# Patient Record
Sex: MALE | Race: WHITE | NOT HISPANIC OR LATINO | URBAN - METROPOLITAN AREA
[De-identification: names, ages, dates, MRNs, and addresses within clinical notes are randomized per-mention and may not be internally consistent; named-entity substitution may affect disease eponyms.]

---

## 2021-09-19 ENCOUNTER — OUT OF OFFICE VISIT (OUTPATIENT)
Dept: URBAN - METROPOLITAN AREA MEDICAL CENTER 18 | Facility: MEDICAL CENTER | Age: 51
End: 2021-09-19
Payer: COMMERCIAL

## 2021-09-19 DIAGNOSIS — D69.6 ACQUIRED THROMBOCYTOPENIA: ICD-10-CM

## 2021-09-19 DIAGNOSIS — K75.81 NASH (NONALCOHOLIC STEATOHEPATITIS): ICD-10-CM

## 2021-09-19 PROCEDURE — 99254 IP/OBS CNSLTJ NEW/EST MOD 60: CPT | Performed by: INTERNAL MEDICINE

## 2021-09-20 ENCOUNTER — OUT OF OFFICE VISIT (OUTPATIENT)
Dept: URBAN - METROPOLITAN AREA MEDICAL CENTER 18 | Facility: MEDICAL CENTER | Age: 51
End: 2021-09-20
Payer: COMMERCIAL

## 2021-09-20 DIAGNOSIS — K74.69 CIRRHOSIS, CRYPTOGENIC: ICD-10-CM

## 2021-09-20 PROCEDURE — 99232 SBSQ HOSP IP/OBS MODERATE 35: CPT | Performed by: INTERNAL MEDICINE

## 2021-10-18 ENCOUNTER — OFFICE VISIT (OUTPATIENT)
Dept: URBAN - METROPOLITAN AREA CLINIC 92 | Facility: CLINIC | Age: 51
End: 2021-10-18

## 2021-10-18 ENCOUNTER — OFFICE VISIT (OUTPATIENT)
Dept: URBAN - METROPOLITAN AREA CLINIC 86 | Facility: CLINIC | Age: 51
End: 2021-10-18
Payer: COMMERCIAL

## 2021-10-18 VITALS
BODY MASS INDEX: 29.12 KG/M2 | DIASTOLIC BLOOD PRESSURE: 86 MMHG | TEMPERATURE: 96.6 F | HEIGHT: 72 IN | WEIGHT: 215 LBS | HEART RATE: 97 BPM | SYSTOLIC BLOOD PRESSURE: 166 MMHG

## 2021-10-18 DIAGNOSIS — M86.9 OSTEOMYELITIS OF RIGHT FOOT, UNSPECIFIED TYPE: ICD-10-CM

## 2021-10-18 DIAGNOSIS — R76.0 RAISED ANTIBODY TITER: ICD-10-CM

## 2021-10-18 DIAGNOSIS — E11.69 TYPE 2 DIABETES MELLITUS WITH OTHER SPECIFIED COMPLICATION, WITHOUT LONG-TERM CURRENT USE OF INSULIN: ICD-10-CM

## 2021-10-18 DIAGNOSIS — Z79.899 HIGH RISK MEDICATION USE: ICD-10-CM

## 2021-10-18 DIAGNOSIS — Z71.89 VACCINE COUNSELING: ICD-10-CM

## 2021-10-18 DIAGNOSIS — N17.9 ACUTE KIDNEY INJURY: ICD-10-CM

## 2021-10-18 DIAGNOSIS — K74.69 OTHER CIRRHOSIS OF LIVER: ICD-10-CM

## 2021-10-18 DIAGNOSIS — B18.1 CARRIER OF HEPATITIS B: ICD-10-CM

## 2021-10-18 DIAGNOSIS — R18.8 ASCITES OF LIVER: ICD-10-CM

## 2021-10-18 DIAGNOSIS — E80.4 GILBERTS SYNDROME: ICD-10-CM

## 2021-10-18 DIAGNOSIS — K75.81 NASH (NONALCOHOLIC STEATOHEPATITIS): ICD-10-CM

## 2021-10-18 DIAGNOSIS — R74.8 ABNORMAL LIVER ENZYMES: ICD-10-CM

## 2021-10-18 PROCEDURE — 99204 OFFICE O/P NEW MOD 45 MIN: CPT

## 2021-10-18 RX ORDER — GLIMEPIRIDE 4 MG/1
1.5 TABLET WITH BREAKFAST OR THE FIRST MAIN MEAL OF THE DAY TABLET ORAL ONCE A DAY
Status: ACTIVE | COMMUNITY

## 2021-10-18 RX ORDER — TENOFOVIR ALAFENAMIDE 25 MG/1
1 TABLET WITH FOOD TABLET ORAL ONCE A DAY
Qty: 30 | Refills: 2 | OUTPATIENT
Start: 2021-10-18 | End: 2022-01-15

## 2021-10-18 RX ORDER — FUROSEMIDE 40 MG/1
1 TABLET TABLET ORAL ONCE A DAY
Status: ACTIVE | COMMUNITY

## 2021-10-18 RX ORDER — SITAGLIPTIN AND METFORMIN HYDROCHLORIDE 50; 1000 MG/1; MG/1
2 TABLETS WITH EVENING MEAL TABLET, FILM COATED, EXTENDED RELEASE ORAL ONCE A DAY
Status: ACTIVE | COMMUNITY

## 2021-10-18 RX ORDER — POTASSIUM CHLORIDE 1500 MG/1
1.5 TABLET WITH FOOD TABLET, FILM COATED, EXTENDED RELEASE ORAL ONCE A DAY
Status: ACTIVE | COMMUNITY

## 2021-10-18 NOTE — HPI-TODAY'S VISIT:
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 Gregory.  A copy of this note will be sent to the referring provider.   He has a PMHx of DM2 (last a1c 7.5% on glimepiride and janumet), hx of right great toe infection 8/2021, and presented to the ED on 9/17 with erythema/pain/drainage from right 4th toe. He was recently diagnosed with OM of right 4th digit on 9/10 and is s/p 4th digit partial metatarsal resection by Dr. Mathew. Patient did not follow up with ID outpatient for antibiotics following 9/10 surgery. Patient started on Vanc/Zosyn in ED on 9/17  He underwent further surgery by Dr. Mathew on 09/19/2021 including right partial 4th metatarsal resection with wound debridement and application of wound VAC, right excisional debridement of the hallux plantar ulcer down to subcutaneous tissues on 9/19.   While admitted  he was diagnosed with hepatitis B, ultrasound of the liver was concerning for possible liver cirrhosis and ascites and recommended to follow up outpatient with GI.   Also noted to have ANGELINA that was improvingDischarged on 9/25 with IV Zosyn x6wks at outpatient infusion center (Dr. Behari) with wound vac f/u at Dr Mathew's clinic (podiatry).    Pt notes hx of PARDO/fatty liver disease x 10-15 yrs and treated with diet and exercise and monitoring of labs q6m with pcp. Has not seen hepatology for it.  Notes no recent issue with abd pain, bleeding or confusion. Was noted to have jaundice during admission with T bili 4.0, direct bili 0.7, indirect bili 3.3. Denies heavy etoh use. No hx of iv drug use. Previously just had labs checked with his pcp every 6 months.  No prior egd. Colon 2-3 yrs ago with no polyps  He has had chronic thrombocytopenia since 2017 (platelets in 130's) and as low as 76 on 7/29/21.  Platelets 142 on admission (9/17) and 101 (9/24)  He continues with IV Zosyn until 10/31/21.  He has been on furosemide 40 mg/day prescribed by Dr. Preya Behari since July and prev prescribed by Dr. Castaneda PCP for LE edema.  Ascites first noted during sept hospitalization.  Currently has abdominal soreness- intially thought secondary to heparin injections.  No paracentesis performed while admitted, although US showed moderate ascites.   Abdominal distention/weight stable since discharge.  No fevers/chills.  No signs of GI bleeding/confusion. Does admit to decreased energy.  He believes he contracted HBV from his now ex-wife who notified him that she had been exposed to HBV after marital infidelity.  He  in July 2021.  Believes last exposure about 6 months ago.    Labs 9/25/2021: WBC 6.7, hemoglobin 9.0, MCV 96.7, platelets low 92, sodium 137, potassium 3.3 low, creatinine 1.54 high, calcium 7.9 low.  Labs 9/23/2021: HIV negative  Labs 9/22/2021: Hepatitis B viral  million, hepatitis B E antibody nonreactive  Labs 9/21/2021: Sodium 137 glucose 162, albumin low at 2.3, T bili 2.3 high, AST 69 high, ALT 31, alk phos 69, platelets 109 low, hemoglobin 9.7 low  Labs 9/20/2021: INR 1.39, PT 16.4, ASMA positive 1:40, hep B surface antigen positive, creatinine 1.65, alpha-1 antitrypsin 168, hepatitis A antibody IgM nonreactive, hep B core antibody IgM nonreactive, hep C antibody nonreactive, ceruloplasmin 32, AMA negative, AFP 1.1, vitamin D 11 low  Labs 9/19/2021: T bili 3.2, AST 63, ALT 32, alk phos 70, creatinine 1.75, total protein 6.0, albumin 2.3, sodium 136  Labs 9/18/2021: T bili 4.0, direct bili 0.7, indirect bili 3.3, A1c 7.5, COVID-19 negative   (9/18) GB US: FINDINGS: The pancreas is poorly visualized due to technical factors and overlying bowel gas. Liver and gallbladder are not well assessed due to poor acoustic windows. Increased liver echogenicity is suggested. Gallbladder is nonvisualized. There is a negative sonographic Cameron sign. The common bile duct measures 4 mm . The right kidney measures 12.7 cm in greatest longitudinal dimension. No right-sided hydronephrosis. There is a small to moderate amount of ascites demonstrated within the upper abdomen. Liver demonstrates a slightly nodular contour.   IMPRESSION: Summation limited by poor acoustic windows. The gallbladder was not visualized. Liver was not well assessed. Slightly nodular contour the liver suggests which may reflect underlying cirrhosis. Correlate with clinical findings. Diffuse mild increased liver echogenicity which can be seen with diffuse hepatic steatosis versus diffuse infiltration. Correlate with hepatic enzymes. Ascites.

## 2021-10-18 NOTE — EXAM-PHYSICAL EXAM
General: pleasant, well developed, well nourished, no acute distress, non ill appearing Eyes- no scleral icterus HENT: normocephalic, atraumatic head, face mask covering mouth and nose Neck: supple, no JVD Chest: normal breath sounds, normal work of breathing Heart: regular rate and rhythm without murmur Abdomen: tense abdomen with ascites, distended, non tender, bowel sounds present, no hepatomegaly, no splenomegaly,  Musculoskeletal: right foot with bandage intact, presented to clinic in wheelchair. Extremities: 2+ bilateral LE edema, no asterixis, + palmar erythema, PICC right arm with continuous abx infusion Skin: no rashes, no jaundice Neurologic: no focal deficits, alert and oriented x 3 Psychiatric: stable mood, appropriate affect

## 2021-10-26 LAB
A/G RATIO: 0.7
ACTIN (SMOOTH MUSCLE) ANTIBODY: 11
ALBUMIN: 3
ALKALINE PHOSPHATASE: 81
ALPHA-1-ANTITRYPSIN, SERUM: 232
ALT (SGPT): 20
ANA DIRECT: NEGATIVE
AST (SGOT): 45
BASO (ABSOLUTE): 0.1
BASOS: 1
BILIRUBIN, TOTAL: 3.3
BUN/CREATININE RATIO: 5
BUN: 6
CALCIUM: 7.8
CARBON DIOXIDE, TOTAL: 24
CHLORIDE: 97
CREATININE: 1.12
EGFR IF AFRICN AM: 89
EGFR IF NONAFRICN AM: 77
EOS (ABSOLUTE): 0.2
EOS: 3
FERRITIN, SERUM: 403
GLOBULIN, TOTAL: 4.1
GLUCOSE: 249
HBSAG SCREEN: POSITIVE
HBV LOG10: (no result)
HEMATOCRIT: 31.7
HEMATOLOGY COMMENTS:: (no result)
HEMOGLOBIN: 10.8
HEP A AB, TOTAL: NEGATIVE
HEP B CORE AB, TOT: POSITIVE
HEP BE AB: NEGATIVE
HEP BE AG: POSITIVE
HEPATITIS B QUANTITATION: (no result)
HEPATITIS B SURF AB QUANT: <3.1
IMMATURE CELLS: (no result)
IMMATURE GRANS (ABS): 0
IMMATURE GRANULOCYTES: 0
IMMUNOGLOBULIN G, QN, SERUM: 2396
IMMUNOGLOBULIN M, QN, SERUM: 141
INR: 1.2
IRON BIND.CAP.(TIBC): 212
IRON SATURATION: 33
IRON: 71
LYMPHS (ABSOLUTE): 1.2
LYMPHS: 18
MCH: 33.8
MCHC: 34.1
MCV: 99
MITOCHONDRIAL (M2) ANTIBODY: <20
MONOCYTES(ABSOLUTE): 0.6
MONOCYTES: 9
NEUTROPHILS (ABSOLUTE): 4.6
NEUTROPHILS: 69
NRBC: (no result)
PHENOTYPE (PI): (no result)
PLATELETS: 142
POTASSIUM: 3.1
PROTEIN, TOTAL: 7.1
PROTHROMBIN TIME: 12.7
RBC: 3.2
RDW: 14.5
SODIUM: 136
TEST INFORMATION:: (no result)
UIBC: 141
WBC: 6.6

## 2021-10-28 ENCOUNTER — TELEPHONE ENCOUNTER (OUTPATIENT)
Dept: URBAN - METROPOLITAN AREA CLINIC 92 | Facility: CLINIC | Age: 51
End: 2021-10-28

## 2021-10-28 NOTE — HPI-TODAY'S VISIT:
labs 10/18/21- TIBC slightly low at 212, iron sat normal at 33, igg 2396 elevated, igm 141, ferritin 403 elevated, hep be ag positive, hep be ab negative, asma negative (prev positive), ama negative, alpha 1 232 elevated but normal phenotype MM, glucose 249 elevated (please share with primary MD), creatinine 1.12 (prev 1.54), potassium low at 3.1 (please share with primary MD as likely secondary to lasix and they may want to adjust potassium supplment dosage or consider adding spironolactone to help with low potassium and fluid retention), albumin low at 3.0 but improved from prior of 2.3, total bili 3.3 elevated, alk phos 81 (prev 69), ast 45 elevated, alt 20, alyssa negative, wbc 6.6, hgb 10.8 low but up from prev 9.0, mcv high at 99, mch 33.8 elevated, rbc 3.2 (please share with primary md), plt 142 low (increased from 92 on 9/24/21), inr 1.2, hep b core ab positive, hep a ab total negative (recommend getting hep a vaccine as not immune), hep b surf ag positive.   I tried calling regarding lab results but it would not let me leave a voicemail.    Because kidney function has improved- we'd like to get MRI for further eval of liver.  An order will be sent to [place] and you can get that scheduled at [phone].  If you have any questions regarding above, please contact us at [phone] ext [phone].

## 2021-11-01 ENCOUNTER — LAB OUTSIDE AN ENCOUNTER (OUTPATIENT)
Dept: URBAN - METROPOLITAN AREA CLINIC 86 | Facility: CLINIC | Age: 51
End: 2021-11-01

## 2021-11-15 ENCOUNTER — TELEPHONE ENCOUNTER (OUTPATIENT)
Dept: URBAN - METROPOLITAN AREA CLINIC 92 | Facility: CLINIC | Age: 51
End: 2021-11-15

## 2021-11-15 ENCOUNTER — LAB OUTSIDE AN ENCOUNTER (OUTPATIENT)
Dept: URBAN - METROPOLITAN AREA CLINIC 86 | Facility: CLINIC | Age: 51
End: 2021-11-15

## 2021-11-15 ENCOUNTER — OFFICE VISIT (OUTPATIENT)
Dept: URBAN - METROPOLITAN AREA CLINIC 92 | Facility: CLINIC | Age: 51
End: 2021-11-15
Payer: COMMERCIAL

## 2021-11-15 DIAGNOSIS — K74.69 OTHER CIRRHOSIS OF LIVER: ICD-10-CM

## 2021-11-15 DIAGNOSIS — R18.8 ASCITES OF LIVER: ICD-10-CM

## 2021-11-15 DIAGNOSIS — B19.10 HEPATITIS B INFECTION WITHOUT DELTA AGENT WITHOUT HEPATIC COMA, UNSPECIFIED CHRONICITY: ICD-10-CM

## 2021-11-15 PROCEDURE — 99213 OFFICE O/P EST LOW 20 MIN: CPT | Performed by: INTERNAL MEDICINE

## 2021-11-15 RX ORDER — FUROSEMIDE 40 MG/1
1 TABLET TABLET ORAL ONCE A DAY
Status: ACTIVE | COMMUNITY

## 2021-11-15 RX ORDER — GLIMEPIRIDE 4 MG/1
1.5 TABLET WITH BREAKFAST OR THE FIRST MAIN MEAL OF THE DAY TABLET ORAL ONCE A DAY
Status: ACTIVE | COMMUNITY

## 2021-11-15 RX ORDER — SITAGLIPTIN AND METFORMIN HYDROCHLORIDE 50; 1000 MG/1; MG/1
2 TABLETS WITH EVENING MEAL TABLET, FILM COATED, EXTENDED RELEASE ORAL ONCE A DAY
Status: ACTIVE | COMMUNITY

## 2021-11-15 RX ORDER — POTASSIUM CHLORIDE 1500 MG/1
1.5 TABLET WITH FOOD TABLET, FILM COATED, EXTENDED RELEASE ORAL ONCE A DAY
Status: ACTIVE | COMMUNITY

## 2021-11-15 RX ORDER — TENOFOVIR ALAFENAMIDE 25 MG/1
1 TABLET WITH FOOD TABLET ORAL ONCE A DAY
Qty: 30 | Refills: 2 | Status: ACTIVE | COMMUNITY
Start: 2021-10-18 | End: 2022-01-15

## 2021-11-15 NOTE — PHYSICAL EXAM GASTROINTESTINAL
Abdomen , soft, nontender, severely distended , no guarding or rigidity , no masses palpable , normal bowel sounds , Liver and Spleen , no hepatomegaly present , no hepatosplenomegaly , liver nontender , spleen not palpable

## 2021-11-15 NOTE — HPI-TODAY'S VISIT:
Pt seen for HBV cirrhosis. Followed by Dr. Nolasco. Decompenasted with ascites. No previous EGD for variceal screening. No hx of GI Bleeding. Colonoscopy 2 yrs ago with polyp. Repeat recd in 5 yrs.

## 2021-11-15 NOTE — HPI-TODAY'S VISIT:
Received message from Dr. Estes recommending repeat para given tense ascites (seen by him today for EGD consultation).  Left message for patient advising we would send order to [place] today.  Encouraged to get updated labs and to call us if any questions/concerns.  Next f/u appt 11/29/21.

## 2021-11-29 ENCOUNTER — LAB OUTSIDE AN ENCOUNTER (OUTPATIENT)
Dept: URBAN - METROPOLITAN AREA CLINIC 13 | Facility: CLINIC | Age: 51
End: 2021-11-29

## 2021-11-29 ENCOUNTER — OFFICE VISIT (OUTPATIENT)
Dept: URBAN - METROPOLITAN AREA CLINIC 86 | Facility: CLINIC | Age: 51
End: 2021-11-29
Payer: COMMERCIAL

## 2021-11-29 VITALS
SYSTOLIC BLOOD PRESSURE: 142 MMHG | WEIGHT: 274 LBS | HEART RATE: 107 BPM | BODY MASS INDEX: 37.11 KG/M2 | TEMPERATURE: 98 F | HEIGHT: 72 IN | DIASTOLIC BLOOD PRESSURE: 82 MMHG

## 2021-11-29 DIAGNOSIS — R18.8 ASCITES OF LIVER: ICD-10-CM

## 2021-11-29 DIAGNOSIS — K74.69 OTHER CIRRHOSIS OF LIVER: ICD-10-CM

## 2021-11-29 DIAGNOSIS — B19.10 HEPATITIS B INFECTION WITHOUT DELTA AGENT WITHOUT HEPATIC COMA, UNSPECIFIED CHRONICITY: ICD-10-CM

## 2021-11-29 DIAGNOSIS — K75.81 NASH (NONALCOHOLIC STEATOHEPATITIS): ICD-10-CM

## 2021-11-29 PROCEDURE — 99214 OFFICE O/P EST MOD 30 MIN: CPT

## 2021-11-29 RX ORDER — SITAGLIPTIN AND METFORMIN HYDROCHLORIDE 50; 1000 MG/1; MG/1
2 TABLETS WITH EVENING MEAL TABLET, FILM COATED, EXTENDED RELEASE ORAL ONCE A DAY
Status: ACTIVE | COMMUNITY

## 2021-11-29 RX ORDER — TENOFOVIR ALAFENAMIDE 25 MG/1
1 TABLET WITH FOOD TABLET ORAL ONCE A DAY
Qty: 30 | Refills: 2 | OUTPATIENT

## 2021-11-29 RX ORDER — GLIMEPIRIDE 4 MG/1
1.5 TABLET WITH BREAKFAST OR THE FIRST MAIN MEAL OF THE DAY TABLET ORAL ONCE A DAY
Status: ACTIVE | COMMUNITY

## 2021-11-29 RX ORDER — POTASSIUM CHLORIDE 1500 MG/1
1.5 TABLET WITH FOOD TABLET, FILM COATED, EXTENDED RELEASE ORAL ONCE A DAY
Status: ACTIVE | COMMUNITY

## 2021-11-29 RX ORDER — FUROSEMIDE 40 MG/1
1 TABLET TABLET ORAL TWICE A DAY
Status: ACTIVE | COMMUNITY

## 2021-11-29 RX ORDER — TENOFOVIR ALAFENAMIDE 25 MG/1
1 TABLET WITH FOOD TABLET ORAL ONCE A DAY
Qty: 30 | Refills: 2 | Status: ACTIVE | COMMUNITY
Start: 2021-10-18 | End: 2022-01-15

## 2021-11-29 NOTE — EXAM-PHYSICAL EXAM
General: pleasant, well developed, well nourished, no acute distress, non ill appearing Eyes- no scleral icterus HENT: normocephalic, atraumatic head, face mask covering mouth and nose Neck: supple, no JVD Chest: normal breath sounds, normal work of breathing Heart: regular rate and rhythm without murmur Abdomen: tense abdomen with ascites, distended, non tender, bowel sounds present, no hepatomegaly, no splenomegaly,  Musculoskeletal: right foot with bandage intact and boot on.  Extremities: 2+ bilateral LE edema, no asterixis, + palmar erythema,  Skin: no rashes, no jaundice Neurologic: no focal deficits, alert and oriented x 3 Psychiatric: stable mood, appropriate affect

## 2021-11-29 NOTE — HPI-TODAY'S VISIT:
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 Gregory.  A copy of this note will be sent to the referring provider.  Pt returns for follow up of hep b cirrhosis with ascites.   S/p para with 5L ascites removed on 11/1/21, states more fluid remained but told they could only remove 5L max.  Currently scheduled for repeat para on 12/1/21 and MRI on 12/10, and EGD 12/6 with Dr. Estes.   Continues on Vemlidy, started about 2 weeks ago, no missed doses.  Weight 215 (estimate) on 10/18/21, 240 (estimate) on 11/15/21, and currently 274 today.   Admits to abdominal distention/weight increased.  No fevers/chills/abdominal pain. Had an episode of dark stools several weeks ago that lasted for 4 days, resolved but will f/u with Dr. Estes regarding this.  Colonoscopy about 2-3 years ago.  Was seen by pcp this AM, Dr. Castaneda.  He has been on furosemide 40 mg QD, and increased to furosemide 40 mg BID today, prescribed by Dr. Castaneda PCP for LE edema, on potassium supplement per pcp.   IV abx completed end of Oct 2021 for osteomyelitis, cleared by ID but will be on disability for 2 more months per podiatry.   Needs to work on low salt diet.  Last a1c 6.5% 11/29/21.     labs 10/18/21- TIBC slightly low at 212, iron sat normal at 33, igg 2396 elevated, igm 141, ferritin 403 elevated, hep be ag positive, hep be ab negative, asma negative (prev positive), ama negative, alpha 1 232 elevated but normal phenotype MM, glucose 249 elevated (please share with primary MD), creatinine 1.12 (prev 1.54), potassium low at 3.1 (please share with primary MD as likely secondary to lasix and they may want to adjust potassium supplment dosage or consider adding spironolactone to help with low potassium and fluid retention), albumin low at 3.0 but improved from prior of 2.3, total bili 3.3 elevated, alk phos 81 (prev 69), ast 45 elevated, alt 20, alyssa negative, wbc 6.6, hgb 10.8 low but up from prev 9.0, mcv high at 99, mch 33.8 elevated, rbc 3.2 (please share with primary md), plt 142 low (increased from 92 on 9/24/21), inr 1.2, hep b core ab positive, hep a ab total negative (recommend getting hep a vaccine as not immune), hep b surf ag positive.   RECAP: He has a PMHx of DM2 (last a1c 7.5% on glimepiride and janumet), hx of right great toe infection 8/2021, and presented to the ED on 9/17/21 with erythema/pain/drainage from right 4th toe. He was recently diagnosed with OM of right 4th digit on 9/10 and is s/p 4th digit partial metatarsal resection by Dr. Mathew. Patient did not follow up with ID outpatient for antibiotics following 9/10 surgery. Patient started on Vanc/Zosyn in ED on 9/17  He underwent further surgery by Dr. Mathew on 09/19/2021 including right partial 4th metatarsal resection with wound debridement and application of wound VAC, right excisional debridement of the hallux plantar ulcer down to subcutaneous tissues on 9/19.   While admitted  he was diagnosed with hepatitis B, ultrasound of the liver was concerning for possible liver cirrhosis and ascites and recommended to follow up outpatient with GI.   Also noted to have ANGELINA that was improving.  Discharged on 9/25 with IV Zosyn x6wks at outpatient infusion center (Dr. Behari) with wound vac f/u at Dr Mathew's clinic (podiatry).    Pt notes hx of PARDO/fatty liver disease x 10-15 yrs and treated with diet and exercise and monitoring of labs q6m with pcp. Has not seen hepatology for it.  Was noted to have jaundice during admission with T bili 4.0, direct bili 0.7, indirect bili 3.3. Denies heavy etoh use. No hx of iv drug use. Previously just had labs checked with his pcp every 6 months.  No prior egd. Colon 2-3 yrs ago with no polyps  He has had chronic thrombocytopenia since 2017 (platelets in 130's) and as low as 76 on 7/29/21.  Platelets 142 on admission (9/17) and 101 (9/24)  Ascites first noted during sept hospitalization.  Currently has abdominal soreness- intially thought secondary to heparin injections.  No paracentesis performed while admitted, although US showed moderate ascites.     He believes he contracted HBV from his now ex-wife who notified him that she had been exposed to HBV after marital infidelity.  He  in July 2021.  Believes last exposure about April 2021.  Labs 9/25/2021: WBC 6.7, hemoglobin 9.0, MCV 96.7, platelets low 92, sodium 137, potassium 3.3 low, creatinine 1.54 high, calcium 7.9 low.  Labs 9/23/2021: HIV negative  Labs 9/22/2021: Hepatitis B viral  million, hepatitis B E antibody nonreactive  Labs 9/21/2021: Sodium 137 glucose 162, albumin low at 2.3, T bili 2.3 high, AST 69 high, ALT 31, alk phos 69, platelets 109 low, hemoglobin 9.7 low  Labs 9/20/2021: INR 1.39, PT 16.4, ASMA positive 1:40, hep B surface antigen positive, creatinine 1.65, alpha-1 antitrypsin 168, hepatitis A antibody IgM nonreactive, hep B core antibody IgM nonreactive, hep C antibody nonreactive, ceruloplasmin 32, AMA negative, AFP 1.1, vitamin D 11 low  Labs 9/19/2021: T bili 3.2, AST 63, ALT 32, alk phos 70, creatinine 1.75, total protein 6.0, albumin 2.3, sodium 136  Labs 9/18/2021: T bili 4.0, direct bili 0.7, indirect bili 3.3, A1c 7.5, COVID-19 negative   (9/18) GB US: FINDINGS: The pancreas is poorly visualized due to technical factors and overlying bowel gas. Liver and gallbladder are not well assessed due to poor acoustic windows. Increased liver echogenicity is suggested. Gallbladder is nonvisualized. There is a negative sonographic Cameron sign. The common bile duct measures 4 mm . The right kidney measures 12.7 cm in greatest longitudinal dimension. No right-sided hydronephrosis. There is a small to moderate amount of ascites demonstrated within the upper abdomen. Liver demonstrates a slightly nodular contour.   IMPRESSION: Summation limited by poor acoustic windows. The gallbladder was not visualized. Liver was not well assessed. Slightly nodular contour the liver suggests which may reflect underlying cirrhosis. Correlate with clinical findings. Diffuse mild increased liver echogenicity which can be seen with diffuse hepatic steatosis versus diffuse infiltration. Correlate with hepatic enzymes. Ascites.

## 2021-12-05 LAB
A/G RATIO: 0.5
ALBUMIN: 2.3
ALKALINE PHOSPHATASE: 148
ALT (SGPT): 26
AST (SGOT): 57
BASO (ABSOLUTE): 0.1
BASOS: 1
BILIRUBIN, TOTAL: 2.7
BUN/CREATININE RATIO: 7
BUN: 7
CALCIUM: 7.9
CARBON DIOXIDE, TOTAL: 26
CHLORIDE: 93
CREATININE: 0.94
EGFR IF AFRICN AM: 110
EGFR IF NONAFRICN AM: 95
EOS (ABSOLUTE): 0.2
EOS: 2
FOLATE (FOLIC ACID), SERUM: 3.7
GLOBULIN, TOTAL: 4.7
GLUCOSE: 290
HBV LOG10: (no result)
HEMATOCRIT: 29.2
HEMATOLOGY COMMENTS:: (no result)
HEMOGLOBIN: 9.9
HEPATITIS B QUANTITATION: (no result)
IMMATURE CELLS: (no result)
IMMATURE GRANS (ABS): 0
IMMATURE GRANULOCYTES: 0
LYMPHS (ABSOLUTE): 1.4
LYMPHS: 19
Lab: (no result)
Lab: (no result)
MCH: 33
MCHC: 33.9
MCV: 97
MONOCYTES(ABSOLUTE): 1.1
MONOCYTES: 15
NEUTROPHILS (ABSOLUTE): 4.6
NEUTROPHILS: 63
NRBC: (no result)
PLATELETS: 146
POTASSIUM: 4
PROTEIN, TOTAL: 7
RBC: 3
RDW: 13.1
SODIUM: 131
TEST INFORMATION:: (no result)
VITAMIN B12: 1099
WBC: 7.4
WRITTEN AUTHORIZATION: (no result)

## 2021-12-06 ENCOUNTER — OFFICE VISIT (OUTPATIENT)
Dept: URBAN - METROPOLITAN AREA MEDICAL CENTER 12 | Facility: MEDICAL CENTER | Age: 51
End: 2021-12-06
Payer: COMMERCIAL

## 2021-12-06 PROCEDURE — 992 APS NON BILLABLE: Performed by: INTERNAL MEDICINE

## 2021-12-09 ENCOUNTER — TELEPHONE ENCOUNTER (OUTPATIENT)
Dept: URBAN - METROPOLITAN AREA CLINIC 92 | Facility: CLINIC | Age: 51
End: 2021-12-09

## 2021-12-09 NOTE — HPI-TODAY'S VISIT:
11/29/21 labs- vitamin b12 1099 normal , folate 3.7 normal, glucose 290 (significant elevated, please share with primary MD), creatinine 0.94 normal, monocytes absolute 1.1 high, sodium 131 low, chloride 93 low, calcium 7.9 low, albumin 2.3 (prev 2.8) low, total bili 2.7 high (prev 3.5 on 11/5/21), alk phos 148 elevated (prev 100 11/5/21), ast 57 elevated (prev 61 11/5/21), alt 26 normal (prev 23 11/5/21), hgb 9.9 low (prev 12.2 11/5/21), rbc 3.0 low, platelets 146 low (prev 163).  Please share these labs with primary MD.  Would like to repeat labs again in 2 weeks prior to next office visit on 12/28.  HBV DNA viral load down to 319 million (from prev 890 million prior to starting vemlidy)  12/1 paracentesis- 13.5 L ascites removed.

## 2021-12-13 ENCOUNTER — TELEPHONE ENCOUNTER (OUTPATIENT)
Dept: URBAN - METROPOLITAN AREA CLINIC 92 | Facility: CLINIC | Age: 51
End: 2021-12-13

## 2021-12-13 NOTE — HPI-TODAY'S VISIT:
Dear Garo Suarez, The December 10 MRI unfortunately was limited somewhat by your ascites problem. They mention that you had a large layering left pleural effusion with bibasilar atelectasis.  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 They mention he had a shrunken cirrhotic appearing liver there was again limited in its exam but with no obvious lesion but again was limited. The gallbladder and biliary tree showed no discrete abnormality. Spleen was enlarged at 16.5 cm. Pancreas head no gross abnormality.  Neither did the kidneys. Lymph nodes were suboptimally seen but with no gross lymphadenopathy. The portal vein appeared to be grossly patent.  You had a large volume of ascites and some anasarca in your tissues. They recommend given your fluid issues that for the next MRI to do the MRI not too long after after you have a paracentesis done.  That will/could diminish some of the artifact issues that we had this time.   Unfortunately,  not always easy to coordinate these scans as an outpatient with a tap given the limitations of how things are scheduled independently. The hope is that with your hepatitis B being on treatment that the liver labs will cool off as well as some of the factors leading to the speed of this fluid accumulation.  Unfortunately the HBV medicine do take a period of time to drop the HBV levels and inflammation from this particularly if the virus level is higher to drop to start with. Dr Nolasco

## 2021-12-14 ENCOUNTER — TELEPHONE ENCOUNTER (OUTPATIENT)
Dept: URBAN - METROPOLITAN AREA CLINIC 92 | Facility: CLINIC | Age: 51
End: 2021-12-14

## 2021-12-15 ENCOUNTER — TELEPHONE ENCOUNTER (OUTPATIENT)
Dept: URBAN - METROPOLITAN AREA CLINIC 92 | Facility: CLINIC | Age: 51
End: 2021-12-15

## 2021-12-20 ENCOUNTER — TELEPHONE ENCOUNTER (OUTPATIENT)
Dept: URBAN - METROPOLITAN AREA CLINIC 92 | Facility: CLINIC | Age: 51
End: 2021-12-20

## 2021-12-23 ENCOUNTER — TELEPHONE ENCOUNTER (OUTPATIENT)
Dept: URBAN - METROPOLITAN AREA CLINIC 86 | Facility: CLINIC | Age: 51
End: 2021-12-23

## 2021-12-27 ENCOUNTER — LAB OUTSIDE AN ENCOUNTER (OUTPATIENT)
Dept: URBAN - METROPOLITAN AREA CLINIC 86 | Facility: CLINIC | Age: 51
End: 2021-12-27

## 2021-12-28 ENCOUNTER — OFFICE VISIT (OUTPATIENT)
Dept: URBAN - METROPOLITAN AREA CLINIC 86 | Facility: CLINIC | Age: 51
End: 2021-12-28
Payer: COMMERCIAL

## 2021-12-28 VITALS
SYSTOLIC BLOOD PRESSURE: 155 MMHG | HEART RATE: 102 BPM | WEIGHT: 284 LBS | DIASTOLIC BLOOD PRESSURE: 84 MMHG | BODY MASS INDEX: 38.47 KG/M2 | HEIGHT: 72 IN

## 2021-12-28 DIAGNOSIS — B18.1 CARRIER OF HEPATITIS B: ICD-10-CM

## 2021-12-28 DIAGNOSIS — Z71.89 VACCINE COUNSELING: ICD-10-CM

## 2021-12-28 DIAGNOSIS — R74.8 ABNORMAL LIVER ENZYMES: ICD-10-CM

## 2021-12-28 DIAGNOSIS — Z79.899 HIGH RISK MEDICATION USE: ICD-10-CM

## 2021-12-28 DIAGNOSIS — E80.4 GILBERTS SYNDROME: ICD-10-CM

## 2021-12-28 DIAGNOSIS — R76.0 RAISED ANTIBODY TITER: ICD-10-CM

## 2021-12-28 DIAGNOSIS — E11.69 TYPE 2 DIABETES MELLITUS WITH OTHER SPECIFIED COMPLICATION, WITHOUT LONG-TERM CURRENT USE OF INSULIN: ICD-10-CM

## 2021-12-28 DIAGNOSIS — K75.81 NASH (NONALCOHOLIC STEATOHEPATITIS): ICD-10-CM

## 2021-12-28 DIAGNOSIS — K74.69 OTHER CIRRHOSIS OF LIVER: ICD-10-CM

## 2021-12-28 DIAGNOSIS — N17.9 ACUTE KIDNEY INJURY: ICD-10-CM

## 2021-12-28 DIAGNOSIS — R18.8 ASCITES OF LIVER: ICD-10-CM

## 2021-12-28 PROCEDURE — 99214 OFFICE O/P EST MOD 30 MIN: CPT

## 2021-12-28 RX ORDER — TENOFOVIR ALAFENAMIDE 25 MG/1
1 TABLET WITH FOOD TABLET ORAL ONCE A DAY
Qty: 30 | Refills: 2 | Status: ACTIVE | COMMUNITY

## 2021-12-28 RX ORDER — POTASSIUM CHLORIDE 1500 MG/1
1.5 TABLET WITH FOOD TABLET, FILM COATED, EXTENDED RELEASE ORAL ONCE A DAY
Status: ACTIVE | COMMUNITY

## 2021-12-28 RX ORDER — GLIMEPIRIDE 4 MG/1
1.5 TABLET WITH BREAKFAST OR THE FIRST MAIN MEAL OF THE DAY TABLET ORAL ONCE A DAY
Status: ACTIVE | COMMUNITY

## 2021-12-28 RX ORDER — FUROSEMIDE 40 MG/1
1 TABLET TABLET ORAL TWICE A DAY
Status: ACTIVE | COMMUNITY

## 2021-12-28 RX ORDER — SITAGLIPTIN AND METFORMIN HYDROCHLORIDE 50; 1000 MG/1; MG/1
2 TABLETS WITH EVENING MEAL TABLET, FILM COATED, EXTENDED RELEASE ORAL ONCE A DAY
Status: ACTIVE | COMMUNITY

## 2021-12-28 NOTE — HPI-TODAY'S VISIT:
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 Gregory.  A copy of this note will be sent to the referring provider.  Pt returns today with complaint of increased abdominal distention, sob, and increased weight.  Currently weighs 284 lbs.  Prior weight 215 (estimate) on 10/18/21, 240 (estimate) on 11/15/21, and  274 11/29/21.  MRI 12/10/21 showed large left pleural effusion, has not heard back from pcp in regards to further mgmt of this.  Admits to orthopnea requiring 3-4 pillows.  O2 sat 90% in office.  Presents today in wheelchair due to difficulty with walking secondary to excess weight, swelling in legs, abdomen and SOB.  EGD rescheduled for feb 2022 with Dr. Estes for variceal screening- issue with getting covid test prior to prev scheduled 12/6 egd.  S/p paracentesis with 5L ascites removed on 11/1/21, states more fluid remained but told they could only remove 5L max.  Underwent repeat paracentesis on 12/1/21 with 13.5 L ascites removed.  Continues on Vemlidy, started mid November 2021 with no missed doses.  No fevers/chills. He has been on furosemide 40 mg QD, and increased to furosemide 40 mg BID 11/29/21 prescribed by Dr. Castaneda PCP for LE edema, on potassium supplement per pcp.   Has not been on aldactone and pcp prefers us to prescribe.  IV abx completed end of Oct 2021 for osteomyelitis, cleared by ID but will be on disability for 2 more months per podiatry.   States he is adhering to low salt diet.  Recently seen by [facility] for anemia.   Last a1c 6.5% 11/29/21.   Last labs 11/29/21, did not get 2 week follow labs as requested.     12/10/21 MRI unfortunately was limited somewhat by your ascites problem. They mention that you had a large layering left pleural effusion with bibasilar atelectasis.  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 They mention he had a shrunken cirrhotic appearing liver there was again limited in its exam but with no obvious lesion but again was limited. The gallbladder and biliary tree showed no discrete abnormality. Spleen was enlarged at 16.5 cm. Pancreas head no gross abnormality.  Neither did the kidneys. Lymph nodes were suboptimally seen but with no gross lymphadenopathy. The portal vein appeared to be grossly patent.  You had a large volume of ascites and some anasarca in your tissues. They recommend given your fluid issues that for the next MRI to do the MRI not too long after after you have a paracentesis done.  That will/could diminish some of the artifact issues that we had this time.   Unfortunately,  not always easy to coordinate these scans as an outpatient with a tap given the limitations of how things are scheduled independently. The hope is that with your hepatitis B being on treatment that the liver labs will cool off as well as some of the factors leading to the speed of this fluid accumulation.  Unfortunately the HBV medicine do take a period of time to drop the HBV levels and inflammation from this particularly if the virus level is higher to drop to start with.  11/29/21 labs- vitamin b12 1099 normal , folate 3.7 normal, glucose 290 (significant elevated, please share with primary MD), creatinine 0.94 normal, monocytes absolute 1.1 high, sodium 131 low, chloride 93 low, calcium 7.9 low, albumin 2.3 (prev 2.8) low, total bili 2.7 high (prev 3.5 on 11/5/21), alk phos 148 elevated (prev 100 11/5/21), ast 57 elevated (prev 61 11/5/21), alt 26 normal (prev 23 11/5/21), hgb 9.9 low (prev 12.2 11/5/21), rbc 3.0 low, platelets 146 low (prev 163).  Please share these labs with primary MD.  Would like to repeat labs again in 2 weeks prior to next office visit on 12/28.  HBV DNA viral load down to 319 million (from prev 890 million prior to starting vemlidy)  labs 10/18/21- TIBC slightly low at 212, iron sat normal at 33, igg 2396 elevated, igm 141, ferritin 403 elevated, hep be ag positive, hep be ab negative, asma negative (prev positive), ama negative, alpha 1 232 elevated but normal phenotype MM, glucose 249 elevated (please share with primary MD), creatinine 1.12 (prev 1.54), potassium low at 3.1 (please share with primary MD as likely secondary to lasix and they may want to adjust potassium supplment dosage or consider adding spironolactone to help with low potassium and fluid retention), albumin low at 3.0 but improved from prior of 2.3, total bili 3.3 elevated, alk phos 81 (prev 69), ast 45 elevated, alt 20, alyssa negative, wbc 6.6, hgb 10.8 low but up from prev 9.0, mcv high at 99, mch 33.8 elevated, rbc 3.2 (please share with primary md), plt 142 low (increased from 92 on 9/24/21), inr 1.2, hep b core ab positive, hep a ab total negative (recommend getting hep a vaccine as not immune), hep b surf ag positive.   RECAP: He has a PMHx of DM2 (last a1c 7.5% on glimepiride and janumet), hx of right great toe infection 8/2021, and presented to the ED on 9/17/21 with erythema/pain/drainage from right 4th toe. He was recently diagnosed with OM of right 4th digit on 9/10/21 and is s/p 4th digit partial metatarsal resection by Dr. Mathew. Patient did not follow up with ID outpatient for antibiotics following 9/10 surgery. Patient started on Vanc/Zosyn in ED on 9/17  He underwent further surgery by Dr. Mathew on 09/19/2021 including right partial 4th metatarsal resection with wound debridement and application of wound VAC, right excisional debridement of the hallux plantar ulcer down to subcutaneous tissues on 9/19.   While admitted  he was diagnosed with hepatitis B, ultrasound of the liver was concerning for possible liver cirrhosis and ascites and recommended to follow up outpatient with GI.   Also noted to have ANGELINA that was improving.  Discharged on 9/25 with IV Zosyn x6wks at outpatient infusion center (Dr. Behari) with wound vac f/u at Dr Mathew's clinic (podiatry).    Pt notes hx of PARDO/fatty liver disease x 10-15 yrs and treated with diet and exercise and monitoring of labs q6m with pcp. Has not seen hepatology for it.  Was noted to have jaundice during admission with T bili 4.0, direct bili 0.7, indirect bili 3.3. Denies heavy etoh use. No hx of iv drug use. Previously just had labs checked with his pcp every 6 months.  No prior egd. Colon 2-3 yrs ago with no polyps  He has had chronic thrombocytopenia since 2017 (platelets in 130's) and as low as 76 on 7/29/21.  Platelets 142 on admission (9/17) and 101 (9/24)  Ascites first noted during sept hospitalization.    He believes he contracted HBV from his now ex-wife who notified him that she had been exposed to HBV after marital infidelity.  He  in July 2021.  Believes last exposure about April 2021.  Labs 9/25/2021: WBC 6.7, hemoglobin 9.0, MCV 96.7, platelets low 92, sodium 137, potassium 3.3 low, creatinine 1.54 high, calcium 7.9 low.  Labs 9/23/2021: HIV negative  Labs 9/22/2021: Hepatitis B viral  million, hepatitis B E antibody nonreactive  Labs 9/21/2021: Sodium 137 glucose 162, albumin low at 2.3, T bili 2.3 high, AST 69 high, ALT 31, alk phos 69, platelets 109 low, hemoglobin 9.7 low  Labs 9/20/2021: INR 1.39, PT 16.4, ASMA positive 1:40, hep B surface antigen positive, creatinine 1.65, alpha-1 antitrypsin 168, hepatitis A antibody IgM nonreactive, hep B core antibody IgM nonreactive, hep C antibody nonreactive, ceruloplasmin 32, AMA negative, AFP 1.1, vitamin D 11 low  Labs 9/19/2021: T bili 3.2, AST 63, ALT 32, alk phos 70, creatinine 1.75, total protein 6.0, albumin 2.3, sodium 136  Labs 9/18/2021: T bili 4.0, direct bili 0.7, indirect bili 3.3, A1c 7.5, COVID-19 negative   (9/18) GB US: FINDINGS: The pancreas is poorly visualized due to technical factors and overlying bowel gas. Liver and gallbladder are not well assessed due to poor acoustic windows. Increased liver echogenicity is suggested. Gallbladder is nonvisualized. There is a negative sonographic Cameron sign. The common bile duct measures 4 mm . The right kidney measures 12.7 cm in greatest longitudinal dimension. No right-sided hydronephrosis. There is a small to moderate amount of ascites demonstrated within the upper abdomen. Liver demonstrates a slightly nodular contour.   IMPRESSION: Summation limited by poor acoustic windows. The gallbladder was not visualized. Liver was not well assessed. Slightly nodular contour the liver suggests which may reflect underlying cirrhosis. Correlate with clinical findings. Diffuse mild increased liver echogenicity which can be seen with diffuse hepatic steatosis versus diffuse infiltration. Correlate with hepatic enzymes. Ascites.

## 2021-12-28 NOTE — EXAM-PHYSICAL EXAM
General: pleasant, well developed, well nourished, no acute distress,  ill appearing, older than stated age Eyes- no scleral icterus HENT: normocephalic, atraumatic head, face mask covering mouth and nose Neck: supple, no JVD Chest: decreased breath sounds left middle/lower lung fields, increased work of breathing Heart: regular rate and rhythm without murmur Abdomen: tense, distended, bowel sounds present, + fluid wave Musculoskeletal: not assessed, presented to clinic in wheelchair Extremities: 2+ edema, no asterixis, + palmar erythema Skin: no rashes, no jaundice Neurologic: no focal deficits, alert and oriented x 3 Psychiatric: stable mood, appropriate affect

## 2021-12-29 ENCOUNTER — OUT OF OFFICE VISIT (OUTPATIENT)
Dept: URBAN - METROPOLITAN AREA MEDICAL CENTER 12 | Facility: MEDICAL CENTER | Age: 51
End: 2021-12-29
Payer: COMMERCIAL

## 2021-12-29 DIAGNOSIS — R18.8 ABDOMINAL ASCITES: ICD-10-CM

## 2021-12-29 DIAGNOSIS — K74.69 CIRRHOSIS, CRYPTOGENIC: ICD-10-CM

## 2021-12-29 DIAGNOSIS — B19.10 HEPATITIS B: ICD-10-CM

## 2021-12-29 PROCEDURE — 99222 1ST HOSP IP/OBS MODERATE 55: CPT | Performed by: INTERNAL MEDICINE

## 2021-12-29 PROCEDURE — G8427 DOCREV CUR MEDS BY ELIG CLIN: HCPCS | Performed by: INTERNAL MEDICINE

## 2021-12-29 PROCEDURE — 99232 SBSQ HOSP IP/OBS MODERATE 35: CPT | Performed by: INTERNAL MEDICINE

## 2021-12-31 ENCOUNTER — OUT OF OFFICE VISIT (OUTPATIENT)
Dept: URBAN - METROPOLITAN AREA MEDICAL CENTER 12 | Facility: MEDICAL CENTER | Age: 51
End: 2021-12-31
Payer: COMMERCIAL

## 2021-12-31 DIAGNOSIS — R60.1 ANASARCA: ICD-10-CM

## 2021-12-31 DIAGNOSIS — K76.6 CLINICALLY SIGNIFICANT PORTAL HYPERTENSION: ICD-10-CM

## 2021-12-31 DIAGNOSIS — K74.69 CIRRHOSIS, CRYPTOGENIC: ICD-10-CM

## 2021-12-31 DIAGNOSIS — B18.1 CHRONIC HEPATITIS B: ICD-10-CM

## 2021-12-31 DIAGNOSIS — R18.8 ABDOMINAL ASCITES: ICD-10-CM

## 2021-12-31 PROCEDURE — 99232 SBSQ HOSP IP/OBS MODERATE 35: CPT | Performed by: INTERNAL MEDICINE

## 2022-01-03 ENCOUNTER — CLAIMS CREATED FROM THE CLAIM WINDOW (OUTPATIENT)
Dept: URBAN - METROPOLITAN AREA MEDICAL CENTER 12 | Facility: MEDICAL CENTER | Age: 52
End: 2022-01-03

## 2022-01-03 ENCOUNTER — CLAIMS CREATED FROM THE CLAIM WINDOW (OUTPATIENT)
Dept: URBAN - METROPOLITAN AREA MEDICAL CENTER 12 | Facility: MEDICAL CENTER | Age: 52
End: 2022-01-03
Payer: COMMERCIAL

## 2022-01-03 DIAGNOSIS — R79.89 ABNORMAL BILIRUBIN TEST: ICD-10-CM

## 2022-01-03 DIAGNOSIS — K74.69 CIRRHOSIS, CRYPTOGENIC: ICD-10-CM

## 2022-01-03 DIAGNOSIS — D64.89 ANEMIA DUE TO OTHER CAUSE: ICD-10-CM

## 2022-01-03 DIAGNOSIS — B18.1 CHRONIC HEPATITIS B: ICD-10-CM

## 2022-01-03 DIAGNOSIS — R18.8 ABDOMINAL ASCITES: ICD-10-CM

## 2022-01-03 PROCEDURE — 99233 SBSQ HOSP IP/OBS HIGH 50: CPT

## 2022-01-06 ENCOUNTER — CLAIMS CREATED FROM THE CLAIM WINDOW (OUTPATIENT)
Dept: URBAN - METROPOLITAN AREA MEDICAL CENTER 12 | Facility: MEDICAL CENTER | Age: 52
End: 2022-01-06
Payer: COMMERCIAL

## 2022-01-06 ENCOUNTER — OUT OF OFFICE VISIT (OUTPATIENT)
Dept: URBAN - METROPOLITAN AREA MEDICAL CENTER 12 | Facility: MEDICAL CENTER | Age: 52
End: 2022-01-06

## 2022-01-06 DIAGNOSIS — K74.69 CIRRHOSIS, CRYPTOGENIC: ICD-10-CM

## 2022-01-06 DIAGNOSIS — R18.8 ABDOMINAL ASCITES: ICD-10-CM

## 2022-01-06 DIAGNOSIS — D64.89 OTHER SPECIFIED ANEMIAS: ICD-10-CM

## 2022-01-06 DIAGNOSIS — B18.1 CHRONIC HEPATITIS B: ICD-10-CM

## 2022-01-06 PROCEDURE — 99233 SBSQ HOSP IP/OBS HIGH 50: CPT

## 2022-01-07 ENCOUNTER — OUT OF OFFICE VISIT (OUTPATIENT)
Dept: URBAN - METROPOLITAN AREA MEDICAL CENTER 12 | Facility: MEDICAL CENTER | Age: 52
End: 2022-01-07
Payer: COMMERCIAL

## 2022-01-07 DIAGNOSIS — K31.89 ACQUIRED DEFORMITY OF DUODENUM: ICD-10-CM

## 2022-01-07 DIAGNOSIS — I85.10 ESOPH VARICE OTHER DIS: ICD-10-CM

## 2022-01-07 DIAGNOSIS — K76.6 CLINICALLY SIGNIFICANT PORTAL HYPERTENSION: ICD-10-CM

## 2022-01-07 DIAGNOSIS — E87.70 FLUID OVERLOAD: ICD-10-CM

## 2022-01-07 PROCEDURE — 99232 SBSQ HOSP IP/OBS MODERATE 35: CPT | Performed by: INTERNAL MEDICINE

## 2022-01-07 PROCEDURE — 43244 EGD VARICES LIGATION: CPT | Performed by: INTERNAL MEDICINE

## 2022-01-14 ENCOUNTER — LAB OUTSIDE AN ENCOUNTER (OUTPATIENT)
Dept: URBAN - METROPOLITAN AREA TELEHEALTH 2 | Facility: TELEHEALTH | Age: 52
End: 2022-01-14

## 2022-01-14 ENCOUNTER — OFFICE VISIT (OUTPATIENT)
Dept: URBAN - METROPOLITAN AREA TELEHEALTH 2 | Facility: TELEHEALTH | Age: 52
End: 2022-01-14
Payer: COMMERCIAL

## 2022-01-14 DIAGNOSIS — K75.81 NASH (NONALCOHOLIC STEATOHEPATITIS): ICD-10-CM

## 2022-01-14 DIAGNOSIS — K74.69 OTHER CIRRHOSIS OF LIVER: ICD-10-CM

## 2022-01-14 DIAGNOSIS — B19.10 HEPATITIS B INFECTION WITHOUT DELTA AGENT WITHOUT HEPATIC COMA, UNSPECIFIED CHRONICITY: ICD-10-CM

## 2022-01-14 DIAGNOSIS — R18.8 ASCITES OF LIVER: ICD-10-CM

## 2022-01-14 PROBLEM — 79231000 HYDROTHORAX: Status: ACTIVE | Noted: 2022-01-14

## 2022-01-14 PROBLEM — 111552007 DIABETES MELLITUS WITHOUT COMPLICATION: Status: ACTIVE | Noted: 2022-01-14

## 2022-01-14 PROBLEM — 816160009 WEIGHT LOSS: Status: ACTIVE | Noted: 2022-01-14

## 2022-01-14 PROBLEM — 60168000 OSTEOMYELITIS: Status: ACTIVE | Noted: 2022-01-14

## 2022-01-14 PROBLEM — 441988000 IMAGING OF ABDOMEN ABNORMAL: Status: ACTIVE | Noted: 2022-01-14

## 2022-01-14 PROCEDURE — 99215 OFFICE O/P EST HI 40 MIN: CPT

## 2022-01-14 RX ORDER — FUROSEMIDE 40 MG/1
1 TABLET TABLET ORAL TWICE A DAY
Status: ACTIVE | COMMUNITY

## 2022-01-14 RX ORDER — METFORMIN HYDROCHLORIDE 500 MG/1
1 TABLET WITH A MEAL TABLET, FILM COATED ORAL BID
Status: ACTIVE | COMMUNITY

## 2022-01-14 RX ORDER — POTASSIUM CHLORIDE 1500 MG/1
1 TABLET WITH FOOD TABLET, FILM COATED, EXTENDED RELEASE ORAL TWICE A DAY
Status: ACTIVE | COMMUNITY

## 2022-01-14 RX ORDER — FOLIC ACID 1 MG/1
1 TABLET TABLET ORAL ONCE A DAY
Status: ACTIVE | COMMUNITY

## 2022-01-14 RX ORDER — SITAGLIPTIN AND METFORMIN HYDROCHLORIDE 50; 1000 MG/1; MG/1
2 TABLETS WITH EVENING MEAL TABLET, FILM COATED, EXTENDED RELEASE ORAL ONCE A DAY
Status: ACTIVE | COMMUNITY

## 2022-01-14 RX ORDER — TENOFOVIR ALAFENAMIDE 25 MG/1
1 TABLET WITH FOOD TABLET ORAL ONCE A DAY
Qty: 30 | Refills: 2 | Status: ACTIVE | COMMUNITY

## 2022-01-14 RX ORDER — GLIMEPIRIDE 4 MG/1
1 TABLET WITH BREAKFAST OR THE FIRST MAIN MEAL OF THE DAY TABLET ORAL ONCE A DAY
Status: DISCONTINUED | COMMUNITY

## 2022-01-14 RX ORDER — SPIRONOLACTONE 50 MG/1
1 TABLET TABLET, FILM COATED ORAL ONCE A DAY
Status: ACTIVE | COMMUNITY

## 2022-01-14 RX ORDER — HUMAN INSULIN 100 [IU]/ML
4 UNITS INJECTION, SUSPENSION SUBCUTANEOUS
Status: ACTIVE | COMMUNITY

## 2022-01-14 NOTE — HPI-TODAY'S VISIT:
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   A copy of this note will be sent to the referring provider.  Prior Scarlett as outpt at center and also admitted at [facility] 09/2021 by Dr. Gregory when toe amputation done. Sept 9 first and then did 2nd.  Pt wanting to be followed here so sees Dr Velarde for gi scopes here.  HBV noted and then led to hbv tx and we started the Vemlidy in oct 2021.  Pt admtited from clinic Dec 28 and found fluid less in the chest and more so in abdomen and had 3 abd taps (prior had 2 taps pre admit). Varied 5 to 15 liters.  IV alb and diuresis and lost 284 to 191.   On lasix 40mg po bid and aldactone 50mg po qd.  At home weight coming up a little.  He will cut back to 40mg a day of lasix and aldactone pending the labs.  He stayed on vemlidy.  HBV level was checked in the hospital.  [id number] eag pos and eab neg. That is coming down form a much higher level.  redid the mri with taps and they saw better but not as well.   Document Type:	MRI Abdomen w/ + w/o Contrast Document Date:	January 03, 2022 18:00 EST Document Status:	Auth (Verified) Document Title:	MRI Abdomen w/ + w/o Contrast Performed By:	Deacon Huang  Verified By:	Deacon Huang on January 04, 2022 07:47 EST Encounter info:	[id number], [facility], Inpatient, 12/28/2021 - 1/08/2022  * Final Report *  Reason For Exam Liver disease  REPORT EXAM: MRI Abdomen w/ + w/o Contrast  CLINICAL INDICATION: Liver disease.  TECHNIQUE: Multisequence, multiplanar MRI of the abdomen was performed without and with intravenous contrast. [id number]  CONTRAST: 20 cc of Prohance  COMPARISON: 12/10/2021.  FINDINGS:  Lower Thorax: Small to moderate-sized left pleural effusion. Nonspecific nodular signal abnormality in the mid right middle and lower lobes, suboptimally evaluated on MRI.  Liver: No fat or iron. Morphologic changes of chronic liver disease including nodular surface contour, lobar redistribution and fissural widening. No definite focal lesion.   Gallbladder/Biliary Tree: Nonspecific mural edema, may be reactive to chronic liver disease and/or third spacing of fluid.  Spleen: Enlarged measuring up to 15.6 cm. Scattered punctate foci of signal dropout on in phase compared to out of phase imaging suggestive of Gamna-Lucia bodies.  Pancreas: Normal.  Adrenal Glands: Normal.   Kidneys/Ureters: Normal.  Gastrointestinal: Circumferential wall thickening with mural edema involving the ascending colon, likely sequelae of portal colopathy.   Lymph Nodes: Normal.  Vessels: Recanalized paraumbilical vein with large paraesophageal, perigastric and perisplenic varices. Patent portal veins.  Peritoneum/Retroperitoneum: Moderate volume abdominopelvic ascites.  Bones/Soft Tissues: Anasarca. No aggressive osseous lesion.  IMPRESSION:     Examination again suboptimal secondary to moderate volume ascites causing dielectric effect artifact.  Morphologic changes of chronic liver disease with sequelae of portal hypertension including moderate volume ascites, splenomegaly and upper abdominal varices. No definite focal hepatic lesion.  Moderate left pleural effusion. Nonspecific signal abnormality in the right middle and lower lobes, suboptimally evaluated on MRI. This can be further evaluated with dedicated CT chest for evaluation of airspace disease as clinically warranted.   Signature Line *** Final ***  Electronically Signed By:  Deacon Huang on  01/04/2022 07:47  Dictated by:  Deacon Huang   Chest was also looked at but edema limited for the nodularity and they recommended to redo when more diuresed.   Document Type:	CT Chest w/o Contrast Document Date:	January 05, 2022 10:22 EST Document Status:	Auth (Verified) Document Title:	CT Chest w/o Contrast Performed By:	Davey Zuniga  Verified By:	Caitlyn Bledsoe on January 05, 2022 14:35 EST Encounter info:	[id number], [facility], Inpatient, 12/28/2021 - 1/08/2022  * Final Report *  Reason For Exam Mediastinal mass  REPORT EXAM: CT Chest w/o Contrast  CLINICAL INDICATION:  Mediastinal mass. MASS  TECHNIQUE: Multiple-row detector helical CT examination of the chest without IV contrast. Axial, sagittal, and coronal reconstructed images.  COMPARISON:  Chest x-ray 12/31/2021.    FINDINGS:  Support Devices:  None.    Mediastinum: Cardiomegaly with left ventricular dilation. No pericardial effusion. Slightly low-attenuation of ventricular blood pool which may reflect anemia. Thickened appearance of the mid and distal esophagus.  Lymph nodes: No pathologically enlarged mediastinal, hilar, or axillary lymph nodes.     Airways: Central airways are patent. Mucus plugging within the dependent distal basilar airways.  Lungs/Pleura: Groundglass/consolidative airspace opacities within the bilateral lung bases, left greater than right. Peripheral groundglass opacities within the right middle lobe and along the right minor fissure. Bibasilar atelectasis. No pulmonary edema. Small left pleural effusion. No pneumothorax.    Upper abdomen: Large volume abdominal ascites. Mural gallbladder wall thickening. Splenomegaly. Cirrhotic morphology of the liver. These findings are better characterized on MRI of the abdomen dated 1/4/2022. Curvilinear densities face the gastrohepatic ligament likely represent varicosities.  Bones and soft tissues:  Imaged thyroid gland is normal. No acute osseous abnormality multilevel degenerative disc disease with minimal anterior disc osteophytosis. Diffuse anasarca.. Symmetric bilateral gynecomastia    IMPRESSION:   1. Basilar predominant airspace opacities and small left pleural effusion is most consistent with resolving pulmonary edema.  2. The wall of the mid/distal esophagus appears severely thickened. These findings may reflect prominent esophageal varices, given the previously documented sequela of portal hypertension. However, it is difficult to exclude esophageal malignancy and follow-up contrast enhanced CT chest is recommended for further characterization.  3. Peripheral fissural and right middle lobe nodularity. Findings are nonspecific but for further characterization a follow-up CT chest is recommended after resolution of left pleural effusion and when the patient is able to sustain a inspirational breath-hold.  The images were reviewed and interpreted by Caitlyn Bledsoe MD.  He did the egd and banded x 5 and Dr Beauchamp her and need to redo that in 1m. has appt to do with Dr Velarde. Jan 7: Large varices and 5 bands and complete erradication and mild phtn gastropathy.  Pt had forms filled for his work also.  Prior visit with Scarlett: Dec 28  Pt returns today with complaint of increased abdominal distention, sob, and increased weight.  Currently weighs 284 lbs.  Prior weight 215 (estimate) on 10/18/21, 240 (estimate) on 11/15/21, and  274 11/29/21.    MRI 12/10/21 showed large left pleural effusion, has not heard back from pcp in regards to further mgmt of this.  Admits to orthopnea requiring 3-4 pillows.  O2 sat 90% in office.  Presents today in wheelchair due to difficulty with walking secondary to excess weight, swelling in legs, abdomen and SOB.   12/10/21 MRI unfortunately was limited somewhat by your ascites problem. They mention that you had a large layering left pleural effusion with bibasilar atelectasis.  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 They mention he had a shrunken cirrhotic appearing liver there was again limited in its exam but with no obvious lesion but again was limited. The gallbladder and biliary tree showed no discrete abnormality. Spleen was enlarged at 16.5 cm. Pancreas head no gross abnormality.  Neither did the kidneys. Lymph nodes were suboptimally seen but with no gross lymphadenopathy. The portal vein appeared to be grossly patent.  You had a large volume of ascites and some anasarca in your tissues. They recommend given your fluid issues that for the next MRI to do the MRI not too long after after you have a paracentesis done.  That will/could diminish some of the artifact issues that we had this time.   Unfortunately,  not always easy to coordinate these scans as an outpatient with a tap given the limitations of how things are scheduled independently. The hope is that with your hepatitis B being on treatment that the liver labs will cool off as well as some of the factors leading to the speed of this fluid accumulation.  Unfortunately the HBV medicine do take a period of time to drop the HBV levels and inflammation from this particularly if the virus level is higher to drop to start with.  EGD rescheduled for feb 2022 with Dr. Velarde for variceal screening- issue with getting covid test prior to prev scheduled 12/6 egd.    S/p paracentesis with 5L ascites removed on 11/1/21, states more fluid remained but told they could only remove 5L max.    Underwent repeat paracentesis on 12/1/21 with 13.5 L ascites removed.    Continues on Vemlidy, started mid November 2021 with no missed doses.    He has been on furosemide 40 mg QD, and increased to furosemide 40 mg BID 11/29/21 prescribed by Dr. Castaneda PCP for LE edema, on potassium supplement per pcp.   Has not been on aldactone and pcp prefers us to prescribe.    IV abx completed end of Oct 2021 for osteomyelitis, cleared by ID but will be on disability for 2 more months per podiatry.     he saw sebastian dietician re the low sodium diet.  Last a1c 6.5% 11/29/21.    11/29/21 labs- vitamin b12 1099 normal , folate 3.7 normal, glucose 290 (significant elevated, please share with primary MD), creatinine 0.94 normal, monocytes absolute 1.1 high, sodium 131 low, chloride 93 low, calcium 7.9 low, albumin 2.3 (prev 2.8) low, total bili 2.7 high (prev 3.5 on 11/5/21), alk phos 148 elevated (prev 100 11/5/21), ast 57 elevated (prev 61 11/5/21), alt 26 normal (prev 23 11/5/21), hgb 9.9 low (prev 12.2 11/5/21), rbc 3.0 low, platelets 146 low (prev 163).  Please share these labs with primary MD.  Would like to repeat labs again in 2 weeks prior to next office visit on 12/28.  HBV DNA viral load down to 319 million (from prev 890 million prior to starting vemlidy)  10/18/21- TIBC slightly low at 212, iron sat normal at 33, igg 2396 elevated, igm 141, ferritin 403 elevated, hep be ag positive, hep be ab negative, asma negative (prev positive), ama negative, alpha 1 232 elevated but normal phenotype MM, glucose 249 elevated (please share with primary MD), creatinine 1.12 (prev 1.54), potassium low at 3.1 (please share with primary MD as likely secondary to lasix and they may want to adjust potassium supplment dosage or consider adding spironolactone to help with low potassium and fluid retention), albumin low at 3.0 but improved from prior of 2.3, total bili 3.3 elevated, alk phos 81 (prev 69), ast 45 elevated, alt 20, alyssa negative, wbc 6.6, hgb 10.8 low but up from prev 9.0, mcv high at 99, mch 33.8 elevated, rbc 3.2 (please share with primary md), plt 142 low (increased from 92 on 9/24/21), inr 1.2, hep b core ab positive, hep a ab total negative (recommend getting hep a vaccine as not immune), hep b surf ag positive.   RECAP Of Pre visit hx: He has a PMHx of DM2 (last a1c 7.5% on glimepiride and janumet), hx of right great toe infection 8/2021, and presented to the ED on 9/17/21 with erythema/pain/drainage from right 4th toe. He was recently diagnosed with OM of right 4th digit on 9/10/21 and is s/p 4th digit partial metatarsal resection by Dr. Mathew. Patient did not follow up with ID outpatient for antibiotics following 9/10 surgery. Patient started on Vanc/Zosyn in ED on 9/17  He underwent further surgery by Dr. Mathew on 09/19/2021 including right partial 4th metatarsal resection with wound debridement and application of wound VAC, right excisional debridement of the hallux plantar ulcer down to subcutaneous tissues on 9/19.   While admitted  he was diagnosed with hepatitis B, ultrasound of the liver was concerning for possible liver cirrhosis and ascites and recommended to follow up outpatient with GI.   Also noted to have ANGELINA that was improving.  Discharged on 9/25 with IV Zosyn x6wks at outpatient infusion center (Dr. Behari) with wound vac f/u at Dr Mathew's clinic (podiatry).    Pt notes hx of PARDO/fatty liver disease x 10-15 yrs and treated with diet and exercise and monitoring of labs q6m with pcp. Has not seen hepatology for it.  Was noted to have jaundice during admission with T bili 4.0, direct bili 0.7, indirect bili 3.3. Denies heavy etoh use. No hx of iv drug use. Previously just had labs checked with his pcp every 6 months.  No prior egd. Colon 2-3 yrs ago with no polyps  He has had chronic thrombocytopenia since 2017 (platelets in 130's) and as low as 76 on 7/29/21.  Platelets 142 on admission (9/17) and 101 (9/24)  Ascites first noted during sept hospitalization.    He believes he contracted HBV from his now ex-wife who notified him that she had been exposed to HBV after marital infidelity.  He  in July 2021.  Believes last exposure about April 2021.  Labs 9/25/2021: WBC 6.7, hemoglobin 9.0, MCV 96.7, platelets low 92, sodium 137, potassium 3.3 low, creatinine 1.54 high, calcium 7.9 low.  Labs 9/23/2021: HIV negative  Labs 9/22/2021: Hepatitis B viral  million, hepatitis B E antibody nonreactive  Labs 9/21/2021: Sodium 137 glucose 162, albumin low at 2.3, T bili 2.3 high, AST 69 high, ALT 31, alk phos 69, platelets 109 low, hemoglobin 9.7 low  Labs 9/20/2021: INR 1.39, PT 16.4, ASMA positive 1:40, hep B surface antigen positive, creatinine 1.65, alpha-1 antitrypsin 168, hepatitis A antibody IgM nonreactive, hep B core antibody IgM nonreactive, hep C antibody nonreactive, ceruloplasmin 32, AMA negative, AFP 1.1, vitamin D 11 low  Labs 9/19/2021: T bili 3.2, AST 63, ALT 32, alk phos 70, creatinine 1.75, total protein 6.0, albumin 2.3, sodium 136  Labs 9/18/2021: T bili 4.0, direct bili 0.7, indirect bili 3.3, A1c 7.5, COVID-19 negative   (9/18) GB US: FINDINGS: The pancreas is poorly visualized due to technical factors and overlying bowel gas. Liver and gallbladder are not well assessed due to poor acoustic windows. Increased liver echogenicity is suggested. Gallbladder is nonvisualized. There is a negative sonographic Cameron sign. The common bile duct measures 4 mm . The right kidney measures 12.7 cm in greatest longitudinal dimension. No right-sided hydronephrosis. There is a small to moderate amount of ascites demonstrated within the upper abdomen. Liver demonstrates a slightly nodular contour.   IMPRESSION: Summation limited by poor acoustic windows. The gallbladder was not visualized. Liver was not well assessed. Slightly nodular contour the liver suggests which may reflect underlying cirrhosis. Correlate with clinical findings. Diffuse mild increased liver echogenicity which can be seen with diffuse hepatic steatosis versus diffuse infiltration. Correlate with hepatic enzymes. Ascites.   Plan: 1. Do the labs for us now. 2. Plan is to try to cut back lasix to 40mg po qd once weight is down more. 3. Stay on the aldactone. 4. Plan for labs now and weekly. 5. Telemed Scarlett in 1-2 weeks. 6. Plan to do the ct no contrast once at a dry weight as the last ct with edema. 7. Mri in april. 8. See Dr velarde for the egd soon for the banding follow up.  Stressed to pt the need for social distancing and strict handwashing and wearing a mask and to follow any other new or added CDC recommendations as this is an evolving target.  Duration of the visit was 63 min with 10 min of chart prep and then 53 min of the face to face visit with time spent reviewing recent admit and plans and events with the pt and in discussing future plan with the pt today.

## 2022-01-14 NOTE — EXAM-PHYSICAL EXAM
Telemed self reported:  Gen: no distress. Mild bitemporal wasting. Eyes: no jaundice. Mouth: no thrush. CV: no chest pain. Resp: no wheezes. Abd: no pain. Ascites much less now. Ext: some leg edema.	 Neuro: no weakness.

## 2022-01-18 ENCOUNTER — LAB OUTSIDE AN ENCOUNTER (OUTPATIENT)
Dept: URBAN - METROPOLITAN AREA CLINIC 86 | Facility: CLINIC | Age: 52
End: 2022-01-18

## 2022-01-20 ENCOUNTER — LAB OUTSIDE AN ENCOUNTER (OUTPATIENT)
Dept: URBAN - METROPOLITAN AREA TELEHEALTH 2 | Facility: TELEHEALTH | Age: 52
End: 2022-01-20

## 2022-01-20 ENCOUNTER — TELEPHONE ENCOUNTER (OUTPATIENT)
Dept: URBAN - METROPOLITAN AREA CLINIC 92 | Facility: CLINIC | Age: 52
End: 2022-01-20

## 2022-01-20 PROBLEM — 389026000 ASCITES: Status: ACTIVE | Noted: 2022-01-14

## 2022-01-20 LAB
A/G RATIO: 0.7
ALBUMIN: 3.3
ALKALINE PHOSPHATASE: 120
ALT (SGPT): 68
AMBIG ABBREV CMP14 DEFAULT: (no result)
AST (SGOT): 133
BASO (ABSOLUTE): 0.1
BASOS: 1
BILIRUBIN, TOTAL: 2.8
BUN/CREATININE RATIO: 14
BUN: 16
CALCIUM: 8.4
CARBON DIOXIDE, TOTAL: 26
CHLORIDE: 96
CREATININE: 1.11
EGFR IF AFRICN AM: 89
EGFR IF NONAFRICN AM: 77
EOS (ABSOLUTE): 0.1
EOS: 2
GLOBULIN, TOTAL: 4.6
GLUCOSE: 156
HBV LOG10: 4.32
HEMATOCRIT: 32.6
HEMATOLOGY COMMENTS:: (no result)
HEMOGLOBIN: 11.1
HEPATITIS B QUANTITATION: (no result)
IMMATURE CELLS: (no result)
IMMATURE GRANS (ABS): 0
IMMATURE GRANULOCYTES: 0
INR: 1.1
LYMPHS (ABSOLUTE): 1
LYMPHS: 18
MCH: 32.5
MCHC: 34
MCV: 95
MONOCYTES(ABSOLUTE): 0.6
MONOCYTES: 11
NEUTROPHILS (ABSOLUTE): 3.9
NEUTROPHILS: 68
NRBC: (no result)
PLATELETS: 140
POTASSIUM: 4.2
PROTEIN, TOTAL: 7.9
PROTHROMBIN TIME: 12
RBC: 3.42
RDW: 13.8
SODIUM: 135
TEST INFORMATION:: (no result)
WBC: 5.7

## 2022-01-20 NOTE — HPI-TODAY'S VISIT:
Dear Garo Suarez, January 14 labs show INR normal at 1.1 and compared to October 18 that is actually lower than 1.2.  Sugar down to 156 from preadmission levels in the 290.  BUN of 16 creatinine 1.11 sodium 135 potassium 4.2 calcium slightly low at 8.4 but up from 7.93 the last admission when it was 7.9.  Albumin still up at 3.3 and previously had been 2.33 that admission.  Total bilirubin still elevated 2.8.  Alkaline phosphatase now normal at 120.   and ALT 68.  He to follow these labs.  Hep B down to 20,890. White blood cell count 5.7 hemoglobin 11.1 which is diminished.  Platelet count 140.  MCV 95.  Neutrophils 3.9 and lymphocytes 1.0.   One looks at your hospital labs as comparison instead of the nov labs: Jan 8: cr 1.07 and alb 2.7 and tb 3.3 and ast 66 and alt 28.  Jan 7 hg 9.7 and plat 101. Inr 1.57.  HBV [id number] then.  So other than ast and alt. Not on any vitamins. Sometimes as clear the hbv dna, can see a little bump in the lfts. That is called an immune flare and could be sign that you may clear and need weekly labs to watch this. Called pt and reviewed the labs with him. Need lfts next week to follow this. Sees [facility] and redo the labs then. He wants the labs to be sent to [facility] directly. Dr Nolasco

## 2022-01-24 ENCOUNTER — LAB OUTSIDE AN ENCOUNTER (OUTPATIENT)
Dept: URBAN - METROPOLITAN AREA CLINIC 92 | Facility: CLINIC | Age: 52
End: 2022-01-24

## 2022-01-25 ENCOUNTER — LAB OUTSIDE AN ENCOUNTER (OUTPATIENT)
Dept: URBAN - METROPOLITAN AREA CLINIC 86 | Facility: CLINIC | Age: 52
End: 2022-01-25

## 2022-01-25 ENCOUNTER — OFFICE VISIT (OUTPATIENT)
Dept: URBAN - METROPOLITAN AREA CLINIC 86 | Facility: CLINIC | Age: 52
End: 2022-01-25
Payer: COMMERCIAL

## 2022-01-25 VITALS
HEIGHT: 72 IN | DIASTOLIC BLOOD PRESSURE: 74 MMHG | WEIGHT: 205 LBS | TEMPERATURE: 98.3 F | BODY MASS INDEX: 27.77 KG/M2 | SYSTOLIC BLOOD PRESSURE: 133 MMHG | HEART RATE: 93 BPM

## 2022-01-25 DIAGNOSIS — R93.89 ABNORMAL CHEST CT: ICD-10-CM

## 2022-01-25 DIAGNOSIS — I85.00 ESOPHAGEAL  VARICOSE VEINS: ICD-10-CM

## 2022-01-25 DIAGNOSIS — Z71.89 VACCINE COUNSELING: ICD-10-CM

## 2022-01-25 DIAGNOSIS — B19.10 HEPATITIS B INFECTION WITHOUT DELTA AGENT WITHOUT HEPATIC COMA, UNSPECIFIED CHRONICITY: ICD-10-CM

## 2022-01-25 DIAGNOSIS — R76.0 RAISED ANTIBODY TITER: ICD-10-CM

## 2022-01-25 DIAGNOSIS — R18.8 ASCITES OF LIVER: ICD-10-CM

## 2022-01-25 DIAGNOSIS — R74.8 ABNORMAL LIVER ENZYMES: ICD-10-CM

## 2022-01-25 DIAGNOSIS — R60.0 LOWER EXTREMITY EDEMA: ICD-10-CM

## 2022-01-25 DIAGNOSIS — K74.69 OTHER CIRRHOSIS OF LIVER: ICD-10-CM

## 2022-01-25 DIAGNOSIS — M86.9 OSTEOMYELITIS OF RIGHT FOOT, UNSPECIFIED TYPE: ICD-10-CM

## 2022-01-25 DIAGNOSIS — E11.69 TYPE 2 DIABETES MELLITUS WITH OTHER SPECIFIED COMPLICATION, WITHOUT LONG-TERM CURRENT USE OF INSULIN: ICD-10-CM

## 2022-01-25 DIAGNOSIS — Z79.899 HIGH RISK MEDICATION USE: ICD-10-CM

## 2022-01-25 DIAGNOSIS — I51.7 CARDIOMEGALY: ICD-10-CM

## 2022-01-25 DIAGNOSIS — E80.4 GILBERTS SYNDROME: ICD-10-CM

## 2022-01-25 DIAGNOSIS — N17.9 ACUTE KIDNEY INJURY: ICD-10-CM

## 2022-01-25 DIAGNOSIS — K75.81 NASH (NONALCOHOLIC STEATOHEPATITIS): ICD-10-CM

## 2022-01-25 DIAGNOSIS — J90 PLEURAL EFFUSION: ICD-10-CM

## 2022-01-25 PROCEDURE — 99214 OFFICE O/P EST MOD 30 MIN: CPT

## 2022-01-25 RX ORDER — POTASSIUM CHLORIDE 1500 MG/1
1 TABLET WITH FOOD TABLET, FILM COATED, EXTENDED RELEASE ORAL TWICE A DAY
Status: ACTIVE | COMMUNITY

## 2022-01-25 RX ORDER — TENOFOVIR ALAFENAMIDE 25 MG/1
1 TABLET WITH FOOD TABLET ORAL ONCE A DAY
Qty: 30 | Refills: 2 | Status: ACTIVE | COMMUNITY

## 2022-01-25 RX ORDER — FOLIC ACID 1 MG/1
1 TABLET TABLET ORAL ONCE A DAY
Status: ACTIVE | COMMUNITY

## 2022-01-25 RX ORDER — SITAGLIPTIN AND METFORMIN HYDROCHLORIDE 50; 1000 MG/1; MG/1
2 TABLETS WITH EVENING MEAL TABLET, FILM COATED, EXTENDED RELEASE ORAL ONCE A DAY
Status: ACTIVE | COMMUNITY

## 2022-01-25 RX ORDER — SPIRONOLACTONE 50 MG/1
1 TABLET TABLET, FILM COATED ORAL ONCE A DAY
Status: ACTIVE | COMMUNITY

## 2022-01-25 RX ORDER — FUROSEMIDE 40 MG/1
1 TABLET TABLET ORAL TWICE A DAY
Status: ACTIVE | COMMUNITY

## 2022-01-25 RX ORDER — HUMAN INSULIN 100 [IU]/ML
4 UNITS INJECTION, SUSPENSION SUBCUTANEOUS
Status: ON HOLD | COMMUNITY

## 2022-01-25 RX ORDER — METFORMIN HYDROCHLORIDE 500 MG/1
1 TABLET WITH A MEAL TABLET, FILM COATED ORAL BID
Status: ON HOLD | COMMUNITY

## 2022-01-25 RX ORDER — GLIMEPIRIDE 4 MG/1
1 TABLET WITH BREAKFAST OR THE FIRST MAIN MEAL OF THE DAY TABLET ORAL ONCE A DAY
Status: ACTIVE | COMMUNITY

## 2022-01-25 NOTE — HPI-TODAY'S VISIT:
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 A copy of this note will be sent to the referring provider.  Pt doing well since last telehealth visit; however weight is up approx 11 lbs in past 11 days.   Weight prev 194 on 1/14/22 and 205 today.   Will order another paracentesis.  He does note some abdominal distention but less tense than prior to recent admission.   Denies swelling in legs/sob/cough/abdominal pain/signs of gi bleeding/confusion.  Continues on lasix 40 mg bid/aldactone 50 mg/day.  Adhering to low salt diet and limiting fluids.  Still needs to f/u with pcp regarding alternative DM treatment options as on metformin and not recommended with decompensated cirrhosis.  Scheduled for repeat egd 2/1/22 with Dr. Estes.  Will refer to cardiology given cardiomegaly and left ventricular dilation seen on CT chest.  Repeat MRI due April 2022.  Will hold on repeat Ct chest until fluid is optimized.     January 14 labs show INR normal at 1.1 and compared to October 18 that is actually lower than 1.2.  Sugar down to 156 from preadmission levels in the 290.  BUN of 16 creatinine 1.11 sodium 135 potassium 4.2 calcium slightly low at 8.4 but up from 7.93 the last admission when it was 7.9.  Albumin still up at 3.3 and previously had been 2.33 that admission.  Total bilirubin still elevated 2.8.  Alkaline phosphatase now normal at 120.   and ALT 68.  He to follow these labs.  Hep B down to 20,890. White blood cell count 5.7 hemoglobin 11.1 which is diminished.  Platelet count 140.  MCV 95.  Neutrophils 3.9 and lymphocytes 1.0.   One looks at your hospital labs as comparison instead of the nov labs: Jan 8: cr 1.07 and alb 2.7 and tb 3.3 and ast 66 and alt 28.  Jan 7 hg 9.7 and plat 101. Inr 1.57.  HBV 037575 then.  So other than ast and alt. Not on any vitamins. Sometimes as clear the hbv dna, can see a little bump in the lfts. That is called an immune flare and could be sign that you may clear and need weekly labs to watch this. Called pt and reviewed the labs with him. Need lfts next week to follow this. Sees Scarlett Tuesday and redo the labs then. He wants the labs to be sent to [facility] directly.   Pt wanting to be followed here so sees Dr Estes for gi scopes, scheduled 2/1/22 for repeat EGD.  HBV noted and then led to hbv tx and we started the Vemlidy in Nov 2021.  Pt admtited from clinic Dec 28 and found fluid less in the chest and more so in abdomen and had 3 abd taps (prior had 2 taps pre admit). Varied 5 to 15 liters.  IV alb and diuresis and lost 284 to 191.   On lasix 40mg po bid and aldactone 50mg po qd.  HBV level was checked in the hospital.  [id number] eag pos and eab neg. That is coming down from a much higher level at over 890 million.  redid the mri with taps and they saw better but not as well.  Document Type:	MRI Abdomen w/ + w/o Contrast Document Date:	January 03, 2022 18:00 EST Document Status:	Auth (Verified) Document Title:	MRI Abdomen w/ + w/o Contrast Performed By:	Deacon Huang  Verified By:	Deacon Huang on January 04, 2022 07:47 EST Encounter info:	[id number], [facility], Inpatient, 12/28/2021 - 1/08/2022  * Final Report *  Reason For Exam Liver disease  REPORT EXAM: MRI Abdomen w/ + w/o Contrast  CLINICAL INDICATION: Liver disease.  TECHNIQUE: Multisequence, multiplanar MRI of the abdomen was performed without and with intravenous contrast. [id number]  CONTRAST: 20 cc of Prohance  COMPARISON: 12/10/2021.  FINDINGS:  Lower Thorax: Small to moderate-sized left pleural effusion. Nonspecific nodular signal abnormality in the mid right middle and lower lobes, suboptimally evaluated on MRI.  Liver: No fat or iron. Morphologic changes of chronic liver disease including nodular surface contour, lobar redistribution and fissural widening. No definite focal lesion.   Gallbladder/Biliary Tree: Nonspecific mural edema, may be reactive to chronic liver disease and/or third spacing of fluid.  Spleen: Enlarged measuring up to 15.6 cm. Scattered punctate foci of signal dropout on in phase compared to out of phase imaging suggestive of Gamna-Lucia bodies.  Pancreas: Normal.  Adrenal Glands: Normal.   Kidneys/Ureters: Normal.  Gastrointestinal: Circumferential wall thickening with mural edema involving the ascending colon, likely sequelae of portal colopathy.   Lymph Nodes: Normal.  Vessels: Recanalized paraumbilical vein with large paraesophageal, perigastric and perisplenic varices. Patent portal veins.  Peritoneum/Retroperitoneum: Moderate volume abdominopelvic ascites.  Bones/Soft Tissues: Anasarca. No aggressive osseous lesion.  IMPRESSION:     Examination again suboptimal secondary to moderate volume ascites causing dielectric effect artifact.  Morphologic changes of chronic liver disease with sequelae of portal hypertension including moderate volume ascites, splenomegaly and upper abdominal varices. No definite focal hepatic lesion.  Moderate left pleural effusion. Nonspecific signal abnormality in the right middle and lower lobes, suboptimally evaluated on MRI. This can be further evaluated with dedicated CT chest for evaluation of airspace disease as clinically warranted.   Signature Line *** Final ***  Electronically Signed By:  Deacon Huang on  01/04/2022 07:47  Dictated by:  Deacon Huang   Chest was also looked at but edema limited for the nodularity and they recommended to redo when more diuresed.   Document Type:	CT Chest w/o Contrast Document Date:	January 05, 2022 10:22 EST Document Status:	Auth (Verified) Document Title:	CT Chest w/o Contrast Performed By:	Davey Zuniga  Verified By:	Caitlyn Bledsoe on January 05, 2022 14:35 EST Encounter info:	[id number], [facility], Inpatient, 12/28/2021 - 1/08/2022  * Final Report *  Reason For Exam Mediastinal mass  REPORT EXAM: CT Chest w/o Contrast  CLINICAL INDICATION:  Mediastinal mass. MASS  TECHNIQUE: Multiple-row detector helical CT examination of the chest without IV contrast. Axial, sagittal, and coronal reconstructed images.  COMPARISON:  Chest x-ray 12/31/2021.    FINDINGS:  Support Devices:  None.    Mediastinum: Cardiomegaly with left ventricular dilation. No pericardial effusion. Slightly low-attenuation of ventricular blood pool which may reflect anemia. Thickened appearance of the mid and distal esophagus.  Lymph nodes: No pathologically enlarged mediastinal, hilar, or axillary lymph nodes.     Airways: Central airways are patent. Mucus plugging within the dependent distal basilar airways.  Lungs/Pleura: Groundglass/consolidative airspace opacities within the bilateral lung bases, left greater than right. Peripheral groundglass opacities within the right middle lobe and along the right minor fissure. Bibasilar atelectasis. No pulmonary edema. Small left pleural effusion. No pneumothorax.    Upper abdomen: Large volume abdominal ascites. Mural gallbladder wall thickening. Splenomegaly. Cirrhotic morphology of the liver. These findings are better characterized on MRI of the abdomen dated 1/4/2022. Curvilinear densities face the gastrohepatic ligament likely represent varicosities.  Bones and soft tissues:  Imaged thyroid gland is normal. No acute osseous abnormality multilevel degenerative disc disease with minimal anterior disc osteophytosis. Diffuse anasarca.. Symmetric bilateral gynecomastia    IMPRESSION:   1. Basilar predominant airspace opacities and small left pleural effusion is most consistent with resolving pulmonary edema.  2. The wall of the mid/distal esophagus appears severely thickened. These findings may reflect prominent esophageal varices, given the previously documented sequela of portal hypertension. However, it is difficult to exclude esophageal malignancy and follow-up contrast enhanced CT chest is recommended for further characterization.  3. Peripheral fissural and right middle lobe nodularity. Findings are nonspecific but for further characterization a follow-up CT chest is recommended after resolution of left pleural effusion and when the patient is able to sustain a inspirational breath-hold.  The images were reviewed and interpreted by Caitlyn Bledsoe MD.  He did the egd and banded x 5 and Dr Beauchamp her and need to redo that in 1m. has appt to do with Dr Estes. Jan 7: Large varices and 5 bands and complete erradication and mild phtn gastropathy.  Prior visit with Scarlett: Dec 28  Pt returns today with complaint of increased abdominal distention, sob, and increased weight.  Currently weighs 284 lbs.  Prior weight 215 (estimate) on 10/18/21, 240 (estimate) on 11/15/21, and  274 11/29/21.    MRI 12/10/21 showed large left pleural effusion, has not heard back from pcp in regards to further mgmt of this.  Admits to orthopnea requiring 3-4 pillows.  O2 sat 90% in office.  Presents today in wheelchair due to difficulty with walking secondary to excess weight, swelling in legs, abdomen and SOB.   12/10/21 MRI unfortunately was limited somewhat by your ascites problem. They mention that you had a large layering left pleural effusion with bibasilar atelectasis.  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 They mention he had a shrunken cirrhotic appearing liver there was again limited in its exam but with no obvious lesion but again was limited. The gallbladder and biliary tree showed no discrete abnormality. Spleen was enlarged at 16.5 cm. Pancreas head no gross abnormality.  Neither did the kidneys. Lymph nodes were suboptimally seen but with no gross lymphadenopathy. The portal vein appeared to be grossly patent.  You had a large volume of ascites and some anasarca in your tissues. They recommend given your fluid issues that for the next MRI to do the MRI not too long after after you have a paracentesis done.  That will/could diminish some of the artifact issues that we had this time.   Unfortunately,  not always easy to coordinate these scans as an outpatient with a tap given the limitations of how things are scheduled independently. The hope is that with your hepatitis B being on treatment that the liver labs will cool off as well as some of the factors leading to the speed of this fluid accumulation.  Unfortunately the HBV medicine do take a period of time to drop the HBV levels and inflammation from this particularly if the virus level is higher to drop to start with.  S/p paracentesis with 5L ascites removed on 11/1/21, states more fluid remained but told they could only remove 5L max.    Underwent repeat paracentesis on 12/1/21 with 13.5 L ascites removed.     IV abx completed end of Oct 2021 for osteomyelitis, cleared by ID but will be on disability for 2 more months per podiatry.     [facility].  Last a1c 6.5% 11/29/21.    11/29/21 labs- vitamin b12 1099 normal , folate 3.7 normal, glucose 290 (significant elevated, please share with primary MD), creatinine 0.94 normal, monocytes absolute 1.1 high, sodium 131 low, chloride 93 low, calcium 7.9 low, albumin 2.3 (prev 2.8) low, total bili 2.7 high (prev 3.5 on 11/5/21), alk phos 148 elevated (prev 100 11/5/21), ast 57 elevated (prev 61 11/5/21), alt 26 normal (prev 23 11/5/21), hgb 9.9 low (prev 12.2 11/5/21), rbc 3.0 low, platelets 146 low (prev 163).  Please share these labs with primary MD.  Would like to repeat labs again in 2 weeks prior to next office visit on 12/28.  HBV DNA viral load down to 319 million (from prev 890 million prior to starting vemlidy)  10/18/21- TIBC slightly low at 212, iron sat normal at 33, igg 2396 elevated, igm 141, ferritin 403 elevated, hep be ag positive, hep be ab negative, asma negative (prev positive), ama negative, alpha 1 232 elevated but normal phenotype MM, glucose 249 elevated (please share with primary MD), creatinine 1.12 (prev 1.54), potassium low at 3.1 (please share with primary MD as likely secondary to lasix and they may want to adjust potassium supplment dosage or consider adding spironolactone to help with low potassium and fluid retention), albumin low at 3.0 but improved from prior of 2.3, total bili 3.3 elevated, alk phos 81 (prev 69), ast 45 elevated, alt 20, alyssa negative, wbc 6.6, hgb 10.8 low but up from prev 9.0, mcv high at 99, mch 33.8 elevated, rbc 3.2 (please share with primary md), plt 142 low (increased from 92 on 9/24/21), inr 1.2, hep b core ab positive, hep a ab total negative (recommend getting hep a vaccine as not immune), hep b surf ag positive.   RECAP Of Pre visit hx: He has a PMHx of DM2 (last a1c 7.5% on glimepiride and janumet), hx of right great toe infection 8/2021, and presented to the ED on 9/17/21 with erythema/pain/drainage from right 4th toe. He was recently diagnosed with OM of right 4th digit on 9/10/21 and is s/p 4th digit partial metatarsal resection by Dr. Mathew. Patient did not follow up with ID outpatient for antibiotics following 9/10 surgery. Patient started on Vanc/Zosyn in ED on 9/17  He underwent further surgery by Dr. Mathew on 09/19/2021 including right partial 4th metatarsal resection with wound debridement and application of wound VAC, right excisional debridement of the hallux plantar ulcer down to subcutaneous tissues on 9/19.   While admitted  he was diagnosed with hepatitis B, ultrasound of the liver was concerning for possible liver cirrhosis and ascites and recommended to follow up outpatient with GI.   Also noted to have ANGELINA that was improving.  Discharged on 9/25 with IV Zosyn x6wks at outpatient infusion center (Dr. Behari) with wound vac f/u at Dr Mathew's clinic (podiatry).    Pt notes hx of PARDO/fatty liver disease x 10-15 yrs and treated with diet and exercise and monitoring of labs q6m with pcp. Has not seen hepatology for it.  Was noted to have jaundice during admission with T bili 4.0, direct bili 0.7, indirect bili 3.3. Denies heavy etoh use. No hx of iv drug use. Previously just had labs checked with his pcp every 6 months.  No prior egd. Colon 2-3 yrs ago with no polyps  He has had chronic thrombocytopenia since 2017 (platelets in 130's) and as low as 76 on 7/29/21.  Platelets 142 on admission (9/17) and 101 (9/24)  Ascites first noted during sept hospitalization.    He believes he contracted HBV from his now ex-wife who notified him that she had been exposed to HBV after marital infidelity.  He  in July 2021.  Believes last exposure about April 2021.  Labs 9/25/2021: WBC 6.7, hemoglobin 9.0, MCV 96.7, platelets low 92, sodium 137, potassium 3.3 low, creatinine 1.54 high, calcium 7.9 low.  Labs 9/23/2021: HIV negative  Labs 9/22/2021: Hepatitis B viral  million, hepatitis B E antibody nonreactive  Labs 9/21/2021: Sodium 137 glucose 162, albumin low at 2.3, T bili 2.3 high, AST 69 high, ALT 31, alk phos 69, platelets 109 low, hemoglobin 9.7 low  Labs 9/20/2021: INR 1.39, PT 16.4, ASMA positive 1:40, hep B surface antigen positive, creatinine 1.65, alpha-1 antitrypsin 168, hepatitis A antibody IgM nonreactive, hep B core antibody IgM nonreactive, hep C antibody nonreactive, ceruloplasmin 32, AMA negative, AFP 1.1, vitamin D 11 low  Labs 9/19/2021: T bili 3.2, AST 63, ALT 32, alk phos 70, creatinine 1.75, total protein 6.0, albumin 2.3, sodium 136  Labs 9/18/2021: T bili 4.0, direct bili 0.7, indirect bili 3.3, A1c 7.5, COVID-19 negative   (9/18) GB US: FINDINGS: The pancreas is poorly visualized due to technical factors and overlying bowel gas. Liver and gallbladder are not well assessed due to poor acoustic windows. Increased liver echogenicity is suggested. Gallbladder is nonvisualized. There is a negative sonographic Cameron sign. The common bile duct measures 4 mm . The right kidney measures 12.7 cm in greatest longitudinal dimension. No right-sided hydronephrosis. There is a small to moderate amount of ascites demonstrated within the upper abdomen. Liver demonstrates a slightly nodular contour.   IMPRESSION: Summation limited by poor acoustic windows. The gallbladder was not visualized. Liver was not well assessed. Slightly nodular contour the liver suggests which may reflect underlying cirrhosis. Correlate with clinical findings. Diffuse mild increased liver echogenicity which can be seen with diffuse hepatic steatosis versus diffuse infiltration. Correlate with hepatic enzymes. Ascites.

## 2022-01-25 NOTE — EXAM-PHYSICAL EXAM
General: pleasant, well developed, well nourished, no acute distress, non ill appearing Eyes- no scleral icterus HENT: normocephalic, atraumatic head, face mask covering mouth and nose Neck: supple, no JVD Chest: normal breath sounds, normal work of breathing Heart: regular rate and rhythm without murmur Abdomen: soft, non tender, moderately distended (less tense from prior), bowel sounds present, no hepatomegaly, no splenomegaly, + fluid wave Musculoskeletal: normal gait and station, boot on right foot Extremities: 1+ bilateral pedal edema, no asterixis, mild palmar erythema Skin: no rashes, no jaundice Neurologic: no focal deficits, alert and oriented x 3 Psychiatric: stable mood, appropriate affect

## 2022-02-01 ENCOUNTER — LAB OUTSIDE AN ENCOUNTER (OUTPATIENT)
Dept: URBAN - METROPOLITAN AREA CLINIC 86 | Facility: CLINIC | Age: 52
End: 2022-02-01

## 2022-02-01 ENCOUNTER — OFFICE VISIT (OUTPATIENT)
Dept: URBAN - METROPOLITAN AREA MEDICAL CENTER 12 | Facility: MEDICAL CENTER | Age: 52
End: 2022-02-01
Payer: COMMERCIAL

## 2022-02-01 ENCOUNTER — TELEPHONE ENCOUNTER (OUTPATIENT)
Dept: URBAN - METROPOLITAN AREA CLINIC 92 | Facility: CLINIC | Age: 52
End: 2022-02-01

## 2022-02-01 DIAGNOSIS — Z12.11 AVERAGE RISK FOR CRC. DUE TO PT'S CO-MORBID STATE WITH END STAGE DEMENTIA, HIGH RISK FOR ANESTHESIA (PER NEUROLOGY); INABILITY TO TAKE A BOWEL PREP....WOULD NOT ADVISE ANY COLORECTAL CANCER SCREENING INCLUDING STOOL TEST FOR FECAL BLOOD.: ICD-10-CM

## 2022-02-01 LAB
A/G RATIO: 0.4
ALBUMIN: 2.3
ALKALINE PHOSPHATASE: 126
ALT (SGPT): 84
AST (SGOT): 123
BASO (ABSOLUTE): 0.1
BASOS: 2
BILIRUBIN, TOTAL: 2.1
BUN/CREATININE RATIO: 9
BUN: 9
CALCIUM: 8.1
CARBON DIOXIDE, TOTAL: 24
CHLORIDE: 98
CREATININE: 0.99
EGFR IF AFRICN AM: 102
EGFR IF NONAFRICN AM: 88
EOS (ABSOLUTE): 0.2
EOS: 4
GLOBULIN, TOTAL: 5.4
GLUCOSE: 310
HBV LOG10: 4.12
HEMATOCRIT: 33.1
HEMATOLOGY COMMENTS:: (no result)
HEMOGLOBIN: 11.1
HEPATITIS B QUANTITATION: (no result)
IMMATURE CELLS: (no result)
IMMATURE GRANS (ABS): 0
IMMATURE GRANULOCYTES: 0
INR: 1.3
LYMPHS (ABSOLUTE): 1.1
LYMPHS: 19
MCH: 33.1
MCHC: 33.5
MCV: 99
MONOCYTES(ABSOLUTE): 0.6
MONOCYTES: 12
NEUTROPHILS (ABSOLUTE): 3.5
NEUTROPHILS: 63
NRBC: (no result)
PLATELETS: 148
POTASSIUM: 4.7
PROTEIN, TOTAL: 7.7
PROTHROMBIN TIME: 13.1
RBC: 3.35
RDW: 13.8
SODIUM: 134
TEST INFORMATION:: (no result)
WBC: 5.5

## 2022-02-01 PROCEDURE — 992 APS NON BILLABLE: Performed by: INTERNAL MEDICINE

## 2022-02-01 NOTE — HPI-TODAY'S VISIT:
labs 1/25/22- white blood count 5.5 normal, rbc 3.35 low, hemoglobin 11.1 low (prev 11.1), mcv 99 high, platelets 148 low (prev 140), glucose very high at 310 (please follow up with primary md regarding diabetes control), sodium 134 normal,  creatinine 0.99 normal, calcium 8.1 low (please share with primary md), albumin 2.3 low, total bilirubin 2.1 high (improved from 2.8 prev), alk phos 126 high (prev 120), ast 123 high (prev 133), alt 84 high (prev 68), hep b viral load 13,270 (prev 20,890), inr 1.3 (prev 1.1).   MELD 12, MELD-NA 15.    We'll repeat labs next week and continue to monitor MELD score.

## 2022-02-07 ENCOUNTER — LAB OUTSIDE AN ENCOUNTER (OUTPATIENT)
Dept: URBAN - METROPOLITAN AREA TELEHEALTH 2 | Facility: TELEHEALTH | Age: 52
End: 2022-02-07

## 2022-02-08 ENCOUNTER — LAB OUTSIDE AN ENCOUNTER (OUTPATIENT)
Dept: URBAN - METROPOLITAN AREA CLINIC 86 | Facility: CLINIC | Age: 52
End: 2022-02-08

## 2022-02-10 ENCOUNTER — TELEPHONE ENCOUNTER (OUTPATIENT)
Dept: URBAN - METROPOLITAN AREA CLINIC 105 | Facility: CLINIC | Age: 52
End: 2022-02-10

## 2022-02-10 LAB
A/G RATIO: 0.6
ALBUMIN: 2.9
ALKALINE PHOSPHATASE: 141
ALT (SGPT): 104
AST (SGOT): 163
BASO (ABSOLUTE): 0.1
BASOS: 2
BILIRUBIN, TOTAL: 3.7
BUN/CREATININE RATIO: 10
BUN: 9
CALCIUM: 8
CARBON DIOXIDE, TOTAL: 23
CHLORIDE: 100
CREATININE: 0.88
EGFR IF AFRICN AM: 116
EGFR IF NONAFRICN AM: 100
EOS (ABSOLUTE): 0.3
EOS: 5
GLOBULIN, TOTAL: 5
GLUCOSE: 137
HBV LOG10: 4
HEMATOCRIT: 29.5
HEMATOLOGY COMMENTS:: (no result)
HEMOGLOBIN: 10.3
HEPATITIS B QUANTITATION: 9910
IMMATURE CELLS: (no result)
IMMATURE GRANS (ABS): 0
IMMATURE GRANULOCYTES: 0
INR: 1.2
LYMPHS (ABSOLUTE): 1.2
LYMPHS: 23
MCH: 32.4
MCHC: 34.9
MCV: 93
MONOCYTES(ABSOLUTE): 0.6
MONOCYTES: 11
NEUTROPHILS (ABSOLUTE): 3.1
NEUTROPHILS: 59
NRBC: (no result)
PLATELETS: 76
POTASSIUM: 3.9
PROTEIN, TOTAL: 7.9
PROTHROMBIN TIME: 13
RBC: 3.18
RDW: 13.2
SODIUM: 134
TEST INFORMATION:: (no result)
WBC: 5.3

## 2022-02-10 RX ORDER — TENOFOVIR ALAFENAMIDE 25 MG/1
1 TABLET WITH FOOD TABLET ORAL ONCE A DAY
Qty: 30 | Refills: 2
End: 2022-05-15

## 2022-02-12 ENCOUNTER — TELEPHONE ENCOUNTER (OUTPATIENT)
Dept: URBAN - METROPOLITAN AREA CLINIC 92 | Facility: CLINIC | Age: 52
End: 2022-02-12

## 2022-02-12 NOTE — HPI-TODAY'S VISIT:
labs 2/1/22- wbc 5.3 normal, rbc 3.18 low with polychromasia, macrocytes and anisocytosis (please share with primary md), hgb 10.3 low (prev 11.1 and 9.9), platelets 76 low (actual platelet count may be slightly higher than reported due to clumping, prev 148), glucose 137 high but improved (prev 310), creatinine 0.88 normal, sodium 134 normal, calcium low at 8 (please share with primary md), albumin is low at 2.9 (but improved from prev of 2.3), total bili 3.7 high (this is up from prev of 2.1), alk phos 141 high (prev 126), ast 163 high (prev 123), alt 104 high (prev 84), hep b dna 9910 (prev 13,270), inr 1.2.  MELD 13, MELD- NA 16

## 2022-02-15 ENCOUNTER — LAB OUTSIDE AN ENCOUNTER (OUTPATIENT)
Dept: URBAN - METROPOLITAN AREA CLINIC 86 | Facility: CLINIC | Age: 52
End: 2022-02-15

## 2022-02-17 ENCOUNTER — LAB OUTSIDE AN ENCOUNTER (OUTPATIENT)
Dept: URBAN - METROPOLITAN AREA TELEHEALTH 2 | Facility: TELEHEALTH | Age: 52
End: 2022-02-17

## 2022-02-17 ENCOUNTER — OFFICE VISIT (OUTPATIENT)
Dept: URBAN - METROPOLITAN AREA TELEHEALTH 2 | Facility: TELEHEALTH | Age: 52
End: 2022-02-17
Payer: COMMERCIAL

## 2022-02-17 ENCOUNTER — TELEPHONE ENCOUNTER (OUTPATIENT)
Dept: URBAN - METROPOLITAN AREA CLINIC 92 | Facility: CLINIC | Age: 52
End: 2022-02-17

## 2022-02-17 DIAGNOSIS — B19.10 HEPATITIS B INFECTION WITHOUT DELTA AGENT WITHOUT HEPATIC COMA, UNSPECIFIED CHRONICITY: ICD-10-CM

## 2022-02-17 DIAGNOSIS — R18.8 ASCITES OF LIVER: ICD-10-CM

## 2022-02-17 DIAGNOSIS — K74.69 OTHER CIRRHOSIS OF LIVER: ICD-10-CM

## 2022-02-17 DIAGNOSIS — K75.81 NASH (NONALCOHOLIC STEATOHEPATITIS): ICD-10-CM

## 2022-02-17 PROCEDURE — 99215 OFFICE O/P EST HI 40 MIN: CPT

## 2022-02-17 RX ORDER — HUMAN INSULIN 100 [IU]/ML
4 UNITS INJECTION, SUSPENSION SUBCUTANEOUS
Status: DISCONTINUED | COMMUNITY

## 2022-02-17 RX ORDER — SPIRONOLACTONE 50 MG/1
1 TABLET TABLET, FILM COATED ORAL ONCE A DAY
Status: ACTIVE | COMMUNITY

## 2022-02-17 RX ORDER — GLIMEPIRIDE 4 MG/1
1 TABLET WITH BREAKFAST OR THE FIRST MAIN MEAL OF THE DAY TABLET ORAL ONCE A DAY
Status: ACTIVE | COMMUNITY

## 2022-02-17 RX ORDER — FUROSEMIDE 40 MG/1
1 TABLET TABLET ORAL TWICE A DAY
Status: ACTIVE | COMMUNITY

## 2022-02-17 RX ORDER — FOLIC ACID 1 MG/1
1 TABLET TABLET ORAL ONCE A DAY
Status: ACTIVE | COMMUNITY

## 2022-02-17 RX ORDER — TENOFOVIR ALAFENAMIDE 25 MG/1
1 TABLET WITH FOOD TABLET ORAL ONCE A DAY
Qty: 30 | Refills: 2 | Status: ACTIVE | COMMUNITY
End: 2022-05-15

## 2022-02-17 RX ORDER — SITAGLIPTIN AND METFORMIN HYDROCHLORIDE 50; 1000 MG/1; MG/1
2 TABLETS WITH EVENING MEAL TABLET, FILM COATED, EXTENDED RELEASE ORAL ONCE A DAY
Status: ACTIVE | COMMUNITY

## 2022-02-17 RX ORDER — POTASSIUM CHLORIDE 1500 MG/1
1 TABLET WITH FOOD TABLET, FILM COATED, EXTENDED RELEASE ORAL TWICE A DAY
Status: ACTIVE | COMMUNITY

## 2022-02-17 NOTE — HPI-TODAY'S VISIT:
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 A copy of this note will be sent to the referring provider.   labs 2/1/22- wbc 5.3 normal, rbc 3.18 low with polychromasia, macrocytes and anisocytosis (please share with primary md), hgb 10.3 low (prev 11.1 and 9.9), platelets 76 low (actual platelet count may be slightly higher than reported due to clumping, prev 148), glucose 137 high but improved (prev 310), creatinine 0.88 normal, sodium 134 normal, calcium low at 8 (please share with primary md), albumin is low at 2.9 (but improved from prev of 2.3), total bili 3.7 high (this is up from prev of 2.1), alk phos 141 high (prev 126), ast 163 high (prev 123), alt 104 high (prev 84), hep b dna 9910 (prev 13,270), inr 1.2.  MELD 13, MELD- NA 16 labs 1/25/22- white blood count 5.5 normal, rbc 3.35 low, hemoglobin 11.1 low (prev 11.1), mcv 99 high, platelets 148 low (prev 140), glucose very high at 310 (please follow up with primary md regarding diabetes control), sodium 134 normal,  creatinine 0.99 normal, calcium 8.1 low (please share with primary md), albumin 2.3 low, total bilirubin 2.1 high (improved from 2.8 prev), alk phos 126 high (prev 120), ast 123 high (prev 133), alt 84 high (prev 68), hep b viral load 13,270 (prev 20,890), inr 1.3 (prev 1.1).   MELD 12, MELD-NA 15.    We'll repeat labs next week and continue to monitor MELD score.  At visit with Scarlett, pt doing well since last telehealth visit; however weight is up approx 11 lbs in past 11 days.   Weight prev 194 on 1/14/22 and 205 today.   Will order another paracentesis.  He does note some abdominal distention but less tense than prior to recent admission.   Denies swelling in legs/sob/cough/abdominal pain/signs of gi bleeding/confusion.  Continues on lasix 40 mg bid/aldactone 50 mg/day.  Adhering to low salt diet and limiting fluids.  Still needs to f/u with pcp regarding alternative DM treatment options as on metformin and not recommended with decompensated cirrhosis.  Scheduled for repeat egd 2/1/22 with Dr. Estes.  Will refer to cardiology given cardiomegaly and left ventricular dilation seen on CT chest.  Repeat MRI due April 2022.  Will hold on repeat Ct chest until fluid is optimized.     January 14 labs show INR normal at 1.1 and compared to October 18 that is actually lower than 1.2.  Sugar down to 156 from preadmission levels in the 290.  BUN of 16 creatinine 1.11 sodium 135 potassium 4.2 calcium slightly low at 8.4 but up from 7.93 the last admission when it was 7.9.  Albumin still up at 3.3 and previously had been 2.33 that admission.  Total bilirubin still elevated 2.8.  Alkaline phosphatase now normal at 120.   and ALT 68.  He to follow these labs.  Hep B down to 20,890. White blood cell count 5.7 hemoglobin 11.1 which is diminished.  Platelet count 140.  MCV 95.  Neutrophils 3.9 and lymphocytes 1.0.   One looks at your hospital labs as comparison instead of the nov labs: Jan 8: cr 1.07 and alb 2.7 and tb 3.3 and ast 66 and alt 28.  Jan 7 hg 9.7 and plat 101. Inr 1.57.  HBV 850301 then.  So other than ast and alt. Not on any vitamins. Sometimes as clear the hbv dna, can see a little bump in the lfts. That is called an immune flare and could be sign that you may clear and need weekly labs to watch this. Called pt and reviewed the labs with him. Need lfts next week to follow this. Sees Scarlett Tuesday and redo the labs then. He wants the labs to be sent to [facility] directly.   Pt wanting to be followed here so sees Dr Estes for gi scopes, scheduled 2/1/22 for repeat EGD.  HBV noted and then led to hbv tx and we started the Vemlidy in Nov 2021.  Pt admtited from clinic Dec 28 and found fluid less in the chest and more so in abdomen and had 3 abd taps (prior had 2 taps pre admit). Varied 5 to 15 liters.  IV alb and diuresis and lost 284 to 191.   On lasix 40mg po bid and aldactone 50mg po qd.  HBV level was checked in the hospital.  095127 eag pos and eab neg. That is coming down from a much higher level at over 890 million.  redid the mri with taps and they saw better but not as well.  Document Type:	MRI Abdomen w/ + w/o Contrast Document Date:	January 03, 2022 18:00 EST Document Status:	Auth (Verified) Document Title:	MRI Abdomen w/ + w/o Contrast Performed By:	Deacon Huang  Verified By:	Deacon Huang on January 04, 2022 07:47 EST Encounter info:	[id number], [facility], Inpatient, 12/28/2021 - 1/08/2022  * Final Report *  Reason For Exam Liver disease  REPORT EXAM: MRI Abdomen w/ + w/o Contrast  CLINICAL INDICATION: Liver disease.  TECHNIQUE: Multisequence, multiplanar MRI of the abdomen was performed without and with intravenous contrast. [id number]  CONTRAST: 20 cc of Prohance  COMPARISON: 12/10/2021.  FINDINGS:  Lower Thorax: Small to moderate-sized left pleural effusion. Nonspecific nodular signal abnormality in the mid right middle and lower lobes, suboptimally evaluated on MRI.  Liver: No fat or iron. Morphologic changes of chronic liver disease including nodular surface contour, lobar redistribution and fissural widening. No definite focal lesion.   Gallbladder/Biliary Tree: Nonspecific mural edema, may be reactive to chronic liver disease and/or third spacing of fluid.  Spleen: Enlarged measuring up to 15.6 cm. Scattered punctate foci of signal dropout on in phase compared to out of phase imaging suggestive of Gamna-Lucia bodies.  Pancreas: Normal.  Adrenal Glands: Normal.   Kidneys/Ureters: Normal.  Gastrointestinal: Circumferential wall thickening with mural edema involving the ascending colon, likely sequelae of portal colopathy.   Lymph Nodes: Normal.  Vessels: Recanalized paraumbilical vein with large paraesophageal, perigastric and perisplenic varices. Patent portal veins.  Peritoneum/Retroperitoneum: Moderate volume abdominopelvic ascites.  Bones/Soft Tissues: Anasarca. No aggressive osseous lesion.  IMPRESSION:     Examination again suboptimal secondary to moderate volume ascites causing dielectric effect artifact.  Morphologic changes of chronic liver disease with sequelae of portal hypertension including moderate volume ascites, splenomegaly and upper abdominal varices. No definite focal hepatic lesion.  Moderate left pleural effusion. Nonspecific signal abnormality in the right middle and lower lobes, suboptimally evaluated on MRI. This can be further evaluated with dedicated CT chest for evaluation of airspace disease as clinically warranted.   Signature Line *** Final ***  Electronically Signed By:  Deacon Huang on  01/04/2022 07:47  Dictated by:  Deacon Huang   Chest was also looked at but edema limited for the nodularity and they recommended to redo when more diuresed.   Document Type:	CT Chest w/o Contrast Document Date:	January 05, 2022 10:22 EST Document Status:	Auth (Verified) Document Title:	CT Chest w/o Contrast Performed By:	Davey Zuniga  Verified By:	Caitlyn Bledsoe on January 05, 2022 14:35 EST Encounter info:	[id number], [facility], Inpatient, 12/28/2021 - 1/08/2022  * Final Report *  Reason For Exam Mediastinal mass  REPORT EXAM: CT Chest w/o Contrast  CLINICAL INDICATION:  Mediastinal mass. MASS  TECHNIQUE: Multiple-row detector helical CT examination of the chest without IV contrast. Axial, sagittal, and coronal reconstructed images.  COMPARISON:  Chest x-ray 12/31/2021.    FINDINGS:  Support Devices:  None.    Mediastinum: Cardiomegaly with left ventricular dilation. No pericardial effusion. Slightly low-attenuation of ventricular blood pool which may reflect anemia. Thickened appearance of the mid and distal esophagus.  Lymph nodes: No pathologically enlarged mediastinal, hilar, or axillary lymph nodes.     Airways: Central airways are patent. Mucus plugging within the dependent distal basilar airways.  Lungs/Pleura: Groundglass/consolidative airspace opacities within the bilateral lung bases, left greater than right. Peripheral groundglass opacities within the right middle lobe and along the right minor fissure. Bibasilar atelectasis. No pulmonary edema. Small left pleural effusion. No pneumothorax.    Upper abdomen: Large volume abdominal ascites. Mural gallbladder wall thickening. Splenomegaly. Cirrhotic morphology of the liver. These findings are better characterized on MRI of the abdomen dated 1/4/2022. Curvilinear densities face the gastrohepatic ligament likely represent varicosities.  Bones and soft tissues:  Imaged thyroid gland is normal. No acute osseous abnormality multilevel degenerative disc disease with minimal anterior disc osteophytosis. Diffuse anasarca.. Symmetric bilateral gynecomastia    IMPRESSION:   1. Basilar predominant airspace opacities and small left pleural effusion is most consistent with resolving pulmonary edema.  2. The wall of the mid/distal esophagus appears severely thickened. These findings may reflect prominent esophageal varices, given the previously documented sequela of portal hypertension. However, it is difficult to exclude esophageal malignancy and follow-up contrast enhanced CT chest is recommended for further characterization.  3. Peripheral fissural and right middle lobe nodularity. Findings are nonspecific but for further characterization a follow-up CT chest is recommended after resolution of left pleural effusion and when the patient is able to sustain a inspirational breath-hold.  The images were reviewed and interpreted by Caitlyn Bledsoe MD.  He did the egd and banded x 5 and Dr Beauchamp her and need to redo that in 1m. has appt to do with Dr Estes. Jan 7: Large varices and 5 bands and complete erradication and mild phtn gastropathy.  Prior visit with Scarlett: Dec 28  Pt returns today with complaint of increased abdominal distention, sob, and increased weight.  Currently weighs 284 lbs.  Prior weight 215 (estimate) on 10/18/21, 240 (estimate) on 11/15/21, and  274 11/29/21.    MRI 12/10/21 showed large left pleural effusion, has not heard back from pcp in regards to further mgmt of this.  Admits to orthopnea requiring 3-4 pillows.  O2 sat 90% in office.  Presents today in wheelchair due to difficulty with walking secondary to excess weight, swelling in legs, abdomen and SOB.   12/10/21 MRI unfortunately was limited somewhat by your ascites problem. They mention that you had a large layering left pleural effusion with bibasilar atelectasis.  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 They mention he had a shrunken cirrhotic appearing liver there was again limited in its exam but with no obvious lesion but again was limited. The gallbladder and biliary tree showed no discrete abnormality. Spleen was enlarged at 16.5 cm. Pancreas head no gross abnormality.  Neither did the kidneys. Lymph nodes were suboptimally seen but with no gross lymphadenopathy. The portal vein appeared to be grossly patent.  You had a large volume of ascites and some anasarca in your tissues. They recommend given your fluid issues that for the next MRI to do the MRI not too long after after you have a paracentesis done.  That will/could diminish some of the artifact issues that we had this time.   Unfortunately,  not always easy to coordinate these scans as an outpatient with a tap given the limitations of how things are scheduled independently. The hope is that with your hepatitis B being on treatment that the liver labs will cool off as well as some of the factors leading to the speed of this fluid accumulation.  Unfortunately the HBV medicine do take a period of time to drop the HBV levels and inflammation from this particularly if the virus level is higher to drop to start with.  S/p paracentesis with 5L ascites removed on 11/1/21, states more fluid remained but told they could only remove 5L max.    Underwent repeat paracentesis on 12/1/21 with 13.5 L ascites removed.     IV abx completed end of Oct 2021 for osteomyelitis, cleared by ID but will be on disability for 2 more months per podiatry.     he saw sebastian dietician re the low sodium diet.  Last a1c 6.5% 11/29/21.    11/29/21 labs- vitamin b12 1099 normal , folate 3.7 normal, glucose 290 (significant elevated, please share with primary MD), creatinine 0.94 normal, monocytes absolute 1.1 high, sodium 131 low, chloride 93 low, calcium 7.9 low, albumin 2.3 (prev 2.8) low, total bili 2.7 high (prev 3.5 on 11/5/21), alk phos 148 elevated (prev 100 11/5/21), ast 57 elevated (prev 61 11/5/21), alt 26 normal (prev 23 11/5/21), hgb 9.9 low (prev 12.2 11/5/21), rbc 3.0 low, platelets 146 low (prev 163).  Please share these labs with primary MD.  Would like to repeat labs again in 2 weeks prior to next office visit on 12/28.  HBV DNA viral load down to 319 million (from prev 890 million prior to starting vemlidy)  10/18/21- TIBC slightly low at 212, iron sat normal at 33, igg 2396 elevated, igm 141, ferritin 403 elevated, hep be ag positive, hep be ab negative, asma negative (prev positive), ama negative, alpha 1 232 elevated but normal phenotype MM, glucose 249 elevated (please share with primary MD), creatinine 1.12 (prev 1.54), potassium low at 3.1 (please share with primary MD as likely secondary to lasix and they may want to adjust potassium supplment dosage or consider adding spironolactone to help with low potassium and fluid retention), albumin low at 3.0 but improved from prior of 2.3, total bili 3.3 elevated, alk phos 81 (prev 69), ast 45 elevated, alt 20, alyssa negative, wbc 6.6, hgb 10.8 low but up from prev 9.0, mcv high at 99, mch 33.8 elevated, rbc 3.2 (please share with primary md), plt 142 low (increased from 92 on 9/24/21), inr 1.2, hep b core ab positive, hep a ab total negative (recommend getting hep a vaccine as not immune), hep b surf ag positive.   RECAP Of Pre visit hx: He has a PMHx of DM2 (last a1c 7.5% on glimepiride and janumet), hx of right great toe infection 8/2021, and presented to the ED on 9/17/21 with erythema/pain/drainage from right 4th toe. He was recently diagnosed with OM of right 4th digit on 9/10/21 and is s/p 4th digit partial metatarsal resection by Dr. Mathew. Patient did not follow up with ID outpatient for antibiotics following 9/10 surgery. Patient started on Vanc/Zosyn in ED on 9/17  He underwent further surgery by Dr. Mathew on 09/19/2021 including right partial 4th metatarsal resection with wound debridement and application of wound VAC, right excisional debridement of the hallux plantar ulcer down to subcutaneous tissues on 9/19.   While admitted  he was diagnosed with hepatitis B, ultrasound of the liver was concerning for possible liver cirrhosis and ascites and recommended to follow up outpatient with GI.   Also noted to have ANGELINA that was improving.  Discharged on 9/25 with IV Zosyn x6wks at outpatient infusion center (Dr. Behari) with wound vac f/u at Dr Mathew's clinic (podiatry).    Pt notes hx of PARDO/fatty liver disease x 10-15 yrs and treated with diet and exercise and monitoring of labs q6m with pcp. Has not seen hepatology for it.  Was noted to have jaundice during admission with T bili 4.0, direct bili 0.7, indirect bili 3.3. Denies heavy etoh use. No hx of iv drug use. Previously just had labs checked with his pcp every 6 months.  No prior egd. Colon 2-3 yrs ago with no polyps  He has had chronic thrombocytopenia since 2017 (platelets in 130's) and as low as 76 on 7/29/21.  Platelets 142 on admission (9/17) and 101 (9/24)  Ascites first noted during sept hospitalization.    He believes he contracted HBV from his now ex-wife who notified him that she had been exposed to HBV after marital infidelity.  He  in July 2021.  Believes last exposure about April 2021.  Labs 9/25/2021: WBC 6.7, hemoglobin 9.0, MCV 96.7, platelets low 92, sodium 137, potassium 3.3 low, creatinine 1.54 high, calcium 7.9 low.  Labs 9/23/2021: HIV negative  Labs 9/22/2021: Hepatitis B viral  million, hepatitis B E antibody nonreactive  Labs 9/21/2021: Sodium 137 glucose 162, albumin low at 2.3, T bili 2.3 high, AST 69 high, ALT 31, alk phos 69, platelets 109 low, hemoglobin 9.7 low  Labs 9/20/2021: INR 1.39, PT 16.4, ASMA positive 1:40, hep B surface antigen positive, creatinine 1.65, alpha-1 antitrypsin 168, hepatitis A antibody IgM nonreactive, hep B core antibody IgM nonreactive, hep C antibody nonreactive, ceruloplasmin 32, AMA negative, AFP 1.1, vitamin D 11 low  Labs 9/19/2021: T bili 3.2, AST 63, ALT 32, alk phos 70, creatinine 1.75, total protein 6.0, albumin 2.3, sodium 136  Labs 9/18/2021: T bili 4.0, direct bili 0.7, indirect bili 3.3, A1c 7.5, COVID-19 negative   (9/18) GB US: FINDINGS: The pancreas is poorly visualized due to technical factors and overlying bowel gas. Liver and gallbladder are not well assessed due to poor acoustic windows. Increased liver echogenicity is suggested. Gallbladder is nonvisualized. There is a negative sonographic Cameron sign. The common bile duct measures 4 mm . The right kidney measures 12.7 cm in greatest longitudinal dimension. No right-sided hydronephrosis. There is a small to moderate amount of ascites demonstrated within the upper abdomen. Liver demonstrates a slightly nodular contour.   IMPRESSION: Summation limited by poor acoustic windows. The gallbladder was not visualized. Liver was not well assessed. Slightly nodular contour the liver suggests which may reflect underlying cirrhosis. Correlate with clinical findings. Diffuse mild increased liver echogenicity which can be seen with diffuse hepatic steatosis versus diffuse infiltration. Correlate with hepatic enzymes. Ascites.

## 2022-02-17 NOTE — HPI-TODAY'S VISIT:
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  A copy of this note will be sent to the referring provider.  He says that his weight has gained some pounds.   He thinks weight is not clear if fluid related or nutrition.  He says some swelling in legs: lasix bid and aldactone.  He is seeing the kidney doctors and says he was let go from job,.  Trying to get obamacare.  He says he will do labs tomorrow and can go to any lab test and cost less if do that there until gets insurance.  2/1/22- wbc 5.3 normal, rbc 3.18 low with polychromasia, macrocytes and anisocytosis (please share with primary md), hgb 10.3 low (prev 11.1 and 9.9), platelets 76 low (actual platelet count may be slightly higher than reported due to clumping, prev 148), glucose 137 high but improved (prev 310), creatinine 0.88 normal, sodium 134 normal, calcium low at 8 (please share with primary md), albumin is low at 2.9 (but improved from prev of 2.3), total bili 3.7 high (this is up from prev of 2.1), alk phos 141 high (prev 126), ast 163 high (prev 123), alt 104 high (prev 84), hep b dna 9910 (prev 13,270), inr 1.2.  MELD 13, MELD- NA 16 labs emlidy  He is the vemlidy says he ran out of the medicine and 4 days. We have samples of the vemlidy and [facility] is working to get free medicine.  Can flare hep b off the meds.  Issue due to [facility].  1/25/22- white blood count 5.5 normal, rbc 3.35 low, hemoglobin 11.1 low (prev 11.1), mcv 99 high, platelets 148 low (prev 140), glucose very high at 310 (please follow up with primary md regarding diabetes control), sodium 134 normal,  creatinine 0.99 normal, calcium 8.1 low (please share with primary md), albumin 2.3 low, total bilirubin 2.1 high (improved from 2.8 prev), alk phos 126 high (prev 120), ast 123 high (prev 133), alt 84 high (prev 68), hep b viral load 13,270 (prev 20,890), inr 1.3 (prev 1.1).   MELD 12, MELD-NA 15.    We'll repeat labs next week and continue to monitor MELD score. Patient  Weight at home and need to know how doing.  Pt on last telemed with brandon ortiz is up approx 11 lbs in past 11 days.     Weight prev 194 on 1/14/22 and 205 when saw brandon.   He says paracentesis was going to cost 1000.00.  Continues on lasix 40 mg bid/aldactone 50 mg/day.    He has seen primary re sugars and to see him monday.  Scheduled for repeat egd 2/1/22 with Dr. Estes and cancelled due to needing covid 19 tests and had to do and not able to do.   Pt was to see cardiology re  cardiomegaly and left ventricular dilation seen on CT chest in march.  Repeat MRI due April 2022.    January 14 2022 labs show INR normal at 1.1 and compared to October 18 that is actually lower than 1.2.  Sugar down to 156 from preadmission levels in the 290.  BUN of 16 creatinine 1.11 sodium 135 potassium 4.2 calcium slightly low at 8.4 but up from 7.93 the last admission when it was 7.9.  Albumin still up at 3.3 and previously had been 2.33 that admission.  Total bilirubin still elevated 2.8.  Alkaline phosphatase now normal at 120.   and ALT 68.  He to follow these labs.  Hep B down to 20,890. White blood cell count 5.7 hemoglobin 11.1 which is diminished.  Platelet count 140.  MCV 95.  Neutrophils 3.9 and lymphocytes 1.0.    Hospital labs as comparison instead of the nov labs: Jan 8: cr 1.07 and alb 2.7 and tb 3.3 and ast 66 and alt 28.  Jan 7 hg 9.7 and plat 101. Inr 1.57.  HBV 667003 then.   HBV noted and then led to hbv tx and we started the Vemlidy in Nov 2021.  Pt admtited from clinic Dec 28 and found fluid less in the chest and more so in abdomen and had 3 abd taps (prior had 2 taps pre admit). Varied 5 to 15 liters.  IV alb and diuresis and lost 284 to 191.   HBV level was checked in the hospital.  516012 eag pos and eab neg. That is coming down from a much higher level at over 890 million.  redid the mri with taps and they saw better but not as well.  Document Type:	MRI Abdomen w/ + w/o Contrast Document Date:	January 03, 2022 18:00 EST Document Status:	Auth (Verified) Document Title:	MRI Abdomen w/ + w/o Contrast Performed By:	Deacon Huang  Verified By:	Deacon Huang on January 04, 2022 07:47 EST Encounter info:	[id number], [facility], Inpatient, 12/28/2021 - 1/08/2022  * Final Report *  Reason For Exam Liver disease  REPORT EXAM: MRI Abdomen w/ + w/o Contrast  CLINICAL INDICATION: Liver disease.  TECHNIQUE: Multisequence, multiplanar MRI of the abdomen was performed without and with intravenous contrast. [id number]  CONTRAST: 20 cc of Prohance  COMPARISON: 12/10/2021.  FINDINGS:  Lower Thorax: Small to moderate-sized left pleural effusion. Nonspecific nodular signal abnormality in the mid right middle and lower lobes, suboptimally evaluated on MRI.  Liver: No fat or iron. Morphologic changes of chronic liver disease including nodular surface contour, lobar redistribution and fissural widening. No definite focal lesion.   Gallbladder/Biliary Tree: Nonspecific mural edema, may be reactive to chronic liver disease and/or third spacing of fluid.  Spleen: Enlarged measuring up to 15.6 cm. Scattered punctate foci of signal dropout on in phase compared to out of phase imaging suggestive of [place] bodies.  Pancreas: Normal.  Adrenal Glands: Normal.   Kidneys/Ureters: Normal.  Gastrointestinal: Circumferential wall thickening with mural edema involving the ascending colon, likely sequelae of portal colopathy.   Lymph Nodes: Normal.  Vessels: Recanalized paraumbilical vein with large paraesophageal, perigastric and perisplenic varices. Patent portal veins.  Peritoneum/Retroperitoneum: Moderate volume abdominopelvic ascites.  Bones/Soft Tissues: Anasarca. No aggressive osseous lesion.  IMPRESSION:     Examination again suboptimal secondary to moderate volume ascites causing dielectric effect artifact.  Morphologic changes of chronic liver disease with sequelae of portal hypertension including moderate volume ascites, splenomegaly and upper abdominal varices. No definite focal hepatic lesion.  Moderate left pleural effusion. Nonspecific signal abnormality in the right middle and lower lobes, suboptimally evaluated on MRI. This can be further evaluated with dedicated CT chest for evaluation of airspace disease as clinically warranted.   Signature Line *** Final ***  Electronically Signed By:  Deacon Huang on  01/04/2022 07:47  Dictated by:  Deacon Huang   Chest was also looked at but edema limited for the nodularity and they recommended to redo when more diuresed.   Document Type:	CT Chest w/o Contrast Document Date:	January 05, 2022 10:22 EST Document Status:	Auth (Verified) Document Title:	CT Chest w/o Contrast Performed By:	Davey Zuniga  Verified By:	Caitlyn Bledsoe on January 05, 2022 14:35 EST Encounter info:	[id number], [facility], Inpatient, 12/28/2021 - 1/08/2022  * Final Report *  Reason For Exam Mediastinal mass  REPORT EXAM: CT Chest w/o Contrast  CLINICAL INDICATION:  Mediastinal mass. MASS  TECHNIQUE: Multiple-row detector helical CT examination of the chest without IV contrast. Axial, sagittal, and coronal reconstructed images.  COMPARISON:  Chest x-ray 12/31/2021.    FINDINGS:  Support Devices:  None.    Mediastinum: Cardiomegaly with left ventricular dilation. No pericardial effusion. Slightly low-attenuation of ventricular blood pool which may reflect anemia. Thickened appearance of the mid and distal esophagus.  Lymph nodes: No pathologically enlarged mediastinal, hilar, or axillary lymph nodes.     Airways: Central airways are patent. Mucus plugging within the dependent distal basilar airways.  Lungs/Pleura: Groundglass/consolidative airspace opacities within the bilateral lung bases, left greater than right. Peripheral groundglass opacities within the right middle lobe and along the right minor fissure. Bibasilar atelectasis. No pulmonary edema. Small left pleural effusion. No pneumothorax.    Upper abdomen: Large volume abdominal ascites. Mural gallbladder wall thickening. Splenomegaly. Cirrhotic morphology of the liver. These findings are better characterized on MRI of the abdomen dated 1/4/2022. Curvilinear densities face the gastrohepatic ligament likely represent varicosities.  Bones and soft tissues:  Imaged thyroid gland is normal. No acute osseous abnormality multilevel degenerative disc disease with minimal anterior disc osteophytosis. Diffuse anasarca.. Symmetric bilateral gynecomastia    IMPRESSION:   1. Basilar predominant airspace opacities and small left pleural effusion is most consistent with resolving pulmonary edema.  2. The wall of the mid/distal esophagus appears severely thickened. These findings may reflect prominent esophageal varices, given the previously documented sequela of portal hypertension. However, it is difficult to exclude esophageal malignancy and follow-up contrast enhanced CT chest is recommended for further characterization.  3. Peripheral fissural and right middle lobe nodularity. Findings are nonspecific but for further characterization a follow-up CT chest is recommended after resolution of left pleural effusion and when the patient is able to sustain a inspirational breath-hold.  The images were reviewed and interpreted by Caitlyn Bledsoe MD.  He did the egd and banded x 5 and Dr Beauchamp her and need to redo that in 1m. has appt to do with Dr Estes. Jan 7: Large varices and 5 bands and complete erradication and mild phtn gastropathy.  Prior visit with Brandon: Dec 28  Pt returns today with complaint of increased abdominal distention, sob, and increased weight.  Currently weighs 284 lbs.  Prior weight 215 (estimate) on 10/18/21, 240 (estimate) on 11/15/21, and  274 11/29/21.    MRI 12/10/21 showed large left pleural effusion, has not heard back from pcp in regards to further mgmt of this.  Admits to orthopnea requiring 3-4 pillows.  O2 sat 90% in office.  Presents today in wheelchair due to difficulty with walking secondary to excess weight, swelling in legs, abdomen and SOB.   12/10/21 MRI unfortunately was limited somewhat by your ascites problem. They mention that you had a large layering left pleural effusion with bibasilar atelectasis.  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 They mention he had a shrunken cirrhotic appearing liver there was again limited in its exam but with no obvious lesion but again was limited. The gallbladder and biliary tree showed no discrete abnormality. Spleen was enlarged at 16.5 cm. Pancreas head no gross abnormality.  Neither did the kidneys. Lymph nodes were suboptimally seen but with no gross lymphadenopathy. The portal vein appeared to be grossly patent.  You had a large volume of ascites and some anasarca in your tissues. They recommend given your fluid issues that for the next MRI to do the MRI not too long after after you have a paracentesis done.  That will/could diminish some of the artifact issues that we had this time.   Unfortunately,  not always easy to coordinate these scans as an outpatient with a tap given the limitations of how things are scheduled independently. The hope is that with your hepatitis B being on treatment that the liver labs will cool off as well as some of the factors leading to the speed of this fluid accumulation.  Unfortunately the HBV medicine do take a period of time to drop the HBV levels and inflammation from this particularly if the virus level is higher to drop to start with.  S/p paracentesis with 5L ascites removed on 11/1/21, states more fluid remained but told they could only remove 5L max.    Underwent repeat paracentesis on 12/1/21 with 13.5 L ascites removed.     IV abx completed end of Oct 2021 for osteomyelitis, cleared by ID but will be on disability for 2 more months per podiatry.     While in hospital, he saw sebastian dietician re the low sodium diet.  Last a1c 6.5% 11/29/21.    11/29/21 labs- vitamin b12 1099 normal , folate 3.7 normal, glucose 290 (significant elevated, please share with primary MD), creatinine 0.94 normal, monocytes absolute 1.1 high, sodium 131 low, chloride 93 low, calcium 7.9 low, albumin 2.3 (prev 2.8) low, total bili 2.7 high (prev 3.5 on 11/5/21), alk phos 148 elevated (prev 100 11/5/21), ast 57 elevated (prev 61 11/5/21), alt 26 normal (prev 23 11/5/21), hgb 9.9 low (prev 12.2 11/5/21), rbc 3.0 low, platelets 146 low (prev 163).  Please share these labs with primary MD.  Would like to repeat labs again in 2 weeks prior to next office visit on 12/28.  HBV DNA viral load down to 319 million (from prev 890 million prior to starting vemlidy)  10/18/21- TIBC slightly low at 212, iron sat normal at 33, igg 2396 elevated, igm 141, ferritin 403 elevated, hep be ag positive, hep be ab negative, asma negative (prev positive), ama negative, alpha 1 232 elevated but normal phenotype MM, glucose 249 elevated (please share with primary MD), creatinine 1.12 (prev 1.54), potassium low at 3.1 (please share with primary MD as likely secondary to lasix and they may want to adjust potassium supplment dosage or consider adding spironolactone to help with low potassium and fluid retention), albumin low at 3.0 but improved from prior of 2.3, total bili 3.3 elevated, alk phos 81 (prev 69), ast 45 elevated, alt 20, alyssa negative, wbc 6.6, hgb 10.8 low but up from prev 9.0, mcv high at 99, mch 33.8 elevated, rbc 3.2 (please share with primary md), plt 142 low (increased from 92 on 9/24/21), inr 1.2, hep b core ab positive, hep a ab total negative (recommend getting hep a vaccine as not immune), hep b surf ag positive.   RECAP Of Pre visit hx: He has a PMHx of DM2 (last a1c 7.5% on glimepiride and janumet), hx of right great toe infection 8/2021, and presented to the ED on 9/17/21 with erythema/pain/drainage from right 4th toe. He was recently diagnosed with OM of right 4th digit on 9/10/21 and is s/p 4th digit partial metatarsal resection by Dr. Mathew. Patient did not follow up with ID outpatient for antibiotics following 9/10 surgery. Patient started on Vanc/Zosyn in ED on 9/17  He underwent further surgery by Dr. Mathew on 09/19/2021 including right partial 4th metatarsal resection with wound debridement and application of wound VAC, right excisional debridement of the hallux plantar ulcer down to subcutaneous tissues on 9/19.   While admitted  he was diagnosed with hepatitis B, ultrasound of the liver was concerning for possible liver cirrhosis and ascites and recommended to follow up outpatient with GI.   Also noted to have ANGELINA that was improving.  Discharged on 9/25 with IV Zosyn x6wks at outpatient infusion center (Dr. Behari) with wound vac f/u at Dr Mathew's clinic (podiatry).    Pt notes hx of PARDO/fatty liver disease x 10-15 yrs and treated with diet and exercise and monitoring of labs q6m with pcp. Has not seen hepatology for it.  Was noted to have jaundice during admission with T bili 4.0, direct bili 0.7, indirect bili 3.3. Denies heavy etoh use. No hx of iv drug use. Previously just had labs checked with his pcp every 6 months.  No prior egd. Colon 2-3 yrs ago with no polyps  He has had chronic thrombocytopenia since 2017 (platelets in 130's) and as low as 76 on 7/29/21.  Platelets 142 on admission (9/17) and 101 (9/24)  Ascites first noted during sept hospitalization.    He believes he contracted HBV from his now ex-wife who notified him that she had been exposed to HBV after marital infidelity.  He  in July 2021.  Believes last exposure about April 2021.  Labs 9/25/2021: WBC 6.7, hemoglobin 9.0, MCV 96.7, platelets low 92, sodium 137, potassium 3.3 low, creatinine 1.54 high, calcium 7.9 low.  Labs 9/23/2021: HIV negative  Labs 9/22/2021: Hepatitis B viral  million, hepatitis B E antibody nonreactive  Labs 9/21/2021: Sodium 137 glucose 162, albumin low at 2.3, T bili 2.3 high, AST 69 high, ALT 31, alk phos 69, platelets 109 low, hemoglobin 9.7 low  Labs 9/20/2021: INR 1.39, PT 16.4, ASMA positive 1:40, hep B surface antigen positive, creatinine 1.65, alpha-1 antitrypsin 168, hepatitis A antibody IgM nonreactive, hep B core antibody IgM nonreactive, hep C antibody nonreactive, ceruloplasmin 32, AMA negative, AFP 1.1, vitamin D 11 low  Labs 9/19/2021: T bili 3.2, AST 63, ALT 32, alk phos 70, creatinine 1.75, total protein 6.0, albumin 2.3, sodium 136  Labs 9/18/2021: T bili 4.0, direct bili 0.7, indirect bili 3.3, A1c 7.5, COVID-19 negative   (9/18) GB US: FINDINGS: The pancreas is poorly visualized due to technical factors and overlying bowel gas. Liver and gallbladder are not well assessed due to poor acoustic windows. Increased liver echogenicity is suggested. Gallbladder is nonvisualized. There is a negative sonographic Cameron sign. The common bile duct measures 4 mm . The right kidney measures 12.7 cm in greatest longitudinal dimension. No right-sided hydronephrosis. There is a small to moderate amount of ascites demonstrated within the upper abdomen. Liver demonstrates a slightly nodular contour.   IMPRESSION: Summation limited by poor acoustic windows. The gallbladder was not visualized. Liver was not well assessed. Slightly nodular contour the liver suggests which may reflect underlying cirrhosis. Correlate with clinical findings. Diffuse mild increased liver echogenicity which can be seen with diffuse hepatic steatosis versus diffuse infiltration. Correlate with hepatic enzymes. Ascites. H Plan: 1. He is working to get insurance clarified. 2. Pt will do the anylabtest now for 100-120 for a cbc and cmp. 3. Pt will come by to get samples of vemlidy. 4. Batsheva nassar is working on the appeal for free med while has no insurance coverage.   Stressed to pt the need for social distancing and strict handwashing and wearing a mask and to follow any other new or added CDC recommendations as this is an evolving target.  Duration of the visit was 55 minutes with 5 minutes of chart prep and 50  minutes over two sessions of dox video today for the TeleMed visit reviewing recent records. discussing his current status, and the future plans for the patient.

## 2022-02-22 ENCOUNTER — LAB OUTSIDE AN ENCOUNTER (OUTPATIENT)
Dept: URBAN - METROPOLITAN AREA CLINIC 86 | Facility: CLINIC | Age: 52
End: 2022-02-22

## 2022-03-03 ENCOUNTER — LAB OUTSIDE AN ENCOUNTER (OUTPATIENT)
Dept: URBAN - METROPOLITAN AREA TELEHEALTH 2 | Facility: TELEHEALTH | Age: 52
End: 2022-03-03

## 2022-03-17 ENCOUNTER — LAB OUTSIDE AN ENCOUNTER (OUTPATIENT)
Dept: URBAN - METROPOLITAN AREA TELEHEALTH 2 | Facility: TELEHEALTH | Age: 52
End: 2022-03-17

## 2022-03-22 ENCOUNTER — LAB OUTSIDE AN ENCOUNTER (OUTPATIENT)
Dept: URBAN - METROPOLITAN AREA TELEHEALTH 2 | Facility: TELEHEALTH | Age: 52
End: 2022-03-22

## 2022-03-22 ENCOUNTER — OFFICE VISIT (OUTPATIENT)
Dept: URBAN - METROPOLITAN AREA TELEHEALTH 2 | Facility: TELEHEALTH | Age: 52
End: 2022-03-22
Payer: COMMERCIAL

## 2022-03-22 DIAGNOSIS — B19.10 HEPATITIS B INFECTION WITHOUT DELTA AGENT WITHOUT HEPATIC COMA, UNSPECIFIED CHRONICITY: ICD-10-CM

## 2022-03-22 DIAGNOSIS — K75.81 NASH (NONALCOHOLIC STEATOHEPATITIS): ICD-10-CM

## 2022-03-22 DIAGNOSIS — K74.69 OTHER CIRRHOSIS OF LIVER: ICD-10-CM

## 2022-03-22 DIAGNOSIS — R18.8 ASCITES OF LIVER: ICD-10-CM

## 2022-03-22 PROCEDURE — 99215 OFFICE O/P EST HI 40 MIN: CPT

## 2022-03-22 RX ORDER — GLIMEPIRIDE 4 MG/1
1 TABLET WITH BREAKFAST OR THE FIRST MAIN MEAL OF THE DAY TABLET ORAL ONCE A DAY
Status: ACTIVE | COMMUNITY

## 2022-03-22 RX ORDER — FOLIC ACID 1 MG/1
1 TABLET TABLET ORAL ONCE A DAY
Status: ACTIVE | COMMUNITY

## 2022-03-22 RX ORDER — SPIRONOLACTONE 50 MG/1
1 TABLET TABLET, FILM COATED ORAL ONCE A DAY
Status: ACTIVE | COMMUNITY

## 2022-03-22 RX ORDER — POTASSIUM CHLORIDE 1500 MG/1
1 TABLET WITH FOOD TABLET, FILM COATED, EXTENDED RELEASE ORAL TWICE A DAY
Status: ACTIVE | COMMUNITY

## 2022-03-22 RX ORDER — SITAGLIPTIN 100 MG/1
1 TABLET TABLET, FILM COATED ORAL ONCE A DAY
Status: ACTIVE | COMMUNITY

## 2022-03-22 RX ORDER — TENOFOVIR ALAFENAMIDE 25 MG/1
1 TABLET WITH FOOD TABLET ORAL ONCE A DAY
Qty: 30 | Refills: 2 | Status: ACTIVE | COMMUNITY
End: 2022-05-15

## 2022-03-22 RX ORDER — FUROSEMIDE 40 MG/1
1 TABLET TABLET ORAL TWICE A DAY
Status: ACTIVE | COMMUNITY

## 2022-03-22 NOTE — HPI-TODAY'S VISIT:
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   A copy of this note will be sent to the referring provider.  He says that fluid has been an issue and says he was set up 2 weeks ago for a tap and was set up for a tap and he had cancelled due to insurance.  He says weight is at 280. His prior lowest weight is 190 and he thinks he had gained water weight.  He should go to the er and be assessed for admission.  One outpt tap is not going to fix that.  No march labs back yet to us. Any lab test did them and dont have them. He says he has the email from the first one but not sure.  2/1/22- wbc 5.3 normal, rbc 3.18 low with polychromasia, macrocytes and anisocytosis (please share with primary md), hgb 10.3 low (prev 11.1 and 9.9), platelets 76 low (actual platelet count may be slightly higher than reported due to clumping, prev 148), glucose 137 high but improved (prev 310), creatinine 0.88 normal, sodium 134 normal, calcium low at 8 (please share with primary md), albumin is low at 2.9 (but improved from prev of 2.3), total bili 3.7 high (this is up from prev of 2.1), alk phos 141 high (prev 126), ast 163 high (prev 123), alt 104 high (prev 84), hep b dna 9910 (prev 13,270), inr 1.2.  MELD 13, MELD- NA 16.  1/25/22- white blood count 5.5 normal, rbc 3.35 low, hemoglobin 11.1 low (prev 11.1), mcv 99 high, platelets 148 low (prev 140), glucose very high at 310 (please follow up with primary md regarding diabetes control), sodium 134 normal,  creatinine 0.99 normal, calcium 8.1 low (please share with primary md), albumin 2.3 low, total bilirubin 2.1 high (improved from 2.8 prev), alk phos 126 high (prev 120), ast 123 high (prev 133), alt 84 high (prev 68), hep b viral load 13,270 (prev 20,890), inr 1.3 (prev 1.1).   MELD 12, MELD-NA 15.  At visit with Scarlett weight was 205 today.    Not eating well and drinking more water.  Lasix 40 mg bid/aldactone 50 mg/day.    re the dm he has an appt to see someone for this next month for endocrine and sees primary soon.  Scheduled for repeat egd 2/1/22 with Dr. Estes and he says he did not due to insurance.  Referred to cardiology given cardiomegaly and left ventricular dilation seen on CT chest and to see them in april as also cancelled due to insurance.  Also due for repeat MRI due April 2022.  Ct chest until fluid need to be optimized.  January 14 labs show INR normal at 1.1 and compared to October 18 that is actually lower than 1.2.  Sugar down to 156 from preadmission levels in the 290.  BUN of 16 creatinine 1.11 sodium 135 potassium 4.2 calcium slightly low at 8.4 but up from 7.93 the last admission when it was 7.9.  Albumin still up at 3.3 and previously had been 2.33 that admission.  Total bilirubin still elevated 2.8.  Alkaline phosphatase now normal at 120.   and ALT 68.  He to follow these labs.  Hep B down to 20,890. White blood cell count 5.7 hemoglobin 11.1 which is diminished.  Platelet count 140.  MCV 95.  Neutrophils 3.9 and lymphocytes 1.0.   One looks at your hospital labs as comparison instead of the nov labs: Jan 8: cr 1.07 and alb 2.7 and tb 3.3 and ast 66 and alt 28.   Jan 7 hg 9.7 and plat 101. Inr 1.57.  HBV [id number] then.   Sometimes as clear the hbv dna, can see a little bump in the lfts. That is called an immune flare and could be sign that you may clear and need weekly labs to watch this.  HBV noted and then led to hbv tx and we started the Vemlidy in Nov 2021.  Pt admtited from clinic Dec 28 through jan 7 approx and had 3 abd taps (prior had 2 taps pre admit). Varied 5 to 15 liters. IV alb and diuresis and lost 284 to 191.   On lasix 40mg po bid and aldactone 50mg po qd.  HBV level was checked in the hospital.  [id number] eag pos and eab neg. That is coming down from a much higher level at over 890 million.  Document Type:	MRI Abdomen w/ + w/o Contrast Document Date:	January 03, 2022 18:00 EST Document Status:	Auth (Verified) Document Title:	MRI Abdomen w/ + w/o Contrast Performed By:	Deacon Huang  Verified By:	Deacon Huang on January 04, 2022 07:47 EST Encounter info:	[id number], [facility], Inpatient, 12/28/2021 - 1/08/2022  * Final Report *  Reason For Exam Liver disease  REPORT EXAM: MRI Abdomen w/ + w/o Contrast  CLINICAL INDICATION: Liver disease.  TECHNIQUE: Multisequence, multiplanar MRI of the abdomen was performed without and with intravenous contrast. [id number]  CONTRAST: 20 cc of Prohance  COMPARISON: 12/10/2021.  FINDINGS:  Lower Thorax: Small to moderate-sized left pleural effusion. Nonspecific nodular signal abnormality in the mid right middle and lower lobes, suboptimally evaluated on MRI.  Liver: No fat or iron. Morphologic changes of chronic liver disease including nodular surface contour, lobar redistribution and fissural widening. No definite focal lesion.   Gallbladder/Biliary Tree: Nonspecific mural edema, may be reactive to chronic liver disease and/or third spacing of fluid.  Spleen: Enlarged measuring up to 15.6 cm. Scattered punctate foci of signal dropout on in phase compared to out of phase imaging suggestive of Gamna-Gratis bodies.  Pancreas: Normal.  Adrenal Glands: Normal.   Kidneys/Ureters: Normal.  Gastrointestinal: Circumferential wall thickening with mural edema involving the ascending colon, likely sequelae of portal colopathy.   Lymph Nodes: Normal.  Vessels: Recanalized paraumbilical vein with large paraesophageal, perigastric and perisplenic varices. Patent portal veins.  Peritoneum/Retroperitoneum: Moderate volume abdominopelvic ascites.  Bones/Soft Tissues: Anasarca. No aggressive osseous lesion.  IMPRESSION:     Examination again suboptimal secondary to moderate volume ascites causing dielectric effect artifact.  Morphologic changes of chronic liver disease with sequelae of portal hypertension including moderate volume ascites, splenomegaly and upper abdominal varices. No definite focal hepatic lesion.  Moderate left pleural effusion. Nonspecific signal abnormality in the right middle and lower lobes, suboptimally evaluated on MRI. This can be further evaluated with dedicated CT chest for evaluation of airspace disease as clinically warranted.   Signature Line *** Final ***  Electronically Signed By:  Deacon Huang on  01/04/2022 07:47  Dictated by:  Deacon Huang   Chest was also looked at but edema limited for the nodularity and they recommended to redo when more diuresed.   Document Type:	CT Chest w/o Contrast Document Date:	January 05, 2022 10:22 EST Document Status:	Auth (Verified) Document Title:	CT Chest w/o Contrast Performed By:	Davey Zuniga  Verified By:	Caitlyn Bledsoe on January 05, 2022 14:35 EST Encounter info:	[id number], [facility], Inpatient, 12/28/2021 - 1/08/2022  * Final Report *  Reason For Exam Mediastinal mass  REPORT EXAM: CT Chest w/o Contrast  CLINICAL INDICATION:  Mediastinal mass. MASS  TECHNIQUE: Multiple-row detector helical CT examination of the chest without IV contrast. Axial, sagittal, and coronal reconstructed images.  COMPARISON:  Chest x-ray 12/31/2021.    FINDINGS:  Support Devices:  None.    Mediastinum: Cardiomegaly with left ventricular dilation. No pericardial effusion. Slightly low-attenuation of ventricular blood pool which may reflect anemia. Thickened appearance of the mid and distal esophagus.  Lymph nodes: No pathologically enlarged mediastinal, hilar, or axillary lymph nodes.     Airways: Central airways are patent. Mucus plugging within the dependent distal basilar airways.  Lungs/Pleura: Groundglass/consolidative airspace opacities within the bilateral lung bases, left greater than right. Peripheral groundglass opacities within the right middle lobe and along the right minor fissure. Bibasilar atelectasis. No pulmonary edema. Small left pleural effusion. No pneumothorax.    Upper abdomen: Large volume abdominal ascites. Mural gallbladder wall thickening. Splenomegaly. Cirrhotic morphology of the liver. These findings are better characterized on MRI of the abdomen dated 1/4/2022. Curvilinear densities face the gastrohepatic ligament likely represent varicosities.  Bones and soft tissues:  Imaged thyroid gland is normal. No acute osseous abnormality multilevel degenerative disc disease with minimal anterior disc osteophytosis. Diffuse anasarca.. Symmetric bilateral gynecomastia    IMPRESSION:   1. Basilar predominant airspace opacities and small left pleural effusion is most consistent with resolving pulmonary edema.  2. The wall of the mid/distal esophagus appears severely thickened. These findings may reflect promt documented sequela of portal hypertension. However, it is difficult to exclude esophageal malignancy and follow-up contrast enhanced CT chest is recommended for further characterization.  3. Peripheral fissural and right middle lobe nodularity. Findings are nonspecific but for further characterization a follow-up CT chest is recommended after resolution of left pleural effusion and when the patient is able to sustain a inspirational breath-hold.  The images were reviewed and interpreted by Caitlyn Bledsoe MD.  He did the egd and banded x 5 and Dr Beauchamp her and need to redo that in 1m. has appt to do with Dr Estes. Jan 7: Large varices and 5 bands and complete erradication and mild phtn gastropathy.  Needs to be caught up.  Prior visit with Scarlett: Dec 28  Pt returns today with complaint of increased abdominal distention, sob, and increased weight.  Currently weighs 284 lbs.  Prior weight 215 (estimate) on 10/18/21, 240 (estimate) on 11/15/21, and  274 11/29/21.    MRI 12/10/21 showed large left pleural effusion, has not heard back from pcp in regards to further mgmt of this.  Admits to orthopnea requiring 3-4 pillows.  O2 sat 90% in office.  Presents today in wheelchair due to difficulty with walking secondary to excess weight, swelling in legs, abdomen and SOB.   12/10/21 MRI unfortunately was limited somewhat by your ascites problem. They mention that you had a large layering left pleural effusion with bibasilar atelectasis.  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 They mention he had a shrunken cirrhotic appearing liver there was again limited in its exam but with no obvious lesion but again was limited. The gallbladder and biliary tree showed no discrete abnormality. Spleen was enlarged at 16.5 cm. Pancreas head no gross abnormality.  Neither did the kidneys. Lymph nodes were suboptimally seen but with no gross lymphadenopathy. The portal vein appeared to be grossly patent.  You had a large volume of ascites and some anasarca in your tissues. They recommend given your fluid issues that for the next MRI to do the MRI not too long after after you have a paracentesis done.  That will/could diminish some of the artifact issues that we had this time.   Unfortunately,  not always easy to coordinate these scans as an outpatient with a tap given the limitations of how things are scheduled independently. The hope is that with your hepatitis B being on treatment that the liver labs will cool off as well as some of the factors leading to the speed of this fluid accumulation.  Unfortunately the HBV medicine do take a period of time to drop the HBV levels and inflammation from this particularly if the virus level is higher to drop to start with.  S/p paracentesis with 5L ascites removed on 11/1/21, states more fluid remained but told they could only remove 5L max.    Underwent repeat paracentesis on 12/1/21 with 13.5 L ascites removed.     IV abx completed end of Oct 2021 for osteomyelitis, cleared by ID but will be on disability for 2 more months per podiatry.     he saw [facility] dietician re the low sodium diet.  Last a1c 6.5% 11/29/21.    11/29/21 labs- vitamin b12 1099 normal , folate 3.7 normal, glucose 290 (significant elevated, please share with primary MD), creatinine 0.94 normal, monocytes absolute 1.1 high, sodium 131 low, chloride 93 low, calcium 7.9 low, albumin 2.3 (prev 2.8) low, total bili 2.7 high (prev 3.5 on 11/5/21), alk phos 148 elevated (prev 100 11/5/21), ast 57 elevated (prev 61 11/5/21), alt 26 normal (prev 23 11/5/21), hgb 9.9 low (prev 12.2 11/5/21), rbc 3.0 low, platelets 146 low (prev 163).  Please share these labs with primary MD.  Would like to repeat labs again in 2 weeks prior to next office visit on 12/28.  HBV DNA viral load down to 319 million (from prev 890 million prior to starting vemlidy)  10/18/21- TIBC slightly low at 212, iron sat normal at 33, igg 2396 elevated, igm 141, ferritin 403 elevated, hep be ag positive, hep be ab negative, asma negative (prev positive), ama negative, alpha 1 232 elevated but normal phenotype MM, glucose 249 elevated (please share with primary MD), creatinine 1.12 (prev 1.54), potassium low at 3.1 (please share with primary MD as likely secondary to lasix and they may want to adjust potassium supplment dosage or consider adding spironolactone to help with low potassium and fluid retention), albumin low at 3.0 but improved from prior of 2.3, total bili 3.3 elevated, alk phos 81 (prev 69), ast 45 elevated, alt 20, alyssa negative, wbc 6.6, hgb 10.8 low but up from prev 9.0, mcv high at 99, mch 33.8 elevated, rbc 3.2 (please share with primary md), plt 142 low (increased from 92 on 9/24/21), inr 1.2, hep b core ab positive, hep a ab total negative (recommend getting hep a vaccine as not immune), hep b surf ag positive.   RECAP Of Pre visit hx: He has a PMHx of DM2 (last a1c 7.5% on glimepiride and janumet), hx of right great toe infection 8/2021, and presented to the ED on 9/17/21 with erythema/pain/drainage from right 4th toe. He was recently diagnosed with OM of right 4th digit on 9/10/21 and is s/p 4th digit partial metatarsal resection by Dr. Mathew. Patient did not follow up with ID outpatient for antibiotics following 9/10 surgery. Patient started on Vanc/Zosyn in ED on 9/17  He underwent further surgery by Dr. Mathew on 09/19/2021 including right partial 4th metatarsal resection with wound debridement and application of wound VAC, right excisional debridement of the hallux plantar ulcer down to subcutaneous tissues on 9/19.   While admitted  he was diagnosed with hepatitis B, ultrasound of the liver was concerning for possible liver cirrhosis and ascites and recommended to follow up outpatient with GI.   Also noted to have ANGELINA that was improving.  Discharged on 9/25 with IV Zosyn x6wks at outpatient infusion center (Dr. Behari) with wound vac f/u at Dr Mathew's clinic (podiatry).    Pt notes hx of PARDO/fatty liver disease x 10-15 yrs and treated with diet and exercise and monitoring of labs q6m with pcp. Has not seen hepatology for it.  Was noted to have jaundice during admission with T bili 4.0, direct bili 0.7, indirect bili 3.3. Denies heavy etoh use. No hx of iv drug use. Previously just had labs checked with his pcp every 6 months.  No prior egd. Colon 2-3 yrs ago with no polyps  He has had chronic thrombocytopenia since 2017 (platelets in 130's) and as low as 76 on 7/29/21.  Platelets 142 on admission (9/17) and 101 (9/24)  Ascites first noted during sept hospitalization.    He believes he contracted HBV from his now ex-wife who notified him that she had been exposed to HBV after marital infidelity.  He  in July 2021.  Believes last exposure about April 2021.  Labs 9/25/2021: WBC 6.7, hemoglobin 9.0, MCV 96.7, platelets low 92, sodium 137, potassium 3.3 low, creatinine 1.54 high, calcium 7.9 low.  Labs 9/23/2021: HIV negative  Labs 9/22/2021: Hepatitis B viral  million, hepatitis B E antibody nonreactive  Labs 9/21/2021: Sodium 137 glucose 162, albumin low at 2.3, T bili 2.3 high, AST 69 high, ALT 31, alk phos 69, platelets 109 low, hemoglobin 9.7 low  Labs 9/20/2021: INR 1.39, PT 16.4, ASMA positive 1:40, hep B surface antigen positive, creatinine 1.65, alpha-1 antitrypsin 168, hepatitis A antibody IgM nonreactive, hep B core antibody IgM nonreactive, hep C antibody nonreactive, ceruloplasmin 32, AMA negative, AFP 1.1, vitamin D 11 low  Labs 9/19/2021: T bili 3.2, AST 63, ALT 32, alk phos 70, creatinine 1.75, total protein 6.0, albumin 2.3, sodium 136  Labs 9/18/2021: T bili 4.0, direct bili 0.7, indirect bili 3.3, A1c 7.5, COVID-19 negative   (9/18) GB US: FINDINGS: The pancreas is poorly visualized due to technical factors and overlying bowel gas. Liver and gallbladder are not well assessed due to poor acoustic windows. Increased liver echogenicity is suggested. Gallbladder is nonvisualized. There is a negative sonographic Cameron sign. The common bile duct measures 4 mm . The right kidney measures 12.7 cm in greatest longitudinal dimension. No right-sided hydronephrosis. There is a small to moderate amount of ascites demonstrated within the upper abdomen. Liver demonstrates a slightly nodular contour.   IMPRESSION: Summation limited by poor acoustic windows. The gallbladder was not visualized. Liver was not well assessed. Slightly nodular contour the liver suggests which may reflect underlying cirrhosis. Correlate with clinical findings. Diffuse mild increased liver echogenicity which can be seen with diffuse hepatic steatosis versus diffuse infiltration. Correlate with hepatic enzymes. Ascites.   Plan: 1. Need to come to er to be assessed and given 100 pounds weight gain he marcus be admitted. 2, Need a tap and iv alb and need renal team to see pending cr. 3. Need to check mri. 4. Need to get the egd and banding. 5. Suspect one outpt tap not to fix this. 6. Pt had lost insurance and he lapsed on this.  Stressed to pt the need for social distancing and strict handwashing and wearing a mask and to follow any other new or added CDC recommendations as this is an evolving target.  Duration of the visit was 42 minutes with 5 minutes of chart prep and 37 minutes via dox video for this TeleMed visit reviewing recent records current status and future plans for the patient.

## 2022-03-24 ENCOUNTER — OUT OF OFFICE VISIT (OUTPATIENT)
Dept: URBAN - METROPOLITAN AREA MEDICAL CENTER 12 | Facility: MEDICAL CENTER | Age: 52
End: 2022-03-24
Payer: COMMERCIAL

## 2022-03-24 DIAGNOSIS — K76.9 CHRONIC LIVER DISEASE: ICD-10-CM

## 2022-03-24 DIAGNOSIS — Z79.899 ADALIMUMAB (HUMIRA) LONG-TERM USE: ICD-10-CM

## 2022-03-24 DIAGNOSIS — Z87.19 H/O ACUTE PANCREATITIS: ICD-10-CM

## 2022-03-24 DIAGNOSIS — B18.1 CHRONIC HEPATITIS B: ICD-10-CM

## 2022-03-24 PROCEDURE — G8427 DOCREV CUR MEDS BY ELIG CLIN: HCPCS

## 2022-03-24 PROCEDURE — 99223 1ST HOSP IP/OBS HIGH 75: CPT

## 2022-03-24 PROCEDURE — 992 NON-BILLABLE

## 2022-03-25 ENCOUNTER — CLAIMS CREATED FROM THE CLAIM WINDOW (OUTPATIENT)
Dept: URBAN - METROPOLITAN AREA MEDICAL CENTER 12 | Facility: MEDICAL CENTER | Age: 52
End: 2022-03-25
Payer: COMMERCIAL

## 2022-03-25 ENCOUNTER — OUT OF OFFICE VISIT (OUTPATIENT)
Dept: URBAN - METROPOLITAN AREA MEDICAL CENTER 12 | Facility: MEDICAL CENTER | Age: 52
End: 2022-03-25

## 2022-03-25 DIAGNOSIS — K74.69 CIRRHOSIS, CRYPTOGENIC: ICD-10-CM

## 2022-03-25 DIAGNOSIS — R18.8 ABDOMINAL ASCITES: ICD-10-CM

## 2022-03-25 DIAGNOSIS — I85.10 ESOPH VARICE OTHER DIS: ICD-10-CM

## 2022-03-25 DIAGNOSIS — B18.1 CARRIER OF HEPATITIS B: ICD-10-CM

## 2022-03-25 PROCEDURE — 99232 SBSQ HOSP IP/OBS MODERATE 35: CPT | Performed by: INTERNAL MEDICINE

## 2022-03-25 PROCEDURE — 43244 EGD VARICES LIGATION: CPT | Performed by: INTERNAL MEDICINE

## 2022-04-02 ENCOUNTER — OUT OF OFFICE VISIT (OUTPATIENT)
Dept: URBAN - METROPOLITAN AREA MEDICAL CENTER 12 | Facility: MEDICAL CENTER | Age: 52
End: 2022-04-02
Payer: COMMERCIAL

## 2022-04-02 DIAGNOSIS — R18.8 ABDOMINAL ASCITES: ICD-10-CM

## 2022-04-02 DIAGNOSIS — I85.10 ESOPH VARICE OTHER DIS: ICD-10-CM

## 2022-04-02 DIAGNOSIS — K74.69 CIRRHOSIS, CRYPTOGENIC: ICD-10-CM

## 2022-04-02 PROCEDURE — 99232 SBSQ HOSP IP/OBS MODERATE 35: CPT | Performed by: INTERNAL MEDICINE

## 2022-04-13 ENCOUNTER — TELEPHONE ENCOUNTER (OUTPATIENT)
Dept: URBAN - METROPOLITAN AREA CLINIC 86 | Facility: CLINIC | Age: 52
End: 2022-04-13

## 2022-04-13 RX ORDER — TENOFOVIR ALAFENAMIDE 25 MG/1
1 TABLET WITH FOOD TABLET ORAL ONCE A DAY
Qty: 30 | Refills: 2
End: 2022-07-12

## 2022-04-29 ENCOUNTER — CLAIMS CREATED FROM THE CLAIM WINDOW (OUTPATIENT)
Dept: URBAN - METROPOLITAN AREA TELEHEALTH 2 | Facility: TELEHEALTH | Age: 52
End: 2022-04-29
Payer: COMMERCIAL

## 2022-04-29 ENCOUNTER — LAB OUTSIDE AN ENCOUNTER (OUTPATIENT)
Dept: URBAN - METROPOLITAN AREA TELEHEALTH 2 | Facility: TELEHEALTH | Age: 52
End: 2022-04-29

## 2022-04-29 ENCOUNTER — OFFICE VISIT (OUTPATIENT)
Dept: URBAN - METROPOLITAN AREA CLINIC 86 | Facility: CLINIC | Age: 52
End: 2022-04-29

## 2022-04-29 DIAGNOSIS — E80.4 GILBERTS SYNDROME: ICD-10-CM

## 2022-04-29 DIAGNOSIS — Z71.89 VACCINE COUNSELING: ICD-10-CM

## 2022-04-29 DIAGNOSIS — I85.00 ESOPHAGEAL  VARICOSE VEINS: ICD-10-CM

## 2022-04-29 DIAGNOSIS — Z79.899 HIGH RISK MEDICATION USE: ICD-10-CM

## 2022-04-29 DIAGNOSIS — K75.81 NASH (NONALCOHOLIC STEATOHEPATITIS): ICD-10-CM

## 2022-04-29 DIAGNOSIS — N17.9 ACUTE KIDNEY INJURY: ICD-10-CM

## 2022-04-29 DIAGNOSIS — I51.7 CARDIOMEGALY: ICD-10-CM

## 2022-04-29 DIAGNOSIS — J90 PLEURAL EFFUSION: ICD-10-CM

## 2022-04-29 DIAGNOSIS — E11.69 TYPE 2 DIABETES MELLITUS WITH OTHER SPECIFIED COMPLICATION, WITHOUT LONG-TERM CURRENT USE OF INSULIN: ICD-10-CM

## 2022-04-29 DIAGNOSIS — K74.69 OTHER CIRRHOSIS OF LIVER: ICD-10-CM

## 2022-04-29 DIAGNOSIS — B19.10 HEPATITIS B INFECTION WITHOUT DELTA AGENT WITHOUT HEPATIC COMA, UNSPECIFIED CHRONICITY: ICD-10-CM

## 2022-04-29 DIAGNOSIS — R76.0 RAISED ANTIBODY TITER: ICD-10-CM

## 2022-04-29 DIAGNOSIS — M86.9 OSTEOMYELITIS OF RIGHT FOOT, UNSPECIFIED TYPE: ICD-10-CM

## 2022-04-29 DIAGNOSIS — R74.8 ABNORMAL LIVER ENZYMES: ICD-10-CM

## 2022-04-29 DIAGNOSIS — R18.8 ASCITES OF LIVER: ICD-10-CM

## 2022-04-29 DIAGNOSIS — R60.0 LOWER EXTREMITY EDEMA: ICD-10-CM

## 2022-04-29 DIAGNOSIS — R93.89 ABNORMAL CHEST CT: ICD-10-CM

## 2022-04-29 PROBLEM — 61977001 CHRONIC TYPE B VIRAL HEPATITIS: Status: ACTIVE | Noted: 2022-01-14

## 2022-04-29 PROBLEM — 255417007 CIRRHOTIC: Status: ACTIVE | Noted: 2022-01-14

## 2022-04-29 PROCEDURE — 99215 OFFICE O/P EST HI 40 MIN: CPT

## 2022-04-29 RX ORDER — SPIRONOLACTONE 50 MG/1
1 TABLET TABLET, FILM COATED ORAL TWICE A DAY
Status: ACTIVE | COMMUNITY

## 2022-04-29 RX ORDER — SPIRONOLACTONE 50 MG/1
1 TABLET TABLET, FILM COATED ORAL
Qty: 60 | Refills: 1

## 2022-04-29 RX ORDER — FUROSEMIDE 40 MG/1
1 TABLET TABLET ORAL TWICE A DAY
Status: ACTIVE | COMMUNITY

## 2022-04-29 RX ORDER — FUROSEMIDE 40 MG/1
1 TABLET TABLET ORAL TWICE A DAY
Qty: 60 | Refills: 1

## 2022-04-29 RX ORDER — FOLIC ACID 1 MG/1
1 TABLET TABLET ORAL ONCE A DAY
Qty: 30 | Refills: 1

## 2022-04-29 RX ORDER — TENOFOVIR ALAFENAMIDE 25 MG/1
1 TABLET WITH FOOD TABLET ORAL ONCE A DAY
Qty: 30 | Refills: 2 | Status: ACTIVE | COMMUNITY
End: 2022-07-12

## 2022-04-29 RX ORDER — FOLIC ACID 1 MG/1
1 TABLET TABLET ORAL ONCE A DAY
Status: ACTIVE | COMMUNITY

## 2022-04-29 RX ORDER — POTASSIUM CHLORIDE 1500 MG/1
1 TABLET WITH FOOD TABLET, FILM COATED, EXTENDED RELEASE ORAL TWICE A DAY
Status: DISCONTINUED | COMMUNITY

## 2022-04-29 RX ORDER — SITAGLIPTIN 100 MG/1
1 TABLET TABLET, FILM COATED ORAL ONCE A DAY
Status: DISCONTINUED | COMMUNITY

## 2022-04-29 RX ORDER — GLIMEPIRIDE 4 MG/1
1 TABLET WITH BREAKFAST OR THE FIRST MAIN MEAL OF THE DAY TABLET ORAL ONCE A DAY
Status: ACTIVE | COMMUNITY

## 2022-04-29 RX ORDER — SAXAGLIPTIN 5 MG/1
1 TABLET TABLET, FILM COATED ORAL ONCE A DAY
Status: ACTIVE | COMMUNITY

## 2022-04-29 NOTE — HPI-TODAY'S VISIT:
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  A copy of this note will be sent to the referring provider.  Pt says 207 and his lowest 195 and he thinks he may be doing to eating.  March 3 taps and lost to 195-197 range of weight.  He has some feet swell. He is in [place] but mother is ill.  As for abd he has some.  Lasix 40mg po bid. Aldactone 50 mg po bid.  Just ran out of aldactone. He has been off that a week.  Prior visit he was to 280 and had delayed coming for the ascites eval due to insurance issues. He came in and was admitted post that and stayed for little over a week.  March 31 2022 labs when left na 138 and k 3.8 and cl99 and co2 29 and glu 190 and cr 0.88 and alb 3.8 and tv 3.5 and tb 3.5,ast 48 and alt 17 and alk 45 and wbc 3.8 hg 10.1.  March 23 tap less than 1.5 alb  and alb less than 3 and nuc 89 and neutrophils 3%   Document Type:	MRI Abdomen w/ + w/o Contrast Document Date:	April 01, 2022 19:51 EDT Document Status:	Auth (Verified) Document Title:	MRI Abdomen w/ + w/o Contrast Performed By:	Jose Juan Omalley  Verified By:	Jojo Salcedo on April 03, 2022 10:32 EDT Encounter info:	[id number], [facility], Inpatient, 3/23/2022 - 4/02/2022  * Final Report *  Reason For Exam Liver disease; hcc screen;Liver disease  REPORT EXAM: MRI Abdomen w/ + w/o Contrast  CLINICAL INDICATION: History of hepatitis B cirrhosis with ascites. And esophageal varices status post endoscopic banding on 3/25/2022. Evaluate for HCC.  TECHNIQUE: Multisequence, multiplanar MRI of the abdomen was performed without and with intravenous contrast. ESRC.2.7.3  CONTRAST: 17cc cc of Prohance  COMPARISON: MRI abdomen 1/3/2022  FINDINGS:  Lower Thorax: Small left pleural effusion and left lower lobe atelectasis similar to prior.  Liver: No fat or iron. Cirrhotic liver morphology similar to prior. No lesions.  Gallbladder/Biliary Tree: Mild diffuse gallbladder wall thickening related to third spacing/underlying liver disease. No stones. No biliary ductal dilatation.  Spleen: Unchanged splenomegaly measuring up to 16.5 cm in craniocaudal length. Small curvilinear T2 hypointensity along the lateral subcapsular region compatible with small remote intracapsular bleed.  Pancreas: Normal.  Adrenal Glands: Normal.   Kidneys/Ureters: Normal.  Gastrointestinal: Normal.   Lymph Nodes: No pathologically enlarged lymph nodes.  Vessels: Unchanged large paraesophageal varices. Additionally there are perigastric, perisplenic, and peritoneal/retroperitoneal varices are similar to prior exam. Recanalized paraumbilical vein. No aortic aneurysm. Patent portal vein, splenic vein, hepatic veins, and SMV.  Peritoneum/Retroperitoneum: Large volume ascites similar to prior. Unchanged smooth diffuse peritoneal enhancement without focal nodularity, similar to prior.  Bones/Soft Tissues: Expected marrow signal. No worrisome osseous lesions.  IMPRESSION:     1.  Cirrhotic liver morphology without evidence of hepatocellular carcinoma. 2.  Sequela of portal hypertension, including large volume ascites, splenomegaly, and upper abdominal varices. 3.  Unchanged diffuse mild peritoneal enhancement which could be from chronic peritoneal irritation from multiple prior paracenteses. However, if there is concern for peritoneal infection, recommend fluid sampling.  The images were reviewed and interpreted by Jojo Salcedo MD.  Signature Line *** Final ***  Electronically Signed By:  Jojo Salcedo on  04/03/2022 10:32  Dictated by:  Jose Juan Omalley   2/1/22- wbc 5.3 normal, rbc 3.18 low with polychromasia, macrocytes and anisocytosis (please share with primary md), hgb 10.3 low (prev 11.1 and 9.9), platelets 76 low (actual platelet count may be slightly higher than reported due to clumping, prev 148), glucose 137 high but improved (prev 310), creatinine 0.88 normal, sodium 134 normal, calcium low at 8 (please share with primary md), albumin is low at 2.9 (but improved from prev of 2.3), total bili 3.7 high (this is up from prev of 2.1), alk phos 141 high (prev 126), ast 163 high (prev 123), alt 104 high (prev 84), hep b dna 9910 (prev 13,270), inr 1.2.  MELD 13, MELD- NA 16.  1/25/22- white blood count 5.5 normal, rbc 3.35 low, hemoglobin 11.1 low (prev 11.1), mcv 99 high, platelets 148 low (prev 140), glucose very high at 310 (please follow up with primary md regarding diabetes control), sodium 134 normal,  creatinine 0.99 normal, calcium 8.1 low (please share with primary md), albumin 2.3 low, total bilirubin 2.1 high (improved from 2.8 prev), alk phos 126 high (prev 120), ast 123 high (prev 133), alt 84 high (prev 68), hep b viral load 13,270 (prev 20,890), inr 1.3 (prev 1.1).   MELD 12, MELD-NA 15.   Scheduled for repeat egd 2/1/22 with Dr. Estes and he did that in hospital and march 2022 grade 3 [id number] and 6 bands and redo in 1m and not done yet as left and do when back.  Referred to cardiology given cardiomegaly and left ventricular dilation seen on CT chest and to see them in april as also cancelled due to insurance.  January 14 labs show INR normal at 1.1 and compared to October 18 that is actually lower than 1.2.  Sugar down to 156 from preadmission levels in the 290.  BUN of 16 creatinine 1.11 sodium 135 potassium 4.2 calcium slightly low at 8.4 but up from 7.93 the last admission when it was 7.9.  Albumin still up at 3.3 and previously had been 2.33 that admission.  Total bilirubin still elevated 2.8.  Alkaline phosphatase now normal at 120.   and ALT 68.  He to follow these labs.  Hep B down to 20,890. White blood cell count 5.7 hemoglobin 11.1 which is diminished.  Platelet count 140.  MCV 95.  Neutrophils 3.9 and lymphocytes 1.0.   One looks at your hospital labs as comparison instead of the nov labs: Jan 8: cr 1.07 and alb 2.7 and tb 3.3 and ast 66 and alt 28.   Jan 7 hg 9.7 and plat 101. Inr 1.57.  HBV [id number] then.   Sometimes as clear the hbv dna, can see a little bump in the lfts. That is called an immune flare and could be sign that you may clear and need weekly labs to watch this.  HBV noted and then led to hbv tx and we started the Vemlidy in Nov 2021.  Pt admtited from clinic Dec 28 through jan 7 approx and had 3 abd taps (prior had 2 taps pre admit). Varied 5 to 15 liters. IV alb and diuresis and lost 284 to 191.   On lasix 40mg po bid and aldactone 50mg po qd.  HBV level was checked in the hospital.  [id number] eag pos and eab neg. That is coming down from a much higher level at over 890 million.  Document Type:	MRI Abdomen w/ + w/o Contrast Document Date:	January 03, 2022 18:00 EST Document Status:	Auth (Verified) Document Title:	MRI Abdomen w/ + w/o Contrast Performed By:	Deacon Huang  Verified By:	Deacon Huang on January 04, 2022 07:47 EST Encounter info:	[id number], [facility], Inpatient, 12/28/2021 - 1/08/2022  * Final Report *  Reason For Exam Liver disease  REPORT EXAM: MRI Abdomen w/ + w/o Contrast  CLINICAL INDICATION: Liver disease.  TECHNIQUE: Multisequence, multiplanar MRI of the abdomen was performed without and with intravenous contrast. ESRC.2.7.3  CONTRAST: 20 cc of Prohance  COMPARISON: 12/10/2021.  FINDINGS:  Lower Thorax: Small to moderate-sized left pleural effusion. Nonspecific nodular signal abnormality in the mid right middle and lower lobes, suboptimally evaluated on MRI.  Liver: No fat or iron. Morphologic changes of chronic liver disease including nodular surface contour, lobar redistribution and fissural widening. No definite focal lesion.   Gallbladder/Biliary Tree: Nonspecific mural edema, may be reactive to chronic liver disease and/or third spacing of fluid.  Spleen: Enlarged measuring up to 15.6 cm. Scattered punctate foci of signal dropout on in phase compared to out of phase imaging suggestive of [place] bodies.  Pancreas: Normal.  Adrenal Glands: Normal.   Kidneys/Ureters: Normal.  Gastrointestinal: Circumferential wall thickening with mural edema involving the ascending colon, likely sequelae of portal colopathy.   Lymph Nodes: Normal.  Vessels: Recanalized paraumbilical vein with large paraesophageal, perigastric and perisplenic varices. Patent portal veins.  Peritoneum/Retroperitoneum: Moderate volume abdominopelvic ascites.  Bones/Soft Tissues: Anasarca. No aggressive osseous lesion.  IMPRESSION:     Examination again suboptimal secondary to moderate volume ascites causing dielectric effect artifact.  Morphologic changes of chronic liver disease with sequelae of portal hypertension including moderate volume ascites, splenomegaly and upper abdominal varices. No definite focal hepatic lesion.  Moderate left pleural effusion. Nonspecific signal abnormality in the right middle and lower lobes, suboptimally evaluated on MRI. This can be further evaluated with dedicated CT chest for evaluation of airspace disease as clinically warranted.   Signature Line *** Final ***  Electronically Signed By:  Deacon Huang on  01/04/2022 07:47  Dictated by:  Deacon Huang   Chest was also looked at but edema limited for the nodularity and they recommended to redo when more diuresed.   Document Type:	CT Chest w/o Contrast Document Date:	January 05, 2022 10:22 EST Document Status:	Auth (Verified) Document Title:	CT Chest w/o Contrast Performed By:	Davey Zuniga  Verified By:	Caitlyn Bledsoe on January 05, 2022 14:35 EST Encounter info:	[id number], [facility], Inpatient, 12/28/2021 - 1/08/2022  * Final Report *  Reason For Exam Mediastinal mass  REPORT EXAM: CT Chest w/o Contrast  CLINICAL INDICATION:  Mediastinal mass. MASS  TECHNIQUE: Multiple-row detector helical CT examination of the chest without IV contrast. Axial, sagittal, and coronal reconstructed images.  COMPARISON:  Chest x-ray 12/31/2021.    FINDINGS:  Support Devices:  None.    Mediastinum: Cardiomegaly with left ventricular dilation. No pericardial effusion. Slightly low-attenuation of ventricular blood pool which may reflect anemia. Thickened appearance of the mid and distal esophagus.  Lymph nodes: No pathologically enlarged mediastinal, hilar, or axillary lymph nodes.     Airways: Central airways are patent. Mucus plugging within the dependent distal basilar airways.  Lungs/Pleura: Groundglass/consolidative airspace opacities within the bilateral lung bases, left greater than right. Peripheral groundglass opacities within the right middle lobe and along the right minor fissure. Bibasilar atelectasis. No pulmonary edema. Small left pleural effusion. No pneumothorax.    Upper abdomen: Large volume abdominal ascites. Mural gallbladder wall thickening. Splenomegaly. Cirrhotic morphology of the liver. These findings are better characterized on MRI of the abdomen dated 1/4/2022. Curvilinear densities face the gastrohepatic ligament likely represent varicosities.  Bones and soft tissues:  Imaged thyroid gland is normal. No acute osseous abnormality multilevel degenerative disc disease with minimal anterior disc osteophytosis. Diffuse anasarca.. Symmetric bilateral gynecomastia    IMPRESSION:   1. Basilar predominant airspace opacities and small left pleural effusion is most consistent with resolving pulmonary edema.  2. The wall of the mid/distal esophagus appears severely thickened. These findings may reflect promt documented sequela of portal hypertension. However, it is difficult to exclude esophageal malignancy and follow-up contrast enhanced CT chest is recommended for further characterization.  3. Peripheral fissural and right middle lobe nodularity. Findings are nonspecific but for further characterization a follow-up CT chest is recommended after resolution of left pleural effusion and when the patient is able to sustain a inspirational breath-hold.  The images were reviewed and interpreted by Caitlyn Bledsoe MD.  He did the egd and banded x 5 and Dr Beauchamp her and need to redo that in 1m. has appt to do with Dr Estes. Jan 7: Large varices and 5 bands and complete erradication and mild phtn gastropathy.  Needs to be caught up.  Prior visit with Scarlett: Dec 28  Pt returns today with complaint of increased abdominal distention, sob, and increased weight.  Currently weighs 284 lbs.  Prior weight 215 (estimate) on 10/18/21, 240 (estimate) on 11/15/21, and  274 11/29/21.    MRI 12/10/21 showed large left pleural effusion, has not heard back from pcp in regards to further mgmt of this.  Admits to orthopnea requiring 3-4 pillows.  O2 sat 90% in office.  Presents today in wheelchair due to difficulty with walking secondary to excess weight, swelling in legs, abdomen and SOB.   12/10/21 MRI unfortunately was limited somewhat by your ascites problem. They mention that you had a large layering left pleural effusion with bibasilar atelectasis.  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 They mention he had a shrunken cirrhotic appearing liver there was again limited in its exam but with no obvious lesion but again was limited. The gallbladder and biliary tree showed no discrete abnormality. Spleen was enlarged at 16.5 cm. Pancreas head no gross abnormality.  Neither did the kidneys. Lymph nodes were suboptimally seen but with no gross lymphadenopathy. The portal vein appeared to be grossly patent.  You had a large volume of ascites and some anasarca in your tissues. They recommend given your fluid issues that for the next MRI to do the MRI not too long after after you have a paracentesis done.  That will/could diminish some of the artifact issues that we had this time.   Unfortunately,  not always easy to coordinate these scans as an outpatient with a tap given the limitations of how things are scheduled independently. The hope is that with your hepatitis B being on treatment that the liver labs will cool off as well as some of the factors leading to the speed of this fluid accumulation.  Unfortunately the HBV medicine do take a period of time to drop the HBV levels and inflammation from this particularly if the virus level is higher to drop to start with.  S/p paracentesis with 5L ascites removed on 11/1/21, states more fluid remained but told they could only remove 5L max.    Underwent repeat paracentesis on 12/1/21 with 13.5 L ascites removed.     IV abx completed end of Oct 2021 for osteomyelitis, cleared by ID but will be on disability for 2 more months per podiatry.     he saw sebastian dietician re the low sodium diet.  Last a1c 6.5% 11/29/21.    11/29/21 labs- vitamin b12 1099 normal , folate 3.7 normal, glucose 290 (significant elevated, please share with primary MD), creatinine 0.94 normal, monocytes absolute 1.1 high, sodium 131 low, chloride 93 low, calcium 7.9 low, albumin 2.3 (prev 2.8) low, total bili 2.7 high (prev 3.5 on 11/5/21), alk phos 148 elevated (prev 100 11/5/21), ast 57 elevated (prev 61 11/5/21), alt 26 normal (prev 23 11/5/21), hgb 9.9 low (prev 12.2 11/5/21), rbc 3.0 low, platelets 146 low (prev 163).  Please share these labs with primary MD.  Would like to repeat labs again in 2 weeks prior to next office visit on 12/28.  HBV DNA viral load down to 319 million (from prev 890 million prior to starting vemlidy)  10/18/21- TIBC slightly low at 212, iron sat normal at 33, igg 2396 elevated, igm 141, ferritin 403 elevated, hep be ag positive, hep be ab negative, asma negative (prev positive), ama negative, alpha 1 232 elevated but normal phenotype MM, glucose 249 elevated (please share with primary MD), creatinine 1.12 (prev 1.54), potassium low at 3.1 (please share with primary MD as likely secondary to lasix and they may want to adjust potassium supplment dosage or consider adding spironolactone to help with low potassium and fluid retention), albumin low at 3.0 but improved from prior of 2.3, total bili 3.3 elevated, alk phos 81 (prev 69), ast 45 elevated, alt 20, alyssa negative, wbc 6.6, hgb 10.8 low but up from prev 9.0, mcv high at 99, mch 33.8 elevated, rbc 3.2 (please share with primary md), plt 142 low (increased from 92 on 9/24/21), inr 1.2, hep b core ab positive, hep a ab total negative (recommend getting hep a vaccine as not immune), hep b surf ag positive.   RECAP Of Pre visit hx: He has a PMHx of DM2 (last a1c 7.5% on glimepiride and janumet), hx of right great toe infection 8/2021, and presented to the ED on 9/17/21 with erythema/pain/drainage from right 4th toe. He was recently diagnosed with OM of right 4th digit on 9/10/21 and is s/p 4th digit partial metatarsal resection by Dr. Mathew. Patient did not follow up with ID outpatient for antibiotics following 9/10 surgery. Patient started on Vanc/Zosyn in ED on 9/17  He underwent further surgery by Dr. Mathew on 09/19/2021 including right partial 4th metatarsal resection with wound debridement and application of wound VAC, right excisional debridement of the hallux plantar ulcer down to subcutaneous tissues on 9/19.   While admitted  he was diagnosed with hepatitis B, ultrasound of the liver was concerning for possible liver cirrhosis and ascites and recommended to follow up outpatient with GI.   Also noted to have ANGELINA that was improving.  Discharged on 9/25 with IV Zosyn x6wks at outpatient infusion center (Dr. Behari) with wound vac f/u at Dr Mathew's clinic (podiatry).    Pt notes hx of PARDO/fatty liver disease x 10-15 yrs and treated with diet and exercise and monitoring of labs q6m with pcp. Has not seen hepatology for it.  Was noted to have jaundice during admission with T bili 4.0, direct bili 0.7, indirect bili 3.3. Denies heavy etoh use. No hx of iv drug use. Previously just had labs checked with his pcp every 6 months.  No prior egd. Colon 2-3 yrs ago with no polyps  He has had chronic thrombocytopenia since 2017 (platelets in 130's) and as low as 76 on 7/29/21.  Platelets 142 on admission (9/17) and 101 (9/24)  Ascites first noted during sept hospitalization.    He believes he contracted HBV from his now ex-wife who notified him that she had been exposed to HBV after marital infidelity.  He  in July 2021.  Believes last exposure about April 2021.  Labs 9/25/2021: WBC 6.7, hemoglobin 9.0, MCV 96.7, platelets low 92, sodium 137, potassium 3.3 low, creatinine 1.54 high, calcium 7.9 low.  Labs 9/23/2021: HIV negative  Labs 9/22/2021: Hepatitis B viral  million, hepatitis B E antibody nonreactive  Labs 9/21/2021: Sodium 137 glucose 162, albumin low at 2.3, T bili 2.3 high, AST 69 high, ALT 31, alk phos 69, platelets 109 low, hemoglobin 9.7 low  Labs 9/20/2021: INR 1.39, PT 16.4, ASMA positive 1:40, hep B surface antigen positive, creatinine 1.65, alpha-1 antitrypsin 168, hepatitis A antibody IgM nonreactive, hep B core antibody IgM nonreactive, hep C antibody nonreactive, ceruloplasmin 32, AMA negative, AFP 1.1, vitamin D 11 low  Labs 9/19/2021: T bili 3.2, AST 63, ALT 32, alk phos 70, creatinine 1.75, total protein 6.0, albumin 2.3, sodium 136  Labs 9/18/2021: T bili 4.0, direct bili 0.7, indirect bili 3.3, A1c 7.5, COVID-19 negative   (9/18) GB US: FINDINGS: The pancreas is poorly visualized due to technical factors and overlying bowel gas. Liver and gallbladder are not well assessed due to poor acoustic windows. Increased liver echogenicity is suggested. Gallbladder is nonvisualized. There is a negative sonographic Cameron sign. The common bile duct measures 4 mm . The right kidney measures 12.7 cm in greatest longitudinal dimension. No right-sided hydronephrosis. There is a small to moderate amount of ascites demonstrated within the upper abdomen. Liver demonstrates a slightly nodular contour.   IMPRESSION: Summation limited by poor acoustic windows. The gallbladder was not visualized. Liver was not well assessed. Slightly nodular contour the liver suggests which may reflect underlying cirrhosis. Correlate with clinical findings. Diffuse mild increased liver echogenicity which can be seen with diffuse hepatic steatosis versus diffuse infiltration. Correlate with hepatic enzymes. Ascites.   Plan: 1. Need labs to see how doing. 2. Need to refill lasix and aldactone to prevent going up. 3. He will be there for while. 4. Addy Rowell is Dr Joseluis Zamora is my friend. 5. Pt will then do labs in 1m. 6. Telemed in 1m pending this. 7. Needs 3 egd fo rbanding when back as 2nd one in hospital march 2022 done. 8. refer to oltx team .  Stressed to pt the need for social distancing and strict handwashing and wearing a mask and to follow any other new or added CDC recommendations as this is an evolving target.  Duration of the visit was 50 minutes with 10 minutes of chart prep and 40 minutes via healow for this TeleMed visit reviewing recent records current status and future plans for the patient.

## 2022-04-29 NOTE — HPI-TODAY'S VISIT:
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   A copy of this note will be sent to the referring provider.  He says that fluid has been an issue and says he was set up 2 weeks ago for a tap and was set up for a tap and he had cancelled due to insurance.  He says weight is at 280. His prior lowest weight is 190 and he thinks he had gained water weight.  He should go to the er and be assessed for admission.  One outpt tap is not going to fix that.  No march labs back yet to us. Any lab test did them and dont have them. He says he has the email from the first one but not sure.  2/1/22- wbc 5.3 normal, rbc 3.18 low with polychromasia, macrocytes and anisocytosis (please share with primary md), hgb 10.3 low (prev 11.1 and 9.9), platelets 76 low (actual platelet count may be slightly higher than reported due to clumping, prev 148), glucose 137 high but improved (prev 310), creatinine 0.88 normal, sodium 134 normal, calcium low at 8 (please share with primary md), albumin is low at 2.9 (but improved from prev of 2.3), total bili 3.7 high (this is up from prev of 2.1), alk phos 141 high (prev 126), ast 163 high (prev 123), alt 104 high (prev 84), hep b dna 9910 (prev 13,270), inr 1.2.  MELD 13, MELD- NA 16.  1/25/22- white blood count 5.5 normal, rbc 3.35 low, hemoglobin 11.1 low (prev 11.1), mcv 99 high, platelets 148 low (prev 140), glucose very high at 310 (please follow up with primary md regarding diabetes control), sodium 134 normal,  creatinine 0.99 normal, calcium 8.1 low (please share with primary md), albumin 2.3 low, total bilirubin 2.1 high (improved from 2.8 prev), alk phos 126 high (prev 120), ast 123 high (prev 133), alt 84 high (prev 68), hep b viral load 13,270 (prev 20,890), inr 1.3 (prev 1.1).   MELD 12, MELD-NA 15.  At visit with Scarlett weight was 205 today.    Not eating well and drinking more water.  Lasix 40 mg bid/aldactone 50 mg/day.    re the dm he has an appt to see someone for this next month for endocrine and sees primary soon.  Scheduled for repeat egd 2/1/22 with Dr. Estes and he says he did not due to insurance.  Referred to cardiology given cardiomegaly and left ventricular dilation seen on CT chest and to see them in april as also cancelled due to insurance.  Also due for repeat MRI due April 2022.  Ct chest until fluid need to be optimized.  January 14 labs show INR normal at 1.1 and compared to October 18 that is actually lower than 1.2.  Sugar down to 156 from preadmission levels in the 290.  BUN of 16 creatinine 1.11 sodium 135 potassium 4.2 calcium slightly low at 8.4 but up from 7.93 the last admission when it was 7.9.  Albumin still up at 3.3 and previously had been 2.33 that admission.  Total bilirubin still elevated 2.8.  Alkaline phosphatase now normal at 120.   and ALT 68.  He to follow these labs.  Hep B down to 20,890. White blood cell count 5.7 hemoglobin 11.1 which is diminished.  Platelet count 140.  MCV 95.  Neutrophils 3.9 and lymphocytes 1.0.   One looks at your hospital labs as comparison instead of the nov labs: Jan 8: cr 1.07 and alb 2.7 and tb 3.3 and ast 66 and alt 28.   Jan 7 hg 9.7 and plat 101. Inr 1.57.  HBV [id number] then.   Sometimes as clear the hbv dna, can see a little bump in the lfts. That is called an immune flare and could be sign that you may clear and need weekly labs to watch this.  HBV noted and then led to hbv tx and we started the Vemlidy in Nov 2021.  Pt admtited from clinic Dec 28 through jan 7 approx and had 3 abd taps (prior had 2 taps pre admit). Varied 5 to 15 liters. IV alb and diuresis and lost 284 to 191.   On lasix 40mg po bid and aldactone 50mg po qd.  HBV level was checked in the hospital.  [id number] eag pos and eab neg. That is coming down from a much higher level at over 890 million.  Document Type:	MRI Abdomen w/ + w/o Contrast Document Date:	January 03, 2022 18:00 EST Document Status:	Auth (Verified) Document Title:	MRI Abdomen w/ + w/o Contrast Performed By:	Deacon Huang  Verified By:	Deacon Huang on January 04, 2022 07:47 EST Encounter info:	[id number], [facility], Inpatient, 12/28/2021 - 1/08/2022  * Final Report *  Reason For Exam Liver disease  REPORT EXAM: MRI Abdomen w/ + w/o Contrast  CLINICAL INDICATION: Liver disease.  TECHNIQUE: Multisequence, multiplanar MRI of the abdomen was performed without and with intravenous contrast. [id number]  CONTRAST: 20 cc of Prohance  COMPARISON: 12/10/2021.  FINDINGS:  Lower Thorax: Small to moderate-sized left pleural effusion. Nonspecific nodular signal abnormality in the mid right middle and lower lobes, suboptimally evaluated on MRI.  Liver: No fat or iron. Morphologic changes of chronic liver disease including nodular surface contour, lobar redistribution and fissural widening. No definite focal lesion.   Gallbladder/Biliary Tree: Nonspecific mural edema, may be reactive to chronic liver disease and/or third spacing of fluid.  Spleen: Enlarged measuring up to 15.6 cm. Scattered punctate foci of signal dropout on in phase compared to out of phase imaging suggestive of [place] bodies.  Pancreas: Normal.  Adrenal Glands: Normal.   Kidneys/Ureters: Normal.  Gastrointestinal: Circumferential wall thickening with mural edema involving the ascending colon, likely sequelae of portal colopathy.   Lymph Nodes: Normal.  Vessels: Recanalized paraumbilical vein with large paraesophageal, perigastric and perisplenic varices. Patent portal veins.  Peritoneum/Retroperitoneum: Moderate volume abdominopelvic ascites.  Bones/Soft Tissues: Anasarca. No aggressive osseous lesion.  IMPRESSION:     Examination again suboptimal secondary to moderate volume ascites causing dielectric effect artifact.  Morphologic changes of chronic liver disease with sequelae of portal hypertension including moderate volume ascites, splenomegaly and upper abdominal varices. No definite focal hepatic lesion.  Moderate left pleural effusion. Nonspecific signal abnormality in the right middle and lower lobes, suboptimally evaluated on MRI. This can be further evaluated with dedicated CT chest for evaluation of airspace disease as clinically warranted.   Signature Line *** Final ***  Electronically Signed By:  Deacon Huang on  01/04/2022 07:47  Dictated by:  Deacon Huang   Chest was also looked at but edema limited for the nodularity and they recommended to redo when more diuresed.   Document Type:	CT Chest w/o Contrast Document Date:	January 05, 2022 10:22 EST Document Status:	Auth (Verified) Document Title:	CT Chest w/o Contrast Performed By:	Davey Zuniga  Verified By:	Caitlyn Bledsoe on January 05, 2022 14:35 EST Encounter info:	[id number], [facility], Inpatient, 12/28/2021 - 1/08/2022  * Final Report *  Reason For Exam Mediastinal mass  REPORT EXAM: CT Chest w/o Contrast  CLINICAL INDICATION:  Mediastinal mass. MASS  TECHNIQUE: Multiple-row detector helical CT examination of the chest without IV contrast. Axial, sagittal, and coronal reconstructed images.  COMPARISON:  Chest x-ray 12/31/2021.    FINDINGS:  Support Devices:  None.    Mediastinum: Cardiomegaly with left ventricular dilation. No pericardial effusion. Slightly low-attenuation of ventricular blood pool which may reflect anemia. Thickened appearance of the mid and distal esophagus.  Lymph nodes: No pathologically enlarged mediastinal, hilar, or axillary lymph nodes.     Airways: Central airways are patent. Mucus plugging within the dependent distal basilar airways.  Lungs/Pleura: Groundglass/consolidative airspace opacities within the bilateral lung bases, left greater than right. Peripheral groundglass opacities within the right middle lobe and along the right minor fissure. Bibasilar atelectasis. No pulmonary edema. Small left pleural effusion. No pneumothorax.    Upper abdomen: Large volume abdominal ascites. Mural gallbladder wall thickening. Splenomegaly. Cirrhotic morphology of the liver. These findings are better characterized on MRI of the abdomen dated 1/4/2022. Curvilinear densities face the gastrohepatic ligament likely represent varicosities.  Bones and soft tissues:  Imaged thyroid gland is normal. No acute osseous abnormality multilevel degenerative disc disease with minimal anterior disc osteophytosis. Diffuse anasarca.. Symmetric bilateral gynecomastia    IMPRESSION:   1. Basilar predominant airspace opacities and small left pleural effusion is most consistent with resolving pulmonary edema.  2. The wall of the mid/distal esophagus appears severely thickened. These findings may reflect promt documented sequela of portal hypertension. However, it is difficult to exclude esophageal malignancy and follow-up contrast enhanced CT chest is recommended for further characterization.  3. Peripheral fissural and right middle lobe nodularity. Findings are nonspecific but for further characterization a follow-up CT chest is recommended after resolution of left pleural effusion and when the patient is able to sustain a inspirational breath-hold.  The images were reviewed and interpreted by Caitlyn Bledsoe MD.  He did the egd and banded x 5 and Dr Beauchamp her and need to redo that in 1m. has appt to do with Dr Estes. Jan 7: Large varices and 5 bands and complete erradication and mild phtn gastropathy.  Needs to be caught up.  Prior visit with Scarlett: Dec 28  Pt returns today with complaint of increased abdominal distention, sob, and increased weight.  Currently weighs 284 lbs.  Prior weight 215 (estimate) on 10/18/21, 240 (estimate) on 11/15/21, and  274 11/29/21.    MRI 12/10/21 showed large left pleural effusion, has not heard back from pcp in regards to further mgmt of this.  Admits to orthopnea requiring 3-4 pillows.  O2 sat 90% in office.  Presents today in wheelchair due to difficulty with walking secondary to excess weight, swelling in legs, abdomen and SOB.   12/10/21 MRI unfortunately was limited somewhat by your ascites problem. They mention that you had a large layering left pleural effusion with bibasilar atelectasis.  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 They mention he had a shrunken cirrhotic appearing liver there was again limited in its exam but with no obvious lesion but again was limited. The gallbladder and biliary tree showed no discrete abnormality. Spleen was enlarged at 16.5 cm. Pancreas head no gross abnormality.  Neither did the kidneys. Lymph nodes were suboptimally seen but with no gross lymphadenopathy. The portal vein appeared to be grossly patent.  You had a large volume of ascites and some anasarca in your tissues. They recommend given your fluid issues that for the next MRI to do the MRI not too long after after you have a paracentesis done.  That will/could diminish some of the artifact issues that we had this time.   Unfortunately,  not always easy to coordinate these scans as an outpatient with a tap given the limitations of how things are scheduled independently. The hope is that with your hepatitis B being on treatment that the liver labs will cool off as well as some of the factors leading to the speed of this fluid accumulation.  Unfortunately the HBV medicine do take a period of time to drop the HBV levels and inflammation from this particularly if the virus level is higher to drop to start with.  S/p paracentesis with 5L ascites removed on 11/1/21, states more fluid remained but told they could only remove 5L max.    Underwent repeat paracentesis on 12/1/21 with 13.5 L ascites removed.     IV abx completed end of Oct 2021 for osteomyelitis, cleared by ID but will be on disability for 2 more months per podiatry.     he saw [facility]ae dietician re the low sodium diet.  Last a1c 6.5% 11/29/21.    11/29/21 labs- vitamin b12 1099 normal , folate 3.7 normal, glucose 290 (significant elevated, please share with primary MD), creatinine 0.94 normal, monocytes absolute 1.1 high, sodium 131 low, chloride 93 low, calcium 7.9 low, albumin 2.3 (prev 2.8) low, total bili 2.7 high (prev 3.5 on 11/5/21), alk phos 148 elevated (prev 100 11/5/21), ast 57 elevated (prev 61 11/5/21), alt 26 normal (prev 23 11/5/21), hgb 9.9 low (prev 12.2 11/5/21), rbc 3.0 low, platelets 146 low (prev 163).  Please share these labs with primary MD.  Would like to repeat labs again in 2 weeks prior to next office visit on 12/28.  HBV DNA viral load down to 319 million (from prev 890 million prior to starting vemlidy)  10/18/21- TIBC slightly low at 212, iron sat normal at 33, igg 2396 elevated, igm 141, ferritin 403 elevated, hep be ag positive, hep be ab negative, asma negative (prev positive), ama negative, alpha 1 232 elevated but normal phenotype MM, glucose 249 elevated (please share with primary MD), creatinine 1.12 (prev 1.54), potassium low at 3.1 (please share with primary MD as likely secondary to lasix and they may want to adjust potassium supplment dosage or consider adding spironolactone to help with low potassium and fluid retention), albumin low at 3.0 but improved from prior of 2.3, total bili 3.3 elevated, alk phos 81 (prev 69), ast 45 elevated, alt 20, alyssa negative, wbc 6.6, hgb 10.8 low but up from prev 9.0, mcv high at 99, mch 33.8 elevated, rbc 3.2 (please share with primary md), plt 142 low (increased from 92 on 9/24/21), inr 1.2, hep b core ab positive, hep a ab total negative (recommend getting hep a vaccine as not immune), hep b surf ag positive.   RECAP Of Pre visit hx: He has a PMHx of DM2 (last a1c 7.5% on glimepiride and janumet), hx of right great toe infection 8/2021, and presented to the ED on 9/17/21 with erythema/pain/drainage from right 4th toe. He was recently diagnosed with OM of right 4th digit on 9/10/21 and is s/p 4th digit partial metatarsal resection by Dr. Mathew. Patient did not follow up with ID outpatient for antibiotics following 9/10 surgery. Patient started on Vanc/Zosyn in ED on 9/17  He underwent further surgery by Dr. Mathew on 09/19/2021 including right partial 4th metatarsal resection with wound debridement and application of wound VAC, right excisional debridement of the hallux plantar ulcer down to subcutaneous tissues on 9/19.   While admitted  he was diagnosed with hepatitis B, ultrasound of the liver was concerning for possible liver cirrhosis and ascites and recommended to follow up outpatient with GI.   Also noted to have ANGELINA that was improving.  Discharged on 9/25 with IV Zosyn x6wks at outpatient infusion center (Dr. Behari) with wound vac f/u at Dr Mathew's clinic (podiatry).    Pt notes hx of PARDO/fatty liver disease x 10-15 yrs and treated with diet and exercise and monitoring of labs q6m with pcp. Has not seen hepatology for it.  Was noted to have jaundice during admission with T bili 4.0, direct bili 0.7, indirect bili 3.3. Denies heavy etoh use. No hx of iv drug use. Previously just had labs checked with his pcp every 6 months.  No prior egd. Colon 2-3 yrs ago with no polyps  He has had chronic thrombocytopenia since 2017 (platelets in 130's) and as low as 76 on 7/29/21.  Platelets 142 on admission (9/17) and 101 (9/24)  Ascites first noted during sept hospitalization.    He believes he contracted HBV from his now ex-wife who notified him that she had been exposed to HBV after marital infidelity.  He  in July 2021.  Believes last exposure about April 2021.  Labs 9/25/2021: WBC 6.7, hemoglobin 9.0, MCV 96.7, platelets low 92, sodium 137, potassium 3.3 low, creatinine 1.54 high, calcium 7.9 low.  Labs 9/23/2021: HIV negative  Labs 9/22/2021: Hepatitis B viral  million, hepatitis B E antibody nonreactive  Labs 9/21/2021: Sodium 137 glucose 162, albumin low at 2.3, T bili 2.3 high, AST 69 high, ALT 31, alk phos 69, platelets 109 low, hemoglobin 9.7 low  Labs 9/20/2021: INR 1.39, PT 16.4, ASMA positive 1:40, hep B surface antigen positive, creatinine 1.65, alpha-1 antitrypsin 168, hepatitis A antibody IgM nonreactive, hep B core antibody IgM nonreactive, hep C antibody nonreactive, ceruloplasmin 32, AMA negative, AFP 1.1, vitamin D 11 low  Labs 9/19/2021: T bili 3.2, AST 63, ALT 32, alk phos 70, creatinine 1.75, total protein 6.0, albumin 2.3, sodium 136  Labs 9/18/2021: T bili 4.0, direct bili 0.7, indirect bili 3.3, A1c 7.5, COVID-19 negative   (9/18) GB US: FINDINGS: The pancreas is poorly visualized due to technical factors and overlying bowel gas. Liver and gallbladder are not well assessed due to poor acoustic windows. Increased liver echogenicity is suggested. Gallbladder is nonvisualized. There is a negative sonographic Cameron sign. The common bile duct measures 4 mm . The right kidney measures 12.7 cm in greatest longitudinal dimension. No right-sided hydronephrosis. There is a small to moderate amount of ascites demonstrated within the upper abdomen. Liver demonstrates a slightly nodular contour.   IMPRESSION: Summation limited by poor acoustic windows. The gallbladder was not visualized. Liver was not well assessed. Slightly nodular contour the liver suggests which may reflect underlying cirrhosis. Correlate with clinical findings. Diffuse mild increased liver echogenicity which can be seen with diffuse hepatic steatosis versus diffuse infiltration. Correlate with hepatic enzymes. Ascites.   Plan: 1. Need to come to er to be assessed and given 100 pounds weight gain he marcus be admitted. 2, Need a tap and iv alb and need renal team to see pending cr. 3. Need to check mri. 4. Need to get the egd and banding. 5. Suspect one outpt tap not to fix this. 6. Pt had lost insurance and he lapsed on this.  Stressed to pt the need for social distancing and strict handwashing and wearing a mask and to follow any other new or added CDC recommendations as this is an evolving target.  Duration of the visit was  minutes with 5 minutes of chart prep and 37 minutes via dox video for this TeleMed visit reviewing recent records current status and future plans for the patient.

## 2022-05-04 ENCOUNTER — LAB OUTSIDE AN ENCOUNTER (OUTPATIENT)
Dept: URBAN - METROPOLITAN AREA CLINIC 86 | Facility: CLINIC | Age: 52
End: 2022-05-04

## 2022-05-04 ENCOUNTER — TELEPHONE ENCOUNTER (OUTPATIENT)
Dept: URBAN - METROPOLITAN AREA CLINIC 86 | Facility: CLINIC | Age: 52
End: 2022-05-04

## 2022-05-04 PROBLEM — 103611000119102: Status: ACTIVE | Noted: 2021-10-18

## 2022-05-09 ENCOUNTER — TELEPHONE ENCOUNTER (OUTPATIENT)
Dept: URBAN - METROPOLITAN AREA CLINIC 92 | Facility: CLINIC | Age: 52
End: 2022-05-09

## 2022-05-09 NOTE — HPI-TODAY'S VISIT:
Dear Garo Suarez, May 5 [facility] labs sent in from [place]. Glucose 73 BUN 21 creatinine 0.84 sodium 134 which is slightly low potassium 4.1 chloride 101 CO2 of 28 calcium low at 8.2 albumin low at 3.0 bilirubin elevated 2.7 alkaline phosphatase 70 AST 82 ALT 54 INR 1.3.  White cell count 4.6 hemoglobin 10.2 which is low.  Platelet count 129 which is low.  MCV 95.5.  Neutrophils normal at 2608 and lymphocytes 1003. Prior march labs: na 138 and cr 0.8 and alb 3.8 and tb 3.5 and ast 48 and alt 17 and alk 45 and hg 10.1. Meld 13 and meld na16. You need to see oltx team either here or there where you are at as suspect TIPS needed for this fluid issue and needs to be done as part of an liver transplant plan. He is still in [place] and he is planning to do Tap next week here at [place]. Says being worked up to see for oltx team.  Dr Nolasco

## 2022-05-27 ENCOUNTER — LAB OUTSIDE AN ENCOUNTER (OUTPATIENT)
Dept: URBAN - METROPOLITAN AREA TELEHEALTH 2 | Facility: TELEHEALTH | Age: 52
End: 2022-05-27

## 2022-06-08 ENCOUNTER — OFFICE VISIT (OUTPATIENT)
Dept: URBAN - METROPOLITAN AREA TELEHEALTH 2 | Facility: TELEHEALTH | Age: 52
End: 2022-06-08

## 2022-06-08 ENCOUNTER — TELEPHONE ENCOUNTER (OUTPATIENT)
Dept: URBAN - METROPOLITAN AREA CLINIC 92 | Facility: CLINIC | Age: 52
End: 2022-06-08

## 2022-06-08 RX ORDER — FOLIC ACID 1 MG/1
1 TABLET TABLET ORAL ONCE A DAY
Qty: 30 | Refills: 1 | Status: ACTIVE | COMMUNITY

## 2022-06-08 RX ORDER — SPIRONOLACTONE 50 MG/1
1 TABLET TABLET, FILM COATED ORAL
Qty: 60 | Refills: 1

## 2022-06-08 RX ORDER — SAXAGLIPTIN 5 MG/1
1 TABLET TABLET, FILM COATED ORAL ONCE A DAY
Status: ACTIVE | COMMUNITY

## 2022-06-08 RX ORDER — FUROSEMIDE 40 MG/1
1 TABLET TABLET ORAL TWICE A DAY
Qty: 60 | Refills: 1 | Status: ACTIVE | COMMUNITY

## 2022-06-08 RX ORDER — FOLIC ACID 1 MG/1
1 TABLET TABLET ORAL ONCE A DAY
Qty: 30 | Refills: 1

## 2022-06-08 RX ORDER — FUROSEMIDE 40 MG/1
1 TABLET TABLET ORAL TWICE A DAY
Qty: 60 | Refills: 1

## 2022-06-08 RX ORDER — CIPROFLOXACIN 500 MG/1
1 TABLET TABLET, FILM COATED ORAL
Status: ACTIVE | COMMUNITY

## 2022-06-08 RX ORDER — TENOFOVIR ALAFENAMIDE 25 MG/1
1 TABLET WITH FOOD TABLET ORAL ONCE A DAY
Qty: 30 | Refills: 2 | Status: ACTIVE | COMMUNITY
End: 2022-07-12

## 2022-06-08 RX ORDER — LACTULOSE 10 G/10G
1 PACKET SOLUTION ORAL ONCE A DAY
Status: ACTIVE | COMMUNITY

## 2022-06-08 RX ORDER — GLIMEPIRIDE 4 MG/1
1 TABLET WITH BREAKFAST OR THE FIRST MAIN MEAL OF THE DAY TABLET ORAL ONCE A DAY
Status: ACTIVE | COMMUNITY

## 2022-06-08 RX ORDER — SPIRONOLACTONE 50 MG/1
1 TABLET TABLET, FILM COATED ORAL
Qty: 60 | Refills: 1 | Status: ACTIVE | COMMUNITY

## 2022-06-08 NOTE — HPI-TODAY'S VISIT:
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  A copy of this note will be sent to the referring provider.  Dear Garo Suarez, May 5 [facility] labs sent in from [place]. Glucose 73 BUN 21 creatinine 0.84 sodium 134 which is slightly low potassium 4.1 chloride 101 CO2 of 28 calcium low at 8.2 albumin low at 3.0 bilirubin elevated 2.7 alkaline phosphatase 70 AST 82 ALT 54 INR 1.3.  White cell count 4.6 hemoglobin 10.2 which is low.  Platelet count 129 which is low.  MCV 95.5.  Neutrophils normal at 2608 and lymphocytes 1003. Prior march labs: na 138 and cr 0.8 and alb 3.8 and tb 3.5 and ast 48 and alt 17 and alk 45 and hg 10.1. Meld 13 and meld na16. You need to see oltx team either here or there where you are at as suspect TIPS needed for this fluid issue and needs to be done as part of an liver transplant plan. He is still in [place] and he is planning to do Tap next week here at [place]. Says being worked up to see for oltx team.  Dr Nolasco    Pt says 207 and his lowest 195 and he thinks he may be doing to eating.  March 3 taps and lost to 195-197 range of weight.  He has some feet swell. He is in [place] but mother is ill.  As for abd he has some.  Lasix 40mg po bid. Aldactone 50 mg po bid.  Just ran out of aldactone. He has been off that a week.  Prior visit he was to 280 and had delayed coming for the ascites eval due to insurance issues. He came in and was admitted post that and stayed for little over a week.  March 31 2022 labs when left na 138 and k 3.8 and cl99 and co2 29 and glu 190 and cr 0.88 and alb 3.8 and tv 3.5 and tb 3.5,ast 48 and alt 17 and alk 45 and wbc 3.8 hg 10.1.  March 23 tap less than 1.5 alb  and alb less than 3 and nuc 89 and neutrophils 3%   Document Type:	MRI Abdomen w/ + w/o Contrast Document Date:	April 01, 2022 19:51 EDT Document Status:	Auth (Verified) Document Title:	MRI Abdomen w/ + w/o Contrast Performed By:	Jose Juan Omalley  Verified By:	Jojo Salcedo on April 03, 2022 10:32 EDT Encounter info:	[id number], [facility], Inpatient, 3/23/2022 - 4/02/2022  * Final Report *  Reason For Exam Liver disease; hcc screen;Liver disease  REPORT EXAM: MRI Abdomen w/ + w/o Contrast  CLINICAL INDICATION: History of hepatitis B cirrhosis with ascites. And esophageal varices status post endoscopic banding on 3/25/2022. Evaluate for HCC.  TECHNIQUE: Multisequence, multiplanar MRI of the abdomen was performed without and with intravenous contrast. ESRC.[id number]  CONTRAST: 17cc cc of Prohance  COMPARISON: MRI abdomen 1/3/2022  FINDINGS:  Lower Thorax: Small left pleural effusion and left lower lobe atelectasis similar to prior.  Liver: No fat or iron. Cirrhotic liver morphology similar to prior. No lesions.  Gallbladder/Biliary Tree: Mild diffuse gallbladder wall thickening related to third spacing/underlying liver disease. No stones. No biliary ductal dilatation.  Spleen: Unchanged splenomegaly measuring up to 16.5 cm in craniocaudal length. Small curvilinear T2 hypointensity along the lateral subcapsular region compatible with small remote intracapsular bleed.  Pancreas: Normal.  Adrenal Glands: Normal.   Kidneys/Ureters: Normal.  Gastrointestinal: Normal.   Lymph Nodes: No pathologically enlarged lymph nodes.  Vessels: Unchanged large paraesophageal varices. Additionally there are perigastric, perisplenic, and peritoneal/retroperitoneal varices are similar to prior exam. Recanalized paraumbilical vein. No aortic aneurysm. Patent portal vein, splenic vein, hepatic veins, and SMV.  Peritoneum/Retroperitoneum: Large volume ascites similar to prior. Unchanged smooth diffuse peritoneal enhancement without focal nodularity, similar to prior.  Bones/Soft Tissues: Expected marrow signal. No worrisome osseous lesions.  IMPRESSION:     1.  Cirrhotic liver morphology without evidence of hepatocellular carcinoma. 2.  Sequela of portal hypertension, including large volume ascites, splenomegaly, and upper abdominal varices. 3.  Unchanged diffuse mild peritoneal enhancement which could be from chronic peritoneal irritation from multiple prior paracenteses. However, if there is concern for peritoneal infection, recommend fluid sampling.  The images were reviewed and interpreted by Jojo Salcedo MD.  Signature Line *** Final ***  Electronically Signed By:  Jojo Salcedo on  04/03/2022 10:32  Dictated by:  Jose Juan Omalley   2/1/22- wbc 5.3 normal, rbc 3.18 low with polychromasia, macrocytes and anisocytosis (please share with primary md), hgb 10.3 low (prev 11.1 and 9.9), platelets 76 low (actual platelet count may be slightly higher than reported due to clumping, prev 148), glucose 137 high but improved (prev 310), creatinine 0.88 normal, sodium 134 normal, calcium low at 8 (please share with primary md), albumin is low at 2.9 (but improved from prev of 2.3), total bili 3.7 high (this is up from prev of 2.1), alk phos 141 high (prev 126), ast 163 high (prev 123), alt 104 high (prev 84), hep b dna 9910 (prev 13,270), inr 1.2.  MELD 13, MELD- NA 16.  1/25/22- white blood count 5.5 normal, rbc 3.35 low, hemoglobin 11.1 low (prev 11.1), mcv 99 high, platelets 148 low (prev 140), glucose very high at 310 (please follow up with primary md regarding diabetes control), sodium 134 normal,  creatinine 0.99 normal, calcium 8.1 low (please share with primary md), albumin 2.3 low, total bilirubin 2.1 high (improved from 2.8 prev), alk phos 126 high (prev 120), ast 123 high (prev 133), alt 84 high (prev 68), hep b viral load 13,270 (prev 20,890), inr 1.3 (prev 1.1).   MELD 12, MELD-NA 15.   Scheduled for repeat egd 2/1/22 with Dr. Estes and he did that in hospital and march 2022 grade 3 [id number] and 6 bands and redo in 1m and not done yet as left and do when back.  Referred to cardiology given cardiomegaly and left ventricular dilation seen on CT chest and to see them in april as also cancelled due to insurance.  January 14 labs show INR normal at 1.1 and compared to October 18 that is actually lower than 1.2.  Sugar down to 156 from preadmission levels in the 290.  BUN of 16 creatinine 1.11 sodium 135 potassium 4.2 calcium slightly low at 8.4 but up from 7.93 the last admission when it was 7.9.  Albumin still up at 3.3 and previously had been 2.33 that admission.  Total bilirubin still elevated 2.8.  Alkaline phosphatase now normal at 120.   and ALT 68.  He to follow these labs.  Hep B down to 20,890. White blood cell count 5.7 hemoglobin 11.1 which is diminished.  Platelet count 140.  MCV 95.  Neutrophils 3.9 and lymphocytes 1.0.   One looks at your hospital labs as comparison instead of the nov labs: Jan 8: cr 1.07 and alb 2.7 and tb 3.3 and ast 66 and alt 28.   Jan 7 hg 9.7 and plat 101. Inr 1.57.  HBV 212540 then.   Sometimes as clear the hbv dna, can see a little bump in the lfts. That is called an immune flare and could be sign that you may clear and need weekly labs to watch this.  HBV noted and then led to hbv tx and we started the Vemlidy in Nov 2021.  Pt admtited from clinic Dec 28 through jan 7 approx and had 3 abd taps (prior had 2 taps pre admit). Varied 5 to 15 liters. IV alb and diuresis and lost 284 to 191.   On lasix 40mg po bid and aldactone 50mg po qd.  HBV level was checked in the hospital.  [id number] eag pos and eab neg. That is coming down from a much higher level at over 890 million.  Document Type:	MRI Abdomen w/ + w/o Contrast Document Date:	January 03, 2022 18:00 EST Document Status:	Auth (Verified) Document Title:	MRI Abdomen w/ + w/o Contrast Performed By:	Deacon Huang  Verified By:	Deacon Huang on January 04, 2022 07:47 EST Encounter info:	[id number], [facility], Inpatient, 12/28/2021 - 1/08/2022  * Final Report *  Reason For Exam Liver disease  REPORT EXAM: MRI Abdomen w/ + w/o Contrast  CLINICAL INDICATION: Liver disease.  TECHNIQUE: Multisequence, multiplanar MRI of the abdomen was performed without and with intravenous contrast. ESRC.2.7.3  CONTRAST: 20 cc of Prohance  COMPARISON: 12/10/2021.  FINDINGS:  Lower Thorax: Small to moderate-sized left pleural effusion. Nonspecific nodular signal abnormality in the mid right middle and lower lobes, suboptimally evaluated on MRI.  Liver: No fat or iron. Morphologic changes of chronic liver disease including nodular surface contour, lobar redistribution and fissural widening. No definite focal lesion.   Gallbladder/Biliary Tree: Nonspecific mural edema, may be reactive to chronic liver disease and/or third spacing of fluid.  Spleen: Enlarged measuring up to 15.6 cm. Scattered punctate foci of signal dropout on in phase compared to out of phase imaging suggestive of Gamna-Lucia bodies.  Pancreas: Normal.  Adrenal Glands: Normal.   Kidneys/Ureters: Normal.  Gastrointestinal: Circumferential wall thickening with mural edema involving the ascending colon, likely sequelae of portal colopathy.   Lymph Nodes: Normal.  Vessels: Recanalized paraumbilical vein with large paraesophageal, perigastric and perisplenic varices. Patent portal veins.  Peritoneum/Retroperitoneum: Moderate volume abdominopelvic ascites.  Bones/Soft Tissues: Anasarca. No aggressive osseous lesion.  IMPRESSION:     Examination again suboptimal secondary to moderate volume ascites causing dielectric effect artifact.  Morphologic changes of chronic liver disease with sequelae of portal hypertension including moderate volume ascites, splenomegaly and upper abdominal varices. No definite focal hepatic lesion.  Moderate left pleural effusion. Nonspecific signal abnormality in the right middle and lower lobes, suboptimally evaluated on MRI. This can be further evaluated with dedicated CT chest for evaluation of airspace disease as clinically warranted.   Signature Line *** Final ***  Electronically Signed By:  Deacon Huang on  01/04/2022 07:47  Dictated by:  Deacon Huang   Chest was also looked at but edema limited for the nodularity and they recommended to redo when more diuresed.   Document Type:	CT Chest w/o Contrast Document Date:	January 05, 2022 10:22 EST Document Status:	Auth (Verified) Document Title:	CT Chest w/o Contrast Performed By:	Davey Zuniga  Verified By:	Caitlyn Bledsoe on January 05, 2022 14:35 EST Encounter info:	[id number], [facility], Inpatient, 12/28/2021 - 1/08/2022  * Final Report *  Reason For Exam Mediastinal mass  REPORT EXAM: CT Chest w/o Contrast  CLINICAL INDICATION:  Mediastinal mass. MASS  TECHNIQUE: Multiple-row detector helical CT examination of the chest without IV contrast. Axial, sagittal, and coronal reconstructed images.  COMPARISON:  Chest x-ray 12/31/2021.    FINDINGS:  Support Devices:  None.    Mediastinum: Cardiomegaly with left ventricular dilation. No pericardial effusion. Slightly low-attenuation of ventricular blood pool which may reflect anemia. Thickened appearance of the mid and distal esophagus.  Lymph nodes: No pathologically enlarged mediastinal, hilar, or axillary lymph nodes.     Airways: Central airways are patent. Mucus plugging within the dependent distal basilar airways.  Lungs/Pleura: Groundglass/consolidative airspace opacities within the bilateral lung bases, left greater than right. Peripheral groundglass opacities within the right middle lobe and along the right minor fissure. Bibasilar atelectasis. No pulmonary edema. Small left pleural effusion. No pneumothorax.    Upper abdomen: Large volume abdominal ascites. Mural gallbladder wall thickening. Splenomegaly. Cirrhotic morphology of the liver. These findings are better characterized on MRI of the abdomen dated 1/4/2022. Curvilinear densities face the gastrohepatic ligament likely represent varicosities.  Bones and soft tissues:  Imaged thyroid gland is normal. No acute osseous abnormality multilevel degenerative disc disease with minimal anterior disc osteophytosis. Diffuse anasarca.. Symmetric bilateral gynecomastia    IMPRESSION:   1. Basilar predominant airspace opacities and small left pleural effusion is most consistent with resolving pulmonary edema.  2. The wall of the mid/distal esophagus appears severely thickened. These findings may reflect promt documented sequela of portal hypertension. However, it is difficult to exclude esophageal malignancy and follow-up contrast enhanced CT chest is recommended for further characterization.  3. Peripheral fissural and right middle lobe nodularity. Findings are nonspecific but for further characterization a follow-up CT chest is recommended after resolution of left pleural effusion and when the patient is able to sustain a inspirational breath-hold.  The images were reviewed and interpreted by Caitlyn Bledsoe MD.  He did the egd and banded x 5 and Dr Beauchamp her and need to redo that in 1m. has appt to do with Dr Estes. Jan 7: Large varices and 5 bands and complete erradication and mild phtn gastropathy.  Needs to be caught up.  Prior visit with [facility]lett: Dec 28  Pt returns today with complaint of increased abdominal distention, sob, and increased weight.  Currently weighs 284 lbs.  Prior weight 215 (estimate) on 10/18/21, 240 (estimate) on 11/15/21, and  274 11/29/21.    MRI 12/10/21 showed large left pleural effusion, has not heard back from pcp in regards to further mgmt of this.  Admits to orthopnea requiring 3-4 pillows.  O2 sat 90% in office.  Presents today in wheelchair due to difficulty with walking secondary to excess weight, swelling in legs, abdomen and SOB.   12/10/21 MRI unfortunately was limited somewhat by your ascites problem. They mention that you had a large layering left pleural effusion with bibasilar atelectasis.  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 They mention he had a shrunken cirrhotic appearing liver there was again limited in its exam but with no obvious lesion but again was limited. The gallbladder and biliary tree showed no discrete abnormality. Spleen was enlarged at 16.5 cm. Pancreas head no gross abnormality.  Neither did the kidneys. Lymph nodes were suboptimally seen but with no gross lymphadenopathy. The portal vein appeared to be grossly patent.  You had a large volume of ascites and some anasarca in your tissues. They recommend given your fluid issues that for the next MRI to do the MRI not too long after after you have a paracentesis done.  That will/could diminish some of the artifact issues that we had this time.   Unfortunately,  not always easy to coordinate these scans as an outpatient with a tap given the limitations of how things are scheduled independently. The hope is that with your hepatitis B being on treatment that the liver labs will cool off as well as some of the factors leading to the speed of this fluid accumulation.  Unfortunately the HBV medicine do take a period of time to drop the HBV levels and inflammation from this particularly if the virus level is higher to drop to start with.  S/p paracentesis with 5L ascites removed on 11/1/21, states more fluid remained but told they could only remove 5L max.    Underwent repeat paracentesis on 12/1/21 with 13.5 L ascites removed.     IV abx completed end of Oct 2021 for osteomyelitis, cleared by ID but will be on disability for 2 more months per podiatry.     he saw sebastian dietician re the low sodium diet.  Last a1c 6.5% 11/29/21.    11/29/21 labs- vitamin b12 1099 normal , folate 3.7 normal, glucose 290 (significant elevated, please share with primary MD), creatinine 0.94 normal, monocytes absolute 1.1 high, sodium 131 low, chloride 93 low, calcium 7.9 low, albumin 2.3 (prev 2.8) low, total bili 2.7 high (prev 3.5 on 11/5/21), alk phos 148 elevated (prev 100 11/5/21), ast 57 elevated (prev 61 11/5/21), alt 26 normal (prev 23 11/5/21), hgb 9.9 low (prev 12.2 11/5/21), rbc 3.0 low, platelets 146 low (prev 163).  Please share these labs with primary MD.  Would like to repeat labs again in 2 weeks prior to next office visit on 12/28.  HBV DNA viral load down to 319 million (from prev 890 million prior to starting vemlidy)  10/18/21- TIBC slightly low at 212, iron sat normal at 33, igg 2396 elevated, igm 141, ferritin 403 elevated, hep be ag positive, hep be ab negative, asma negative (prev positive), ama negative, alpha 1 232 elevated but normal phenotype MM, glucose 249 elevated (please share with primary MD), creatinine 1.12 (prev 1.54), potassium low at 3.1 (please share with primary MD as likely secondary to lasix and they may want to adjust potassium supplment dosage or consider adding spironolactone to help with low potassium and fluid retention), albumin low at 3.0 but improved from prior of 2.3, total bili 3.3 elevated, alk phos 81 (prev 69), ast 45 elevated, alt 20, alyssa negative, wbc 6.6, hgb 10.8 low but up from prev 9.0, mcv high at 99, mch 33.8 elevated, rbc 3.2 (please share with primary md), plt 142 low (increased from 92 on 9/24/21), inr 1.2, hep b core ab positive, hep a ab total negative (recommend getting hep a vaccine as not immune), hep b surf ag positive.   RECAP Of Pre visit hx: He has a PMHx of DM2 (last a1c 7.5% on glimepiride and janumet), hx of right great toe infection 8/2021, and presented to the ED on 9/17/21 with erythema/pain/drainage from right 4th toe. He was recently diagnosed with OM of right 4th digit on 9/10/21 and is s/p 4th digit partial metatarsal resection by Dr. Mathew. Patient did not follow up with ID outpatient for antibiotics following 9/10 surgery. Patient started on Vanc/Zosyn in ED on 9/17  He underwent further surgery by Dr. Mathew on 09/19/2021 including right partial 4th metatarsal resection with wound debridement and application of wound VAC, right excisional debridement of the hallux plantar ulcer down to subcutaneous tissues on 9/19.   While admitted  he was diagnosed with hepatitis B, ultrasound of the liver was concerning for possible liver cirrhosis and ascites and recommended to follow up outpatient with GI.   Also noted to have ANGELINA that was improving.  Discharged on 9/25 with IV Zosyn x6wks at outpatient infusion center (Dr. Behari) with wound vac f/u at Dr Mathew's clinic (podiatry).    Pt notes hx of PARDO/fatty liver disease x 10-15 yrs and treated with diet and exercise and monitoring of labs q6m with pcp. Has not seen hepatology for it.  Was noted to have jaundice during admission with T bili 4.0, direct bili 0.7, indirect bili 3.3. Denies heavy etoh use. No hx of iv drug use. Previously just had labs checked with his pcp every 6 months.  No prior egd. Colon 2-3 yrs ago with no polyps  He has had chronic thrombocytopenia since 2017 (platelets in 130's) and as low as 76 on 7/29/21.  Platelets 142 on admission (9/17) and 101 (9/24)  Ascites first noted during sept hospitalization.    He believes he contracted HBV from his now ex-wife who notified him that she had been exposed to HBV after marital infidelity.  He  in July 2021.  Believes last exposure about April 2021.  Labs 9/25/2021: WBC 6.7, hemoglobin 9.0, MCV 96.7, platelets low 92, sodium 137, potassium 3.3 low, creatinine 1.54 high, calcium 7.9 low.  Labs 9/23/2021: HIV negative  Labs 9/22/2021: Hepatitis B viral  million, hepatitis B E antibody nonreactive  Labs 9/21/2021: Sodium 137 glucose 162, albumin low at 2.3, T bili 2.3 high, AST 69 high, ALT 31, alk phos 69, platelets 109 low, hemoglobin 9.7 low  Labs 9/20/2021: INR 1.39, PT 16.4, ASMA positive 1:40, hep B surface antigen positive, creatinine 1.65, alpha-1 antitrypsin 168, hepatitis A antibody IgM nonreactive, hep B core antibody IgM nonreactive, hep C antibody nonreactive, ceruloplasmin 32, AMA negative, AFP 1.1, vitamin D 11 low  Labs 9/19/2021: T bili 3.2, AST 63, ALT 32, alk phos 70, creatinine 1.75, total protein 6.0, albumin 2.3, sodium 136  Labs 9/18/2021: T bili 4.0, direct bili 0.7, indirect bili 3.3, A1c 7.5, COVID-19 negative   (9/18) GB US: FINDINGS: The pancreas is poorly visualized due to technical factors and overlying bowel gas. Liver and gallbladder are not well assessed due to poor acoustic windows. Increased liver echogenicity is suggested. Gallbladder is nonvisualized. There is a negative sonographic Cameron sign. The common bile duct measures 4 mm . The right kidney measures 12.7 cm in greatest longitudinal dimension. No right-sided hydronephrosis. There is a small to moderate amount of ascites demonstrated within the upper abdomen. Liver demonstrates a slightly nodular contour.   IMPRESSION: Summation limited by poor acoustic windows. The gallbladder was not visualized. Liver was not well assessed. Slightly nodular contour the liver suggests which may reflect underlying cirrhosis. Correlate with clinical findings. Diffuse mild increased liver echogenicity which can be seen with diffuse hepatic steatosis versus diffuse infiltration. Correlate with hepatic enzymes. Ascites.   Plan: 1. Need labs to see how doing. 2. Need to refill lasix and aldactone to prevent going up. 3. He will be there for while. 4. Addy Rowell is Dr Joseluis Zamora is my friend. 5. Pt will then do labs in 1m. 6. Telemed in 1m pending this. 7. Needs 3 egd fo rbanding when back as 2nd one in hospital march 2022 done. 8. refer to oltx team .  Stressed to pt the need for social distancing and strict handwashing and wearing a mask and to follow any other new or added CDC recommendations as this is an evolving target.  Duration of the visit was  minutes with 10 minutes of chart prep and 0 minutes via healow for this TeleMed visit reviewing recent records current status and future plans for the patient.

## 2022-06-15 ENCOUNTER — TELEPHONE ENCOUNTER (OUTPATIENT)
Dept: URBAN - METROPOLITAN AREA CLINIC 92 | Facility: CLINIC | Age: 52
End: 2022-06-15

## 2022-06-15 ENCOUNTER — TELEPHONE ENCOUNTER (OUTPATIENT)
Dept: URBAN - METROPOLITAN AREA CLINIC 86 | Facility: CLINIC | Age: 52
End: 2022-06-15

## 2022-06-15 NOTE — HPI-TODAY'S VISIT:
Dear Analy Beckett printed off your May 28 [place] labs for me after you advsied her of them being done there. Sodium 133 which is slightly low, potassium 4.0,  chloride 101 CO2 27 BUN of 17 creatinine 0.99 glucose 167 albumin was low at 2.6 bilirubin elevated at 4.0 calcium 7.9 which was low AST was 43 slightly up ALT 29 alk phos 66 white blood cell count 4.9 hemoglobin low at 10.1 MCV elevated 93.1 platelet count low at 137. March 31 labs showed sodium 138 potassium 3.8 creatinine 0.88 bilirubin 3.5 AST 48 ALT 17 alk phos 45 hemoglobin 10.1. Unable to calculate meld score. Dr Nolasco

## 2022-07-01 ENCOUNTER — ERX REFILL RESPONSE (OUTPATIENT)
Dept: URBAN - METROPOLITAN AREA CLINIC 86 | Facility: CLINIC | Age: 52
End: 2022-07-01

## 2022-07-01 RX ORDER — FUROSEMIDE 40 MG/1
1 TABLET TABLET ORAL TWICE A DAY
Qty: 60 | Refills: 1 | OUTPATIENT

## 2022-07-01 RX ORDER — SPIRONOLACTONE 50 MG/1
TAKE 1 TABLET BY MOUTH TWICE A DAY FOR 30 DAYS TABLET ORAL
Qty: 60 TABLET | Refills: 0 | OUTPATIENT

## 2022-07-01 RX ORDER — FOLIC ACID 1 MG/1
1 TABLET TABLET ORAL ONCE A DAY
Qty: 30 | Refills: 1 | OUTPATIENT

## 2022-07-01 RX ORDER — FUROSEMIDE 40 MG/1
TAKE 1 TABLET BY MOUTH TWICE A DAY FOR 30 DAYS TABLET ORAL
Qty: 60 TABLET | Refills: 0 | OUTPATIENT

## 2022-07-01 RX ORDER — FOLIC ACID 1 MG/1
TAKE 1 TABLET BY MOUTH EVERY DAY FOR 30 DAYS TABLET ORAL
Qty: 30 TABLET | Refills: 0 | OUTPATIENT

## 2022-07-01 RX ORDER — SPIRONOLACTONE 50 MG/1
1 TABLET TABLET, FILM COATED ORAL
Qty: 60 | Refills: 1 | OUTPATIENT

## 2022-07-13 ENCOUNTER — TELEPHONE ENCOUNTER (OUTPATIENT)
Dept: URBAN - METROPOLITAN AREA CLINIC 86 | Facility: CLINIC | Age: 52
End: 2022-07-13

## 2022-07-31 ENCOUNTER — ERX REFILL RESPONSE (OUTPATIENT)
Dept: URBAN - METROPOLITAN AREA CLINIC 86 | Facility: CLINIC | Age: 52
End: 2022-07-31

## 2022-07-31 RX ORDER — FOLIC ACID 1 MG/1
TAKE 1 TABLET BY MOUTH EVERY DAY FOR 30 DAYS TABLET ORAL
Qty: 30 TABLET | Refills: 0 | OUTPATIENT

## 2022-07-31 RX ORDER — SPIRONOLACTONE 50 MG/1
TAKE 1 TABLET BY MOUTH TWICE A DAY TABLET ORAL
Qty: 60 TABLET | Refills: 0 | OUTPATIENT

## 2022-07-31 RX ORDER — SPIRONOLACTONE 50 MG/1
TAKE 1 TABLET BY MOUTH TWICE A DAY FOR 30 DAYS TABLET ORAL
Qty: 60 TABLET | Refills: 0 | OUTPATIENT

## 2022-07-31 RX ORDER — FOLIC ACID 1 MG/1
TAKE 1 TABLET BY MOUTH EVERY DAY TABLET ORAL
Qty: 30 TABLET | Refills: 0 | OUTPATIENT

## 2022-08-02 ENCOUNTER — ERX REFILL RESPONSE (OUTPATIENT)
Dept: URBAN - METROPOLITAN AREA CLINIC 86 | Facility: CLINIC | Age: 52
End: 2022-08-02

## 2022-08-02 RX ORDER — FUROSEMIDE 40 MG/1
TAKE 1 TABLET BY MOUTH TWICE A DAY TABLET ORAL
Qty: 60 TABLET | Refills: 0 | OUTPATIENT

## 2022-08-02 RX ORDER — FUROSEMIDE 40 MG/1
TAKE 1 TABLET BY MOUTH TWICE A DAY FOR 30 DAYS TABLET ORAL
Qty: 60 TABLET | Refills: 0 | OUTPATIENT

## 2022-09-02 ENCOUNTER — TELEPHONE ENCOUNTER (OUTPATIENT)
Dept: URBAN - METROPOLITAN AREA CLINIC 86 | Facility: CLINIC | Age: 52
End: 2022-09-02

## 2022-09-04 ENCOUNTER — TELEPHONE ENCOUNTER (OUTPATIENT)
Dept: URBAN - METROPOLITAN AREA CLINIC 92 | Facility: CLINIC | Age: 52
End: 2022-09-04

## 2022-09-04 NOTE — HPI-TODAY'S VISIT:
Spoke with pt re updated labs and info that give nAaly gave me some information about your recent admission to [facility].  The laboratories that were last done Aug 16 on you that included a sodium of 133 potassium 3.8 BUN of 25 creatinine 0.73 glucose of 111 albumin 2.7 total bilirubin up to 2.5 AST 79 ALT 74 and alkaline phosphatase 57. August 15 labs showed white blood cell count 4 hemoglobin 9.5 platelet count 101 and MCV 93.4 and INR 1.41. He had a paracentesis done August 16 that showed low albumin of less than 1.5 nucleated cell count that was 685 but neutrophils were only 1%. Aug 2022 ct cirrhotic liver and splenomegaly and varices. Your hepatitis B is no longer under control and your surface antigen is positive with the E antigen positive and E antibody negative and hep B level of 186 million. March 24 hbv 3280 and in May 236 million. He says he had a lapse for a 2 weeks. Hep delta March 2022 negative. It is not good for you to be out of control on the hepatitis B as you can see things can get very decompensated as it appears that they may  currently be. The labs from the discharge summary mention that you are taking folic acid 1 mg a day, Lasix 40 mg twice a day, spironolactone 100 mg a day, glimepiride 4 mg a day, lactulose 30 g p.o. 3 times daily, saxagliptin 5 mg a day, and the Vemlidy 25 mg a day. They mention that you had a clinic appointment the next day for the transplant team but I do not see that it occurred. I do see it was moved to Sept 14. As you recall your last visit was back in April 29 and I believe you are actually out of state at the time and did that visit remotely as you are bringing your parents back down with you to [place] I believe. You are to see us in approximately 4 weeks and you are to follow-up with the transplant team. You no showed for your June 8 appointment. Says that his fluid weight when needs taps is only 220 and down from 270 peal. Getting tapped often. To get a tips procedure also he has some pse baseline.

## 2022-09-07 ENCOUNTER — ERX REFILL RESPONSE (OUTPATIENT)
Dept: URBAN - METROPOLITAN AREA CLINIC 86 | Facility: CLINIC | Age: 52
End: 2022-09-07

## 2022-09-07 RX ORDER — TENOFOVIR ALAFENAMIDE 25 MG/1
TAKE 1 TABLET BY MOUTH ONCE DAILY WITH FOOD TABLET ORAL
Qty: 30 TABLET | Refills: 0 | OUTPATIENT

## 2022-09-07 RX ORDER — TENOFOVIR ALAFENAMIDE 25 MG/1
TAKE 1 TABLET BY MOUTH ONCE DAILY WITH FOOD TABLET ORAL
Qty: 30 TABLET | Refills: 3 | OUTPATIENT

## 2022-09-21 ENCOUNTER — OFFICE VISIT (OUTPATIENT)
Dept: URBAN - METROPOLITAN AREA TELEHEALTH 2 | Facility: TELEHEALTH | Age: 52
End: 2022-09-21
Payer: COMMERCIAL

## 2022-09-21 VITALS — WEIGHT: 188 LBS | BODY MASS INDEX: 25.47 KG/M2 | HEIGHT: 72 IN

## 2022-09-21 DIAGNOSIS — K75.81 NASH (NONALCOHOLIC STEATOHEPATITIS): ICD-10-CM

## 2022-09-21 DIAGNOSIS — E11.69 TYPE 2 DIABETES MELLITUS WITH OTHER SPECIFIED COMPLICATION, WITHOUT LONG-TERM CURRENT USE OF INSULIN: ICD-10-CM

## 2022-09-21 DIAGNOSIS — R18.8 ASCITES OF LIVER: ICD-10-CM

## 2022-09-21 DIAGNOSIS — J90 PLEURAL EFFUSION: ICD-10-CM

## 2022-09-21 DIAGNOSIS — R60.0 LOWER EXTREMITY EDEMA: ICD-10-CM

## 2022-09-21 DIAGNOSIS — E80.4 GILBERTS SYNDROME: ICD-10-CM

## 2022-09-21 DIAGNOSIS — R74.8 ABNORMAL LIVER ENZYMES: ICD-10-CM

## 2022-09-21 DIAGNOSIS — I85.00 ESOPHAGEAL  VARICOSE VEINS: ICD-10-CM

## 2022-09-21 DIAGNOSIS — R76.0 RAISED ANTIBODY TITER: ICD-10-CM

## 2022-09-21 DIAGNOSIS — K74.69 OTHER CIRRHOSIS OF LIVER: ICD-10-CM

## 2022-09-21 DIAGNOSIS — B19.10 HEPATITIS B INFECTION WITHOUT DELTA AGENT WITHOUT HEPATIC COMA, UNSPECIFIED CHRONICITY: ICD-10-CM

## 2022-09-21 DIAGNOSIS — N17.9 ACUTE KIDNEY INJURY: ICD-10-CM

## 2022-09-21 PROCEDURE — 99215 OFFICE O/P EST HI 40 MIN: CPT

## 2022-09-21 RX ORDER — SAXAGLIPTIN 5 MG/1
1 TABLET TABLET, FILM COATED ORAL ONCE A DAY
Status: ACTIVE | COMMUNITY

## 2022-09-21 RX ORDER — TENOFOVIR ALAFENAMIDE 25 MG/1
TAKE 1 TABLET BY MOUTH ONCE DAILY WITH FOOD TABLET ORAL
Qty: 30 | Refills: 3

## 2022-09-21 RX ORDER — TENOFOVIR ALAFENAMIDE 25 MG/1
TAKE 1 TABLET BY MOUTH ONCE DAILY WITH FOOD TABLET ORAL
Qty: 30 TABLET | Refills: 0 | Status: ACTIVE | COMMUNITY

## 2022-09-21 RX ORDER — FOLIC ACID 1 MG/1
TAKE 1 TABLET BY MOUTH EVERY DAY TABLET ORAL
Qty: 30 TABLET | Refills: 0 | Status: ACTIVE | COMMUNITY

## 2022-09-21 RX ORDER — SPIRONOLACTONE 50 MG/1
TAKE 1 TABLET BY MOUTH TWICE A DAY TABLET ORAL
Qty: 60 TABLET | Refills: 0 | Status: ACTIVE | COMMUNITY

## 2022-09-21 RX ORDER — CIPROFLOXACIN 500 MG/1
1 TABLET TABLET, FILM COATED ORAL
Status: ACTIVE | COMMUNITY

## 2022-09-21 RX ORDER — FUROSEMIDE 40 MG/1
TAKE 1 TABLET BY MOUTH TWICE A DAY TABLET ORAL
Qty: 60 TABLET | Refills: 0 | Status: ACTIVE | COMMUNITY

## 2022-09-21 RX ORDER — GLIMEPIRIDE 4 MG/1
1 TABLET WITH BREAKFAST OR THE FIRST MAIN MEAL OF THE DAY TABLET ORAL ONCE A DAY
Status: ACTIVE | COMMUNITY

## 2022-09-21 RX ORDER — LACTULOSE 10 G/10G
1 PACKET SOLUTION ORAL ONCE A DAY
Status: ACTIVE | COMMUNITY

## 2022-09-21 NOTE — EXAM-PHYSICAL EXAM
Telemed self reported:  Gen: no distress. Eyes: no jaundice. Mouth: no thrush. CV: no chest pain. Resp: no wheezes. Abd: no pain. Has some moderate ascites and says slowly inc. Ext: no edema.	 Neuro: no weakness.

## 2022-09-21 NOTE — HPI-TODAY'S VISIT:
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  A copy of this note will be sent to the referring provider.  Facially he appears henriquez.   He says he has done the full eval except for the colon in Nov 2022.  Asked re the hep b issues and why it was so high. He ran out of the vemlidy and he says he pays his insurance every month and no payment x 2m. Needed 2000.00 for meds. He called and sent him vemlidy.  He was off the meds for a week.  Need to be sure that he has no lapses as that may explain the labs.  Aug 16 on you that included a sodium of 133 potassium 3.8 BUN of 25 creatinine 0.73 glucose of 111 albumin 2.7 total bilirubin up to 2.5 AST 79 ALT 74 and alkaline phosphatase 57. August 15 labs showed white blood cell count 4 hemoglobin 9.5 platelet count 101 and MCV 93.4 and INR 1.41.  He last had a paracentesis done August 16 that showed low albumin of less than 1.5 nucleated cell count that was 685 but neutrophils were only 1%.  Aug 2022 ct cirrhotic liver and splenomegaly and varices.  I was concerned hepatitis B is no longer under control and your surface antigen is positive with the E antigen positive and E antibody negative and hep B level of 186 million.  March 24 hbv 3280 and in May 236 million. He says he had a lapse for a 2 weeks. Hep delta March 2022 negative.   The labs from the discharge summary mention that you are taking folic acid 1 mg a day, Lasix 40 mg twice a day, spironolactone 100 mg a day, glimepiride 4 mg a day, lactulose 30 g p.o. 3 times daily, saxagliptin 5 mg a day, and the Vemlidy 25 mg a day.  Sept 14 saw oltx team. Asked re the tips and to meet with IR soon for this.  As you recall your last visit was back in April 29 and I believe you are actually out of state at the time and did that visit remotely as you are bringing your parents back down with you to [place] I believe. You are to see us in approximately 4 weeks and you are to follow-up with the transplant team. You no showed for your June 8 appointment. Says that his fluid weight when needs taps is only 220 and down from 270 peal. Getting tapped often. To get a tips procedure also he has some pse baseline.  May 28 [place] labs for me after you advsied her of them being done there. Sodium 133 which is slightly low, potassium 4.0,  chloride 101 CO2 27 BUN of 17 creatinine 0.99 glucose 167 albumin was low at 2.6 bilirubin elevated at 4.0 calcium 7.9 which was low AST was 43 slightly up ALT 29 alk phos 66 white blood cell count 4.9 hemoglobin low at 10.1 MCV elevated 93.1 platelet count low at 137. March 31 labs showed sodium 138 potassium 3.8 creatinine 0.88 bilirubin 3.5 AST 48 ALT 17 alk phos 45 hemoglobin 10.1. Unable to calculate meld score.   May 5 [facility] labs sent in from [place]. Glucose 73 BUN 21 creatinine 0.84 sodium 134 which is slightly low potassium 4.1 chloride 101 CO2 of 28 calcium low at 8.2 albumin low at 3.0 bilirubin elevated 2.7 alkaline phosphatase 70 AST 82 ALT 54 INR 1.3.  White cell count 4.6 hemoglobin 10.2 which is low.  Platelet count 129 which is low.  MCV 95.5.  Neutrophils normal at 2608 and lymphocytes 1003. Prior march labs: na 138 and cr 0.8 and alb 3.8 and tb 3.5 and ast 48 and alt 17 and alk 45 and hg 10.1. Meld 13 and meld na16.  Pt says 207 and his lowest 166 in aug 2022  and now 188 - 192.  Folks transitioned down to [place].  Lasix 40mg po bid. Aldactone 50 mg po bid.  Prior visit he was to 280 and had delayed coming for the ascites eval due to insurance issues. Needed 3 taps and iv alb and lost 100 in the hospital.   March 31 2022 labs when left na 138 and k 3.8 and cl99 and co2 29 and glu 190 and cr 0.88 and alb 3.8 and tv 3.5 and tb 3.5,ast 48 and alt 17 and alk 45 and wbc 3.8 hg 10.1.  March 23 tap less than 1.5 alb  and alb less than 3 and nuc 89 and neutrophils 3%   Document Type:	CT Abdomen + Pelvis w/o IV Contrast Document Date:	August 14, 2022 04:02 EDT Document Status:	Auth (Verified) Document Title:	CT Abdomen + Pelvis w/o IV Contrast Performed By:	Jitendra Braswell  Verified By:	Sandra Dwyer on August 14, 2022 04:56 EDT Encounter info:	[id number], [facility], Inpatient, 8/14/2022 - 8/16/2022  * Final Report *  Reason For Exam Abdominal pain unspecified  REPORT EXAM: CT Abdomen + Pelvis w/o IV Contrast  CLINICAL INDICATION: Abdominal pain unspecified.  TECHNIQUE: Noncontrast images were obtained through the abdomen and pelvis. ESRC.1.7.2  COMPARISON: MRI abdomen for 3/20/2022  FINDINGS:  Lower Thorax: Small left pleural effusion. Moderate hiatal hernia.  Liver: Cirrhotic and shrunken appearance of the hepatic parenchyma, and nodular contour with perihepatic ascites. No focal lesions.  Gallbladder/Biliary Tree: Normal.  Spleen: Redemonstration of splenomegaly.  Pancreas: Normal.  Adrenal Glands: Normal.  Kidneys/Ureters: Normal.  Gastrointestinal: No bowel obstruction. Scattered fecal material within loops of large bowel.   Bladder: Normal.  Prostate/Seminal Vesicles: Normal.  Lymph Nodes: No pathologically enlarged lymphadenopathy.  Vessels: Lack of IV contrast limits evaluation of vascular structures. Aorta is not aneurysmal. Atherosclerotic calcifications of the splenic vessels. Prominent paraumbilical vein.  Peritoneum/Retroperitoneum: Large volume intra-abdominal ascites.  Bones/Soft Tissues: Bilateral gynecomastia. Small fluid containing umbilical hernia. Right greater than left small fat-containing inguinal hernias. Mild lower pelvic soft tissue edema. Multilevel degenerative changes with mild retrolisthesis of L5 on S1. No aggressive soft tissue or osseous lesions.   IMPRESSION:  Cirrhotic hepatic morphology with large volume intra-abdominal ascites, splenomegaly, and portosystemic varices suggestive of underlining portal hypertension.  The images were reviewed and interpreted by Paris Dwyer MD.  Signature Line *** Final ***  Electronically Signed By:  Sandra Dwyer on  08/14/2022 04:56    Document Type:	MRI Abdomen w/ + w/o Contrast Document Date:	April 01, 2022 19:51 EDT Document Status:	Auth (Verified) Document Title:	MRI Abdomen w/ + w/o Contrast Performed By:	Jose Juan Omalley  Verified By:	Jojo Salcedo on April 03, 2022 10:32 EDT Encounter info:	[id number], [facility], Inpatient, 3/23/2022 - 4/02/2022  * Final Report *  Reason For Exam Liver disease; hcc screen;Liver disease  REPORT EXAM: MRI Abdomen w/ + w/o Contrast  CLINICAL INDICATION: History of hepatitis B cirrhosis with ascites. And esophageal varices status post endoscopic banding on 3/25/2022. Evaluate for HCC.  TECHNIQUE: Multisequence, multiplanar MRI of the abdomen was performed without and with intravenous contrast. ESRC.2.7.3  CONTRAST: 17cc cc of Prohance  COMPARISON: MRI abdomen 1/3/2022  FINDINGS:  Lower Thorax: Small left pleural effusion and left lower lobe atelectasis similar to prior.  Liver: No fat or iron. Cirrhotic liver morphology similar to prior. No lesions.  Gallbladder/Biliary Tree: Mild diffuse gallbladder wall thickening related to third spacing/underlying liver disease. No stones. No biliary ductal dilatation.  Spleen: Unchanged splenomegaly measuring up to 16.5 cm in craniocaudal length. Small curvilinear T2 hypointensity along the lateral subcapsular region compatible with small remote intracapsular bleed.  Pancreas: Normal.  Adrenal Glands: Normal.   Kidneys/Ureters: Normal.  Gastrointestinal: Normal.   Lymph Nodes: No pathologically enlarged lymph nodes.  Vessels: Unchanged large paraesophageal varices. Additionally there are perigastric, perisplenic, and peritoneal/retroperitoneal varices are similar to prior exam. Recanalized paraumbilical vein. No aortic aneurysm. Patent portal vein, splenic vein, hepatic veins, and SMV.  Peritoneum/Retroperitoneum: Large volume ascites similar to prior. Unchanged smooth diffuse peritoneal enhancement without focal nodularity, similar to prior.  Bones/Soft Tissues: Expected marrow signal. No worrisome osseous lesions.  IMPRESSION:     1.  Cirrhotic liver morphology without evidence of hepatocellular carcinoma. 2.  Sequela of portal hypertension, including large volume ascites, splenomegaly, and upper abdominal varices. 3.  Unchanged diffuse mild peritoneal enhancement which could be from chronic peritoneal irritation from multiple prior paracenteses. However, if there is concern for peritoneal infection, recommend fluid sampling.  The images were reviewed and interpreted by Jojo Salcedo MD.  Signature Line *** Final ***  Electronically Signed By:  Jojo Salcedo on  04/03/2022 10:32  Dictated by:  Jose Juan Omalley   2/1/22- wbc 5.3 normal, rbc 3.18 low with polychromasia, macrocytes and anisocytosis (please share with primary md), hgb 10.3 low (prev 11.1 and 9.9), platelets 76 low (actual platelet count may be slightly higher than reported due to clumping, prev 148), glucose 137 high but improved (prev 310), creatinine 0.88 normal, sodium 134 normal, calcium low at 8 (please share with primary md), albumin is low at 2.9 (but improved from prev of 2.3), total bili 3.7 high (this is up from prev of 2.1), alk phos 141 high (prev 126), ast 163 high (prev 123), alt 104 high (prev 84), hep b dna 9910 (prev 13,270), inr 1.2.  MELD 13, MELD- NA 16.  1/25/22- white blood count 5.5 normal, rbc 3.35 low, hemoglobin 11.1 low (prev 11.1), mcv 99 high, platelets 148 low (prev 140), glucose very high at 310 (please follow up with primary md regarding diabetes control), sodium 134 normal,  creatinine 0.99 normal, calcium 8.1 low (please share with primary md), albumin 2.3 low, total bilirubin 2.1 high (improved from 2.8 prev), alk phos 126 high (prev 120), ast 123 high (prev 133), alt 84 high (prev 68), hep b viral load 13,270 (prev 20,890), inr 1.3 (prev 1.1).   MELD 12, MELD-NA 15.   Scheduled for repeat egd 2/1/22 with Dr. Estes and he did that in hospital and march 2022 grade 3 [id number] and 6 bands and redo in 1m and not done yet as left and do when back.  Referred to cardiology given cardiomegaly and left ventricular dilation seen on CT chest and to see them in april as also cancelled due to insurance.  January 14 labs show INR normal at 1.1 and compared to October 18 that is actually lower than 1.2.  Sugar down to 156 from preadmission levels in the 290.  BUN of 16 creatinine 1.11 sodium 135 potassium 4.2 calcium slightly low at 8.4 but up from 7.93 the last admission when it was 7.9.  Albumin still up at 3.3 and previously had been 2.33 that admission.  Total bilirubin still elevated 2.8.  Alkaline phosphatase now normal at 120.   and ALT 68.  He to follow these labs.  Hep B down to 20,890. White blood cell count 5.7 hemoglobin 11.1 which is diminished.  Platelet count 140.  MCV 95.  Neutrophils 3.9 and lymphocytes 1.0.   One looks at your hospital labs as comparison instead of the nov labs: Jan 8: cr 1.07 and alb 2.7 and tb 3.3 and ast 66 and alt 28.   Jan 7 hg 9.7 and plat 101. Inr 1.57.  HBV 154311 then.   HBV noted and then led to hbv tx and we started the Vemlidy in Nov 2021.  Pt admtited from clinic Dec 28 through jan 7 approx and had 3 abd taps (prior had 2 taps pre admit). Varied 5 to 15 liters. IV alb and diuresis and lost 284 to 191.   On lasix 40mg po bid and aldactone 50mg po qd.  HBV level was checked in the hospital.  [id number] eag pos and eab neg. That is coming down from a much higher level at over 890 million.  Document Type:	MRI Abdomen w/ + w/o Contrast Document Date:	January 03, 2022 18:00 EST Document Status:	Auth (Verified) Document Title:	MRI Abdomen w/ + w/o Contrast Performed By:	Deacon Huang  Verified By:	Deacon Huang on January 04, 2022 07:47 EST Encounter info:	[id number], [facility], Inpatient, 12/28/2021 - 1/08/2022  * Final Report *  Reason For Exam Liver disease  REPORT EXAM: MRI Abdomen w/ + w/o Contrast  CLINICAL INDICATION: Liver disease.  TECHNIQUE: Multisequence, multiplanar MRI of the abdomen was performed without and with intravenous contrast. ESRC.2.7.3  CONTRAST: 20 cc of Prohance  COMPARISON: 12/10/2021.  FINDINGS:  Lower Thorax: Small to moderate-sized left pleural effusion. Nonspecific nodular signal abnormality in the mid right middle and lower lobes, suboptimally evaluated on MRI.  Liver: No fat or iron. Morphologic changes of chronic liver disease including nodular surface contour, lobar redistribution and fissural widening. No definite focal lesion.   Gallbladder/Biliary Tree: Nonspecific mural edema, may be reactive to chronic liver disease and/or third spacing of fluid.  Spleen: Enlarged measuring up to 15.6 cm. Scattered punctate foci of signal dropout on in phase compared to out of phase imaging suggestive of [place] bodies.  Pancreas: Normal.  Adrenal Glands: Normal.   Kidneys/Ureters: Normal.  Gastrointestinal: Circumferential wall thickening with mural edema involving the ascending colon, likely sequelae of portal colopathy.   Lymph Nodes: Normal.  Vessels: Recanalized paraumbilical vein with large paraesophageal, perigastric and perisplenic varices. Patent portal veins.  Peritoneum/Retroperitoneum: Moderate volume abdominopelvic ascites.  Bones/Soft Tissues: Anasarca. No aggressive osseous lesion.  IMPRESSION:     Examination again suboptimal secondary to moderate volume ascites causing dielectric effect artifact.  Morphologic changes of chronic liver disease with sequelae of portal hypertension including moderate volume ascites, splenomegaly and upper abdominal varices. No definite focal hepatic lesion.  Moderate left pleural effusion. Nonspecific signal abnormality in the right middle and lower lobes, suboptimally evaluated on MRI. This can be further evaluated with dedicated CT chest for evaluation of airspace disease as clinically warranted.   Signature Line *** Final ***  Electronically Signed By:  Deacon Huang on  01/04/2022 07:47  Dictated by:  Deacon Huang   Chest was also looked at but edema limited for the nodularity and they recommended to redo when more diuresed.   Document Type:	CT Chest w/o Contrast Document Date:	January 05, 2022 10:22 EST Document Status:	Auth (Verified) Document Title:	CT Chest w/o Contrast Performed By:	Davey Zuniga  Verified By:	Caitlyn Bledsoe on January 05, 2022 14:35 EST Encounter info:	[id number], [facility], Inpatient, 12/28/2021 - 1/08/2022  * Final Report *  Reason For Exam Mediastinal mass  REPORT EXAM: CT Chest w/o Contrast  CLINICAL INDICATION:  Mediastinal mass. MASS  TECHNIQUE: Multiple-row detector helical CT examination of the chest without IV contrast. Axial, sagittal, and coronal reconstructed images.  COMPARISON:  Chest x-ray 12/31/2021.    FINDINGS:  Support Devices:  None.    Mediastinum: Cardiomegaly with left ventricular dilation. No pericardial effusion. Slightly low-attenuation of ventricular blood pool which may reflect anemia. Thickened appearance of the mid and distal esophagus.  Lymph nodes: No pathologically enlarged mediastinal, hilar, or axillary lymph nodes.     Airways: Central airways are patent. Mucus plugging within the dependent distal basilar airways.  Lungs/Pleura: Groundglass/consolidative airspace opacities within the bilateral lung bases, left greater than right. Peripheral groundglass opacities within the right middle lobe and along the right minor fissure. Bibasilar atelectasis. No pulmonary edema. Small left pleural effusion. No pneumothorax.    Upper abdomen: Large volume abdominal ascites. Mural gallbladder wall thickening. Splenomegaly. Cirrhotic morphology of the liver. These findings are better characterized on MRI of the abdomen dated 1/4/2022. Curvilinear densities face the gastrohepatic ligament likely represent varicosities.  Bones and soft tissues:  Imaged thyroid gland is normal. No acute osseous abnormality multilevel degenerative disc disease with minimal anterior disc osteophytosis. Diffuse anasarca.. Symmetric bilateral gynecomastia    IMPRESSION:   1. Basilar predominant airspace opacities and small left pleural effusion is most consistent with resolving pulmonary edema.  2. The wall of the mid/distal esophagus appears severely thickened. These findings may reflect promt documented sequela of portal hypertension. However, it is difficult to exclude esophageal malignancy and follow-up contrast enhanced CT chest is recommended for further characterization.  3. Peripheral fissural and right middle lobe nodularity. Findings are nonspecific but for further characterization a follow-up CT chest is recommended after resolution of left pleural effusion and when the patient is able to sustain a inspirational breath-hold.  The images were reviewed and interpreted by Caitlyn Bledsoe MD.  He did the egd and banded x 5 and Dr Beauchamp her and need to redo that in 1m. has appt to do with Dr Estes. Jan 7: Large varices and 5 bands and complete erradication and mild phtn gastropathy.  Needs to be caught up.  Prior visit with [facility]: Dec 28  Pt returns today with complaint of increased abdominal distention, sob, and increased weight.  Currently weighs 284 lbs.  Prior weight 215 (estimate) on 10/18/21, 240 (estimate) on 11/15/21, and  274 11/29/21.    MRI 12/10/21 showed large left pleural effusion, has not heard back from pcp in regards to further mgmt of this.  Admits to orthopnea requiring 3-4 pillows.  O2 sat 90% in office.  Presents today in wheelchair due to difficulty with walking secondary to excess weight, swelling in legs, abdomen and SOB.   12/10/21 MRI unfortunately was limited somewhat by your ascites problem. They mention that you had a large layering left pleural effusion with bibasilar atelectasis.  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 They mention he had a shrunken cirrhotic appearing liver there was again limited in its exam but with no obvious lesion but again was limited. The gallbladder and biliary tree showed no discrete abnormality. Spleen was enlarged at 16.5 cm. Pancreas head no gross abnormality.  Neither did the kidneys. Lymph nodes were suboptimally seen but with no gross lymphadenopathy. The portal vein appeared to be grossly patent.  You had a large volume of ascites and some anasarca in your tissues. They recommend given your fluid issues that for the next MRI to do the MRI not too long after after you have a paracentesis done.  That will/could diminish some of the artifact issues that we had this time.   Unfortunately,  not always easy to coordinate these scans as an outpatient with a tap given the limitations of how things are scheduled independently. The hope is that with your hepatitis B being on treatment that the liver labs will cool off as well as some of the factors leading to the speed of this fluid accumulation.  Unfortunately the HBV medicine do take a period of time to drop the HBV levels and inflammation from this particularly if the virus level is higher to drop to start with.  S/p paracentesis with 5L ascites removed on 11/1/21, states more fluid remained but told they could only remove 5L max.    Underwent repeat paracentesis on 12/1/21 with 13.5 L ascites removed.     IV abx completed end of Oct 2021 for osteomyelitis, cleared by ID but will be on disability for 2 more months per podiatry.     he saw [facility] dietician re the low sodium diet.  Last a1c 6.5% 11/29/21.    11/29/21 labs- vitamin b12 1099 normal , folate 3.7 normal, glucose 290 (significant elevated, please share with primary MD), creatinine 0.94 normal, monocytes absolute 1.1 high, sodium 131 low, chloride 93 low, calcium 7.9 low, albumin 2.3 (prev 2.8) low, total bili 2.7 high (prev 3.5 on 11/5/21), alk phos 148 elevated (prev 100 11/5/21), ast 57 elevated (prev 61 11/5/21), alt 26 normal (prev 23 11/5/21), hgb 9.9 low (prev 12.2 11/5/21), rbc 3.0 low, platelets 146 low (prev 163).  Please share these labs with primary MD.  Would like to repeat labs again in 2 weeks prior to next office visit on 12/28.  HBV DNA viral load down to 319 million (from prev 890 million prior to starting vemlidy)  10/18/21- TIBC slightly low at 212, iron sat normal at 33, igg 2396 elevated, igm 141, ferritin 403 elevated, hep be ag positive, hep be ab negative, asma negative (prev positive), ama negative, alpha 1 232 elevated but normal phenotype MM, glucose 249 elevated (please share with primary MD), creatinine 1.12 (prev 1.54), potassium low at 3.1 (please share with primary MD as likely secondary to lasix and they may want to adjust potassium supplment dosage or consider adding spironolactone to help with low potassium and fluid retention), albumin low at 3.0 but improved from prior of 2.3, total bili 3.3 elevated, alk phos 81 (prev 69), ast 45 elevated, alt 20, alyssa negative, wbc 6.6, hgb 10.8 low but up from prev 9.0, mcv high at 99, mch 33.8 elevated, rbc 3.2 (please share with primary md), plt 142 low (increased from 92 on 9/24/21), inr 1.2, hep b core ab positive, hep a ab total negative (recommend getting hep a vaccine as not immune), hep b surf ag positive.   RECAP Of Pre visit hx: He has a PMHx of DM2 (last a1c 7.5% on glimepiride and janumet), hx of right great toe infection 8/2021, and presented to the ED on 9/17/21 with erythema/pain/drainage from right 4th toe. He was recently diagnosed with OM of right 4th digit on 9/10/21 and is s/p 4th digit partial metatarsal resection by Dr. Mathew. Patient did not follow up with ID outpatient for antibiotics following 9/10 surgery. Patient started on Vanc/Zosyn in ED on 9/17  He underwent further surgery by Dr. Mathew on 09/19/2021 including right partial 4th metatarsal resection with wound debridement and application of wound VAC, right excisional debridement of the hallux plantar ulcer down to subcutaneous tissues on 9/19.   While admitted  he was diagnosed with hepatitis B, ultrasound of the liver was concerning for possible liver cirrhosis and ascites and recommended to follow up outpatient with GI.   Also noted to have ANGELINA that was improving.  Discharged on 9/25 with IV Zosyn x6wks at outpatient infusion center (Dr. Behari) with wound vac f/u at Dr Mathew's clinic (podiatry).    Pt notes hx of PARDO/fatty liver disease x 10-15 yrs and treated with diet and exercise and monitoring of labs q6m with pcp. Has not seen hepatology for it.  Was noted to have jaundice during admission with T bili 4.0, direct bili 0.7, indirect bili 3.3. Denies heavy etoh use. No hx of iv drug use. Previously just had labs checked with his pcp every 6 months.  No prior egd. Colon 2-3 yrs ago with no polyps  He has had chronic thrombocytopenia since 2017 (platelets in 130's) and as low as 76 on 7/29/21.  Platelets 142 on admission (9/17) and 101 (9/24)  Ascites first noted during sept hospitalization.    He believes he contracted HBV from his now ex-wife who notified him that she had been exposed to HBV after marital infidelity.  He  in July 2021.  Believes last exposure about April 2021.  Labs 9/25/2021: WBC 6.7, hemoglobin 9.0, MCV 96.7, platelets low 92, sodium 137, potassium 3.3 low, creatinine 1.54 high, calcium 7.9 low.  Labs 9/23/2021: HIV negative  Labs 9/22/2021: Hepatitis B viral  million, hepatitis B E antibody nonreactive  Labs 9/21/2021: Sodium 137 glucose 162, albumin low at 2.3, T bili 2.3 high, AST 69 high, ALT 31, alk phos 69, platelets 109 low, hemoglobin 9.7 low  Labs 9/20/2021: INR 1.39, PT 16.4, ASMA positive 1:40, hep B surface antigen positive, creatinine 1.65, alpha-1 antitrypsin 168, hepatitis A antibody IgM nonreactive, hep B core antibody IgM nonreactive, hep C antibody nonreactive, ceruloplasmin 32, AMA negative, AFP 1.1, vitamin D 11 low  Labs 9/19/2021: T bili 3.2, AST 63, ALT 32, alk phos 70, creatinine 1.75, total protein 6.0, albumin 2.3, sodium 136  Labs 9/18/2021: T bili 4.0, direct bili 0.7, indirect bili 3.3, A1c 7.5, COVID-19 negative   (9/18) GB US: FINDINGS: The pancreas is poorly visualized due to technical factors and overlying bowel gas. Liver and gallbladder are not well assessed due to poor acoustic windows. Increased liver echogenicity is suggested. Gallbladder is nonvisualized. There is a negative sonographic Cameron sign. The common bile duct measures 4 mm . The right kidney measures 12.7 cm in greatest longitudinal dimension. No right-sided hydronephrosis. There is a small to moderate amount of ascites demonstrated within the upper abdomen. Liver demonstrates a slightly nodular contour.   IMPRESSION: Summation limited by poor acoustic windows. The gallbladder was not visualized. Liver was not well assessed. Slightly nodular contour the liver suggests which may reflect underlying cirrhosis. Correlate with clinical findings. Diffuse mild increased liver echogenicity which can be seen with diffuse hepatic steatosis versus diffuse infiltration. Correlate with hepatic enzymes. Ascites.   Plan: 1. Need to follow labs and see how doing. 2. We will do labs in 1m and 3m. 3. If does labs with [facility] let us know. 4. He is going to pursue possible tips with [facility]. 5. Getting tap q 1-1.5month. 6. Pt is doing scopes with [facility].  Stressed to pt the need for social distancing and strict handwashing and wearing a mask and to follow any other new or added CDC recommendations as this is an evolving target.  Duration of the visit was  47 minutes with 20 minutes of chart prep reviewing [facility] oltx notes and tests and scans and labs and then 27 min via [facility] for this TeleMed visit reviewing recent records current status and future plans for the patient.

## 2022-10-03 ENCOUNTER — LAB OUTSIDE AN ENCOUNTER (OUTPATIENT)
Dept: URBAN - METROPOLITAN AREA TELEHEALTH 2 | Facility: TELEHEALTH | Age: 52
End: 2022-10-03

## 2022-11-26 PROBLEM — 128926000 POSTPROCEDURAL STATES: Status: ACTIVE | Noted: 2022-11-26

## 2022-12-03 ENCOUNTER — LAB OUTSIDE AN ENCOUNTER (OUTPATIENT)
Dept: URBAN - METROPOLITAN AREA TELEHEALTH 2 | Facility: TELEHEALTH | Age: 52
End: 2022-12-03

## 2022-12-05 ENCOUNTER — LAB OUTSIDE AN ENCOUNTER (OUTPATIENT)
Dept: URBAN - METROPOLITAN AREA TELEHEALTH 2 | Facility: TELEHEALTH | Age: 52
End: 2022-12-05

## 2022-12-05 ENCOUNTER — OFFICE VISIT (OUTPATIENT)
Dept: URBAN - METROPOLITAN AREA TELEHEALTH 2 | Facility: TELEHEALTH | Age: 52
End: 2022-12-05
Payer: COMMERCIAL

## 2022-12-05 VITALS — WEIGHT: 176 LBS | HEIGHT: 72 IN | BODY MASS INDEX: 23.84 KG/M2

## 2022-12-05 DIAGNOSIS — B19.10 HEPATITIS B INFECTION WITHOUT DELTA AGENT WITHOUT HEPATIC COMA, UNSPECIFIED CHRONICITY: ICD-10-CM

## 2022-12-05 DIAGNOSIS — K74.69 OTHER CIRRHOSIS OF LIVER: ICD-10-CM

## 2022-12-05 DIAGNOSIS — K75.81 NASH (NONALCOHOLIC STEATOHEPATITIS): ICD-10-CM

## 2022-12-05 DIAGNOSIS — R18.8 ASCITES OF LIVER: ICD-10-CM

## 2022-12-05 PROCEDURE — 99215 OFFICE O/P EST HI 40 MIN: CPT

## 2022-12-05 RX ORDER — FOLIC ACID 1 MG/1
TAKE 1 TABLET BY MOUTH EVERY DAY TABLET ORAL
Qty: 30 TABLET | Refills: 0 | Status: ACTIVE | COMMUNITY

## 2022-12-05 RX ORDER — RIFAXIMIN 550 MG/1
1 TABLET TABLET ORAL TWICE A DAY
Status: ACTIVE | COMMUNITY

## 2022-12-05 RX ORDER — SPIRONOLACTONE 50 MG/1
TAKE 1 TABLET BY MOUTH TWICE A DAY TABLET ORAL
Qty: 60 TABLET | Refills: 0 | Status: ACTIVE | COMMUNITY

## 2022-12-05 RX ORDER — TENOFOVIR ALAFENAMIDE 25 MG/1
TAKE 1 TABLET BY MOUTH ONCE DAILY WITH FOOD TABLET ORAL
Qty: 30 | Refills: 3

## 2022-12-05 RX ORDER — LACTULOSE 10 G/10G
1 PACKET SOLUTION ORAL ONCE A DAY
Status: ACTIVE | COMMUNITY

## 2022-12-05 RX ORDER — CIPROFLOXACIN 500 MG/1
1 TABLET TABLET, FILM COATED ORAL
Status: DISCONTINUED | COMMUNITY

## 2022-12-05 RX ORDER — FUROSEMIDE 40 MG/1
TAKE 1 TABLET BY MOUTH TWICE A DAY TABLET ORAL
Qty: 60 TABLET | Refills: 0 | Status: ACTIVE | COMMUNITY

## 2022-12-05 RX ORDER — SAXAGLIPTIN 5 MG/1
1 TABLET TABLET, FILM COATED ORAL ONCE A DAY
Status: ACTIVE | COMMUNITY

## 2022-12-05 RX ORDER — TENOFOVIR ALAFENAMIDE 25 MG/1
TAKE 1 TABLET BY MOUTH ONCE DAILY WITH FOOD TABLET ORAL
Qty: 30 | Refills: 3 | Status: ACTIVE | COMMUNITY

## 2022-12-05 RX ORDER — GLIMEPIRIDE 4 MG/1
1 TABLET WITH BREAKFAST OR THE FIRST MAIN MEAL OF THE DAY TABLET ORAL ONCE A DAY
Status: ACTIVE | COMMUNITY

## 2022-12-05 NOTE — HPI-TODAY'S VISIT:
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  A copy of this note will be sent to the referring provider.  Pt had tips done through [facility] in Oct 2022 and he says that he has needed one tap after the tips and none since.  Main issue for oltx and he says mental status has been doing well.  2022 na 134 and k 4.2 and cl 105 and co2 24 and ca 7.5 and bun 12 and cr 0.86 and glu 110. Tp 5.4 and alb 2.1 and alk 75 and ast 114 and alt 94 and tb 4.9 and wbc 7.5 and hg 10.2 and hct 29.3 and mcv 95.8 and plat 112. Ammonia 84.  Needs to check hep b dna and he is still on vemlidy.  Doppler : EXAM: US ABDOMEN DOPPLER COMPLETE   CLINICAL INDICATION: cirrhosis, S/P TIPS.   TECHNIQUE: Grayscale, pulsed wave and color Doppler sonography of the upper abdomen were performed. ESRC.3.7.1   COMPARISON: 2022   FINDINGS:  Liver: Cirrhotic liver morphology. No lesions.   Bile Ducts: No dilated intrahepatic biliary radicles. Common duct measures 4 mm.   Gallbladder: Poorly visualized, wall appears thickened where visualized. Cameron's sign is negative.   Pancreas: Unremarkable where visualized.   Right Kidney: Measures 10.2 cm. Normal echogenicity. No hydronephrosis.   Left Kidney: Measures 11.0 cm. Normal echogenicity. No hydronephrosis.   Spleen: Measures 16.2 cm.   Aorta: Normal caliber where imaged.   IVC: Normal proximally, not imaged distally.    Hepatic Veins: Patent.   Portal Veins: Hepatopetal flow. 32 cm/s.   TIPS: Patent: Portal venous end 144 cm/s, midportion 180 cm/s, hepatic venous end, 119 cm/s.   Hepatic Arteries: Peak systolic velocity 92 cm/s. Resistive index 0.74.   Other: Ascites.   IMPRESSION:   1. Patent TIPS. 2. Cirrhotic liver. 3. Ascites. 4. Splenomegaly.   2022 tap: Patient Name:  LIZY SOMMERS       : 1970    SEX: Male Ordering Physician:  NAKIA ARAIZA Location:  [facility]   ESRC.IR.1   Indication: Patient with history of cirrhosis and recurrent ascites. Paracentesis requested.   Procedure:  Ultrasound-guided paracentesis   Findings:  1.   Successful removal of 7500 mL of abdominal fluid.   IMPRESSION: Impression:  Successful ultrasound-guided paracentesis.  Oct 31 2022 tips Narrative & Impression Patient Name:  LIZY SOMMERS       : 1970    SEX: Male Ordering Physician:  RILEY JIMENES Location:  [facility].IR.1   Date of exam:  10/31/2022    Clinical Indication:  Refractory ascites   Procedure:    1.  Primary TIPSS 2.  Embolization of gastroesophageal varices. 3.  Paracentesis 4.  Triple lumen central venous catheter placement with ultrasound and fluoroscopic guidance   Findings:    1.  Right internal jugular vein is ultrasonographically patent and compresses.  Needle entry was documented. 2.  Right hepatic venogram confirms vein patency. 3.  Portogram demonstrates patent portal veins with a large left gastric vein varices. 4.  The initial portosystemic gradient is 13 mmHg. 5.  Post-TIPSS portosystemic gradient is 7 mmHg. 6.  The TIPSS has a smooth course and extends from the right hepatic vein to right portal vein. 7.  There is good flow through the shunt at the conclusion of the procedure with minimal appreciable filling of varices. 8.  3.5 liters of fluid were drained during the procedure. 9.  New right internal jugular approach triple-lumen catheter terminates at the superior cavoatrial junction.   IMPRESSION: Impression:    1.  Successful creation of TIPS from right hepatic vein to right portal vein with a 10 mm x 8 cm x 2 cm controlled-expansion Viatorr stent (ballooned to 10 mm).  2.  The portosystemic gradient was reduced from 13 mm Hg to 7 mmHg.  10-4 13 liter tap 8-16 9.5 liter tap  Aug 2022 CT EXAM: CT Abdomen + Pelvis w/o IV Contrast CLINICAL INDICATION: Abdominal pain unspecified. TECHNIQUE: Noncontrast images were obtained through the abdomen and pelvis. ESRC.1.7.2 COMPARISON: MRI abdomen for 3/20/2022 FINDINGS: Lower Thorax: Small left pleural effusion. Moderate hiatal hernia. Liver: Cirrhotic and shrunken appearance of the hepatic parenchyma, and nodular contour with perihepatic ascites. No focal lesions. Gallbladder/Biliary Tree: Normal. Spleen: Redemonstration of splenomegaly. Pancreas: Normal. Adrenal Glands: Normal. Kidneys/Ureters: Normal. Gastrointestinal: No bowel obstruction. Scattered fecal material within loops of large bowel. Bladder: Normal. Prostate/Seminal Vesicles: Normal. Lymph Nodes: No pathologically enlarged lymphadenopathy. Vessels: Lack of IV contrast limits evaluation of vascular structures. Aorta is not aneurysmal. Atherosclerotic calcifications of the splenic vessels. Prominent paraumbilical vein. Peritoneum/Retroperitoneum: Large volume intra-abdominal ascites. Bones/Soft Tissues: Bilateral gynecomastia. Small fluid containing umbilical hernia. Right greater than left small fat-containing inguinal hernias. Mild lower pelvic soft tissue edema. Multilevel degenerative changes with mild retrolisthesis of L5 on S1. No aggressive soft tissue or osseous lesions. IMPRESSION: Cirrhotic hepatic morphology with large volume intra-abdominal ascites, splenomegaly, and portosystemic varices suggestive of underlining portal hypertension. The images were reviewed and interpreted by Paris Dwyer MD. *** Final ***   Electronically Signed By:  Sandra Dwyer on  2022 04:56   Dictated by:  Jitendra Braswell    Recent taps: paracentesis on 2022 with 7.5 L removed. Oct 31 3.5 l Prior he had a 13 L tap done on 2022.  He also had a 9.5 L tap done on .  He had a 16 L tap done on May 27.  He was to do a  colonoscopy scheduled at [place] with  but issues and ride could not bring him. Redoing in .  Prior: He ran out of the vemlidy and he says no more lapses.  Aug 16 on you that included a sodium of 133 potassium 3.8 BUN of 25 creatinine 0.73 glucose of 111 albumin 2.7 total bilirubin up to 2.5 AST 79 ALT 74 and alkaline phosphatase 57. August 15 labs showed white blood cell count 4 hemoglobin 9.5 platelet count 101 and MCV 93.4 and INR 1.41.  He last had a paracentesis done  that showed low albumin of less than 1.5 nucleated cell count that was 685 but neutrophils were only 1%.  Aug 2022 ct cirrhotic liver and splenomegaly and varices.  I was concerned hepatitis B is no longer under control and your surface antigen is positive with the E antigen positive and E antibody negative and hep B level of 186 million.    hbv 3280 and in May 236 million. He says he had a lapse for a 2 weeks. Hep delta 2022 negative.  The labs from the discharge summary mention that you are taking folic acid 1 mg a day, Lasix 40 mg twice a day, spironolactone 100 mg a day, glimepiride 4 mg a day, lactulose 30 g p.o. 3 times daily, saxagliptin 5 mg a day, and the Vemlidy 25 mg a day.   saw oltx team. They see him in .  As you recall your last visit was back in  and I believe you are actually out of state at the time and did that visit remotely as you are bringing your parents back down with you to [place] I believe. You are to see us in approximately 4 weeks and you are to follow-up with the transplant team. You no showed for your  appointment. Says that his fluid weight when needs taps is only 220 and down from 270 peal. Getting tapped often. To get a tips procedure also he has some pse baseline.  May 28 [place] labs for me after you advsied her of them being done there. Sodium 133 which is slightly low, potassium 4.0,  chloride 101 CO2 27 BUN of 17 creatinine 0.99 glucose 167 albumin was low at 2.6 bilirubin elevated at 4.0 calcium 7.9 which was low AST was 43 slightly up ALT 29 alk phos 66 white blood cell count 4.9 hemoglobin low at 10.1 MCV elevated 93.1 platelet count low at 137.  labs showed sodium 138 potassium 3.8 creatinine 0.88 bilirubin 3.5 AST 48 ALT 17 alk phos 45 hemoglobin 10.1. Unable to calculate meld score.   May 5 [facility] labs sent in from [place]. Glucose 73 BUN 21 creatinine 0.84 sodium 134 which is slightly low potassium 4.1 chloride 101 CO2 of 28 calcium low at 8.2 albumin low at 3.0 bilirubin elevated 2.7 alkaline phosphatase 70 AST 82 ALT 54 INR 1.3.  White cell count 4.6 hemoglobin 10.2 which is low.  Platelet count 129 which is low.  MCV 95.5.  Neutrophils normal at 2608 and lymphocytes 1003. Prior march labs: na 138 and cr 0.8 and alb 3.8 and tb 3.5 and ast 48 and alt 17 and alk 45 and hg 10.1. Meld 13 and meld na16.  Pt re weight lowest 166 in aug 2022  and now 176 and so hanging in there.  Folks transitioned down to [place].  Still on both diuretics: Lasix 40mg po bid. Aldactone 50 mg po bid.  Prior visit he was to 280 and staying 100 pounds less now.   2022 labs when left na 138 and k 3.8 and cl99 and co2 29 and glu 190 and cr 0.88 and alb 3.8 and tv 3.5 and tb 3.5,ast 48 and alt 17 and alk 45 and wbc 3.8 hg 10.1.   tap less than 1.5 alb  and alb less than 3 and nuc 89 and neutrophils 3%   Document Type:	CT Abdomen + Pelvis w/o IV Contrast Document Date:	2022 04:02 EDT Document Status:	Auth (Verified) Document Title:	CT Abdomen + Pelvis w/o IV Contrast Performed By:	Jitendra Braswell  Verified By:	Sandra Dwyer on 2022 04:56 EDT Encounter info:	[id number], [facility], Inpatient, 2022 - 2022  * Final Report *  Reason For Exam Abdominal pain unspecified  REPORT EXAM: CT Abdomen + Pelvis w/o IV Contrast  CLINICAL INDICATION: Abdominal pain unspecified.  TECHNIQUE: Noncontrast images were obtained through the abdomen and pelvis. ESRC.1.7.2  COMPARISON: MRI abdomen for 3/20/2022  FINDINGS:  Lower Thorax: Small left pleural effusion. Moderate hiatal hernia.  Liver: Cirrhotic and shrunken appearance of the hepatic parenchyma, and nodular contour with perihepatic ascites. No focal lesions.  Gallbladder/Biliary Tree: Normal.  Spleen: Redemonstration of splenomegaly.  Pancreas: Normal.  Adrenal Glands: Normal.  Kidneys/Ureters: Normal.  Gastrointestinal: No bowel obstruction. Scattered fecal material within loops of large bowel.   Bladder: Normal.  Prostate/Seminal Vesicles: Normal.  Lymph Nodes: No pathologically enlarged lymphadenopathy.  Vessels: Lack of IV contrast limits evaluation of vascular structures. Aorta is not aneurysmal. Atherosclerotic calcifications of the splenic vessels. Prominent paraumbilical vein.  Peritoneum/Retroperitoneum: Large volume intra-abdominal ascites.  Bones/Soft Tissues: Bilateral gynecomastia. Small fluid containing umbilical hernia. Right greater than left small fat-containing inguinal hernias. Mild lower pelvic soft tissue edema. Multilevel degenerative changes with mild retrolisthesis of L5 on S1. No aggressive soft tissue or osseous lesions.   IMPRESSION:  Cirrhotic hepatic morphology with large volume intra-abdominal ascites, splenomegaly, and portosystemic varices suggestive of underlining portal hypertension.  The images were reviewed and interpreted by Paris Dwyer MD.  Signature Line *** Final ***  Electronically Signed By:  Sandra Dwyer on  2022 04:56    Document Type:	MRI Abdomen w/ + w/o Contrast Document Date:	2022 19:51 EDT Document Status:	Auth (Verified) Document Title:	MRI Abdomen w/ + w/o Contrast Performed By:	Jose Juan Omalley  Verified By:	Jojo Salcedo on 2022 10:32 EDT Encounter info:	[id number], [facility], Inpatient, 3/23/2022 - 2022  * Final Report *  Reason For Exam Liver disease; hcc screen;Liver disease  REPORT EXAM: MRI Abdomen w/ + w/o Contrast  CLINICAL INDICATION: History of hepatitis B cirrhosis with ascites. And esophageal varices status post endoscopic banding on 3/25/2022. Evaluate for HCC.  TECHNIQUE: Multisequence, multiplanar MRI of the abdomen was performed without and with intravenous contrast. ESRC.2.7.3  CONTRAST: 17cc cc of Prohance  COMPARISON: MRI abdomen 1/3/2022  FINDINGS:  Lower Thorax: Small left pleural effusion and left lower lobe atelectasis similar to prior.  Liver: No fat or iron. Cirrhotic liver morphology similar to prior. No lesions.  Gallbladder/Biliary Tree: Mild diffuse gallbladder wall thickening related to third spacing/underlying liver disease. No stones. No biliary ductal dilatation.  Spleen: Unchanged splenomegaly measuring up to 16.5 cm in craniocaudal length. Small curvilinear T2 hypointensity along the lateral subcapsular region compatible with small remote intracapsular bleed.  Pancreas: Normal.  Adrenal Glands: Normal.   Kidneys/Ureters: Normal.  Gastrointestinal: Normal.   Lymph Nodes: No pathologically enlarged lymph nodes.  Vessels: Unchanged large paraesophageal varices. Additionally there are perigastric, perisplenic, and peritoneal/retroperitoneal varices are similar to prior exam. Recanalized paraumbilical vein. No aortic aneurysm. Patent portal vein, splenic vein, hepatic veins, and SMV.  Peritoneum/Retroperitoneum: Large volume ascites similar to prior. Unchanged smooth diffuse peritoneal enhancement without focal nodularity, similar to prior.  Bones/Soft Tissues: Expected marrow signal. No worrisome osseous lesions.  IMPRESSION:     1.  Cirrhotic liver morphology without evidence of hepatocellular carcinoma. 2.  Sequela of portal hypertension, including large volume ascites, splenomegaly, and upper abdominal varices. 3.  Unchanged diffuse mild peritoneal enhancement which could be from chronic peritoneal irritation from multiple prior paracenteses. However, if there is concern for peritoneal infection, recommend fluid sampling.  The images were reviewed and interpreted by Jojo Salcedo MD.  Signature Line *** Final ***  Electronically Signed By:  Jojo Salcedo on  2022 10:32  Dictated by:  Jose Juan Omalley   22- wbc 5.3 normal, rbc 3.18 low with polychromasia, macrocytes and anisocytosis (please share with primary md), hgb 10.3 low (prev 11.1 and 9.9), platelets 76 low (actual platelet count may be slightly higher than reported due to clumping, prev 148), glucose 137 high but improved (prev 310), creatinine 0.88 normal, sodium 134 normal, calcium low at 8 (please share with primary md), albumin is low at 2.9 (but improved from prev of 2.3), total bili 3.7 high (this is up from prev of 2.1), alk phos 141 high (prev 126), ast 163 high (prev 123), alt 104 high (prev 84), hep b dna 9910 (prev 13,270), inr 1.2.  MELD 13, MELD- NA 16.  /- white blood count 5.5 normal, rbc 3.35 low, hemoglobin 11.1 low (prev 11.1), mcv 99 high, platelets 148 low (prev 140), glucose very high at 310 (please follow up with primary md regarding diabetes control), sodium 134 normal,  creatinine 0.99 normal, calcium 8.1 low (please share with primary md), albumin 2.3 low, total bilirubin 2.1 high (improved from 2.8 prev), alk phos 126 high (prev 120), ast 123 high (prev 133), alt 84 high (prev 68), hep b viral load 13,270 (prev 20,890), inr 1.3 (prev 1.1).   MELD 12, MELD-NA 15.   Scheduled for repeat egd 22 with Dr. Estes and he did that in hospital and 2022 grade 3 [id number] and 6 bands and redo in 1m and not done yet as left and do when back.  Referred to cardiology given cardiomegaly and left ventricular dilation seen on CT chest and to see them in april as also cancelled due to insurance.   labs show INR normal at 1.1 and compared to  that is actually lower than 1.2.  Sugar down to 156 from preadmission levels in the 290.  BUN of 16 creatinine 1.11 sodium 135 potassium 4.2 calcium slightly low at 8.4 but up from 7.93 the last admission when it was 7.9.  Albumin still up at 3.3 and previously had been 2.33 that admission.  Total bilirubin still elevated 2.8.  Alkaline phosphatase now normal at 120.   and ALT 68.  He to follow these labs.  Hep B down to 20,890. White blood cell count 5.7 hemoglobin 11.1 which is diminished.  Platelet count 140.  MCV 95.  Neutrophils 3.9 and lymphocytes 1.0.   One looks at your hospital labs as comparison instead of the nov labs: : cr 1.07 and alb 2.7 and tb 3.3 and ast 66 and alt 28.    hg 9.7 and plat 101. Inr 1.57.  HBV 213943 then.   HBV noted and then led to hbv tx and we started the Vemlidy in 2021.  Pt admtited from clinic Dec 28 through  approx and had 3 abd taps (prior had 2 taps pre admit). Varied 5 to 15 liters. IV alb and diuresis and lost 284 to 191.   On lasix 40mg po bid and aldactone 50mg po qd.  HBV level was checked in the hospital.  [id number] eag pos and eab neg. That is coming down from a much higher level at over 890 million.  Document Type:	MRI Abdomen w/ + w/o Contrast Document Date:	2022 18:00 EST Document Status:	Auth (Verified) Document Title:	MRI Abdomen w/ + w/o Contrast Performed By:	Deacon Huang  Verified By:	Deacon Huang on 2022 07:47 EST Encounter info:	[id number], [facility], Inpatient, 2021 - 2022  * Final Report *  Reason For Exam Liver disease  REPORT EXAM: MRI Abdomen w/ + w/o Contrast  CLINICAL INDICATION: Liver disease.  TECHNIQUE: Multisequence, multiplanar MRI of the abdomen was performed without and with intravenous contrast. ESRC.2.7.3  CONTRAST: 20 cc of Prohance  COMPARISON: 12/10/2021.  FINDINGS:  Lower Thorax: Small to moderate-sized left pleural effusion. Nonspecific nodular signal abnormality in the mid right middle and lower lobes, suboptimally evaluated on MRI.  Liver: No fat or iron. Morphologic changes of chronic liver disease including nodular surface contour, lobar redistribution and fissural widening. No definite focal lesion.   Gallbladder/Biliary Tree: Nonspecific mural edema, may be reactive to chronic liver disease and/or third spacing of fluid.  Spleen: Enlarged measuring up to 15.6 cm. Scattered punctate foci of signal dropout on in phase compared to out of phase imaging suggestive of Gamna-Lucia bodies.  Pancreas: Normal.  Adrenal Glands: Normal.   Kidneys/Ureters: Normal.  Gastrointestinal: Circumferential wall thickening with mural edema involving the ascending colon, likely sequelae of portal colopathy.   Lymph Nodes: Normal.  Vessels: Recanalized paraumbilical vein with large paraesophageal, perigastric and perisplenic varices. Patent portal veins.  Peritoneum/Retroperitoneum: Moderate volume abdominopelvic ascites.  Bones/Soft Tissues: Anasarca. No aggressive osseous lesion.  IMPRESSION:     Examination again suboptimal secondary to moderate volume ascites causing dielectric effect artifact.  Morphologic changes of chronic liver disease with sequelae of portal hypertension including moderate volume ascites, splenomegaly and upper abdominal varices. No definite focal hepatic lesion.  Moderate left pleural effusion. Nonspecific signal abnormality in the right middle and lower lobes, suboptimally evaluated on MRI. This can be further evaluated with dedicated CT chest for evaluation of airspace disease as clinically warranted.   Signature Line *** Final ***  Electronically Signed By:  Deacon Huang on  2022 07:47  Dictated by:  Deacon Huang   Chest was also looked at but edema limited for the nodularity and they recommended to redo when more diuresed.   Document Type:	CT Chest w/o Contrast Document Date:	2022 10:22 EST Document Status:	Auth (Verified) Document Title:	CT Chest w/o Contrast Performed By:	Davey Zuniga  Verified By:	Caitlyn Bledsoe on 2022 14:35 EST Encounter info:	[id number], [facility], Inpatient, 2021 - 2022  * Final Report *  Reason For Exam Mediastinal mass  REPORT EXAM: CT Chest w/o Contrast  CLINICAL INDICATION:  Mediastinal mass. MASS  TECHNIQUE: Multiple-row detector helical CT examination of the chest without IV contrast. Axial, sagittal, and coronal reconstructed images.  COMPARISON:  Chest x-ray 2021.    FINDINGS:  Support Devices:  None.    Mediastinum: Cardiomegaly with left ventricular dilation. No pericardial effusion. Slightly low-attenuation of ventricular blood pool which may reflect anemia. Thickened appearance of the mid and distal esophagus.  Lymph nodes: No pathologically enlarged mediastinal, hilar, or axillary lymph nodes.     Airways: Central airways are patent. Mucus plugging within the dependent distal basilar airways.  Lungs/Pleura: Groundglass/consolidative airspace opacities within the bilateral lung bases, left greater than right. Peripheral groundglass opacities within the right middle lobe and along the right minor fissure. Bibasilar atelectasis. No pulmonary edema. Small left pleural effusion. No pneumothorax.    Upper abdomen: Large volume abdominal ascites. Mural gallbladder wall thickening. Splenomegaly. Cirrhotic morphology of the liver. These findings are better characterized on MRI of the abdomen dated 2022. Curvilinear densities face the gastrohepatic ligament likely represent varicosities.  Bones and soft tissues:  Imaged thyroid gland is normal. No acute osseous abnormality multilevel degenerative disc disease with minimal anterior disc osteophytosis. Diffuse anasarca.. Symmetric bilateral gynecomastia    IMPRESSION:   1. Basilar predominant airspace opacities and small left pleural effusion is most consistent with resolving pulmonary edema.  2. The wall of the mid/distal esophagus appears severely thickened. These findings may reflect promt documented sequela of portal hypertension. However, it is difficult to exclude esophageal malignancy and follow-up contrast enhanced CT chest is recommended for further characterization.  3. Peripheral fissural and right middle lobe nodularity. Findings are nonspecific but for further characterization a follow-up CT chest is recommended after resolution of left pleural effusion and when the patient is able to sustain a inspirational breath-hold.  The images were reviewed and interpreted by Caitlyn Bledsoe MD.  He did the egd and banded x 5 and Dr Beauchamp her and need to redo that in 1m. has appt to do with Dr Estes. : Large varices and 5 bands and complete erradication and mild phtn gastropathy.  Needs to be caught up.  Prior visit with [facility]: Dec 28  Pt returns today with complaint of increased abdominal distention, sob, and increased weight.  Currently weighs 284 lbs.  Prior weight 215 (estimate) on 10/18/21, 240 (estimate) on 11/15/21, and  274 21.    MRI 12/10/21 showed large left pleural effusion, has not heard back from pcp in regards to further mgmt of this.  Admits to orthopnea requiring 3-4 pillows.  O2 sat 90% in office.  Presents today in wheelchair due to difficulty with walking secondary to excess weight, swelling in legs, abdomen and SOB.   12/10/21 MRI unfortunately was limited somewhat by your ascites problem. They mention that you had a large layering left pleural effusion with bibasilar atelectasis.  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 They mention he had a shrunken cirrhotic appearing liver there was again limited in its exam but with no obvious lesion but again was limited. The gallbladder and biliary tree showed no discrete abnormality. Spleen was enlarged at 16.5 cm. Pancreas head no gross abnormality.  Neither did the kidneys. Lymph nodes were suboptimally seen but with no gross lymphadenopathy. The portal vein appeared to be grossly patent.  You had a large volume of ascites and some anasarca in your tissues. They recommend given your fluid issues that for the next MRI to do the MRI not too long after after you have a paracentesis done.  That will/could diminish some of the artifact issues that we had this time.   Unfortunately,  not always easy to coordinate these scans as an outpatient with a tap given the limitations of how things are scheduled independently. The hope is that with your hepatitis B being on treatment that the liver labs will cool off as well as some of the factors leading to the speed of this fluid accumulation.  Unfortunately the HBV medicine do take a period of time to drop the HBV levels and inflammation from this particularly if the virus level is higher to drop to start with.  S/p paracentesis with 5L ascites removed on 21, states more fluid remained but told they could only remove 5L max.    Underwent repeat paracentesis on 21 with 13.5 L ascites removed.     IV abx completed end of Oct 2021 for osteomyelitis, cleared by ID but will be on disability for 2 more months per podiatry.     he saw belae dietician re the low sodium diet.  Last a1c 6.5% 21.    21 labs- vitamin b12 1099 normal , folate 3.7 normal, glucose 290 (significant elevated, please share with primary MD), creatinine 0.94 normal, monocytes absolute 1.1 high, sodium 131 low, chloride 93 low, calcium 7.9 low, albumin 2.3 (prev 2.8) low, total bili 2.7 high (prev 3.5 on 21), alk phos 148 elevated (prev 100 21), ast 57 elevated (prev 61 21), alt 26 normal (prev 23 21), hgb 9.9 low (prev 12.2 21), rbc 3.0 low, platelets 146 low (prev 163).  Please share these labs with primary MD.  Would like to repeat labs again in 2 weeks prior to next office visit on .  HBV DNA viral load down to 319 million (from prev 890 million prior to starting vemlidy)  10/18/21- TIBC slightly low at 212, iron sat normal at 33, igg 2396 elevated, igm 141, ferritin 403 elevated, hep be ag positive, hep be ab negative, asma negative (prev positive), ama negative, alpha 1 232 elevated but normal phenotype MM, glucose 249 elevated (please share with primary MD), creatinine 1.12 (prev 1.54), potassium low at 3.1 (please share with primary MD as likely secondary to lasix and they may want to adjust potassium supplment dosage or consider adding spironolactone to help with low potassium and fluid retention), albumin low at 3.0 but improved from prior of 2.3, total bili 3.3 elevated, alk phos 81 (prev 69), ast 45 elevated, alt 20, alyssa negative, wbc 6.6, hgb 10.8 low but up from prev 9.0, mcv high at 99, mch 33.8 elevated, rbc 3.2 (please share with primary md), plt 142 low (increased from 92 on 21), inr 1.2, hep b core ab positive, hep a ab total negative (recommend getting hep a vaccine as not immune), hep b surf ag positive.   RECAP Of Pre visit hx: He has a PMHx of DM2 (last a1c 7.5% on glimepiride and janumet), hx of right great toe infection 2021, and presented to the ED on 21 with erythema/pain/drainage from right 4th toe. He was recently diagnosed with OM of right 4th digit on 9/10/21 and is s/p 4th digit partial metatarsal resection by Dr. Mathew. Patient did not follow up with ID outpatient for antibiotics following 9/10 surgery. Patient started on Vanc/Zosyn in ED on   He underwent further surgery by Dr. Mathew on 2021 including right partial 4th metatarsal resection with wound debridement and application of wound VAC, right excisional debridement of the hallux plantar ulcer down to subcutaneous tissues on .   While admitted  he was diagnosed with hepatitis B, ultrasound of the liver was concerning for possible liver cirrhosis and ascites and recommended to follow up outpatient with GI.   Also noted to have ANGELINA that was improving.  Discharged on  with IV Zosyn x6wks at outpatient infusion center (Dr. Behari) with wound vac f/u at Dr Mathew's clinic (podiatry).    Pt notes hx of PARDO/fatty liver disease x 10-15 yrs and treated with diet and exercise and monitoring of labs q6m with pcp. Has not seen hepatology for it.  Was noted to have jaundice during admission with T bili 4.0, direct bili 0.7, indirect bili 3.3. Denies heavy etoh use. No hx of iv drug use. Previously just had labs checked with his pcp every 6 months.  No prior egd. Colon 2-3 yrs ago with no polyps  He has had chronic thrombocytopenia since 2017 (platelets in 130's) and as low as 76 on 21.  Platelets 142 on admission () and 101 ()  Ascites first noted during sept hospitalization.    He believes he contracted HBV from his now ex-wife who notified him that she had been exposed to HBV after marital infidelity.  He  in 2021.  Believes last exposure about 2021.  Labs 2021: WBC 6.7, hemoglobin 9.0, MCV 96.7, platelets low 92, sodium 137, potassium 3.3 low, creatinine 1.54 high, calcium 7.9 low.  Labs 2021: HIV negative  Labs 2021: Hepatitis B viral  million, hepatitis B E antibody nonreactive  Labs 2021: Sodium 137 glucose 162, albumin low at 2.3, T bili 2.3 high, AST 69 high, ALT 31, alk phos 69, platelets 109 low, hemoglobin 9.7 low  Labs 2021: INR 1.39, PT 16.4, ASMA positive 1:40, hep B surface antigen positive, creatinine 1.65, alpha-1 antitrypsin 168, hepatitis A antibody IgM nonreactive, hep B core antibody IgM nonreactive, hep C antibody nonreactive, ceruloplasmin 32, AMA negative, AFP 1.1, vitamin D 11 low  Labs 2021: T bili 3.2, AST 63, ALT 32, alk phos 70, creatinine 1.75, total protein 6.0, albumin 2.3, sodium 136  Labs 2021: T bili 4.0, direct bili 0.7, indirect bili 3.3, A1c 7.5, COVID-19 negative   () GB US: FINDINGS: The pancreas is poorly visualized due to technical factors and overlying bowel gas. Liver and gallbladder are not well assessed due to poor acoustic windows. Increased liver echogenicity is suggested. Gallbladder is nonvisualized. There is a negative sonographic Cameron sign. The common bile duct measures 4 mm . The right kidney measures 12.7 cm in greatest longitudinal dimension. No right-sided hydronephrosis. There is a small to moderate amount of ascites demonstrated within the upper abdomen. Liver demonstrates a slightly nodular contour.   IMPRESSION: Summation limited by poor acoustic windows. The gallbladder was not visualized. Liver was not well assessed. Slightly nodular contour the liver suggests which may reflect underlying cirrhosis. Correlate with clinical findings. Diffuse mild increased liver echogenicity which can be seen with diffuse hepatic steatosis versus diffuse infiltration. Correlate with hepatic enzymes. Ascites.   Plan: 1. Need labs to see how hep b is doing. 2. Plan for him to stay on meds., 3. Need to get oltx team to see you. 4. Ir seeing him today also remote. 5. Need to follow course. 6. RTC in 3m.   Stressed to pt the need for social distancing and strict handwashing and wearing a mask and to follow any other new or added CDC recommendations as this is an evolving target.  Duration of the visit was 42  minutes with 20 minutes of chart prep reviewing [facility] oltx notes and tests and scans and labs and then 22 min via [facility]  for the TeleMed visit reviewing recent records current status and future plans for the patient.

## 2022-12-05 NOTE — EXAM-PHYSICAL EXAM
Telemed self reported:  Gen: no distress. Eyes: no jaundice. Mouth: no thrush. CV: no chest pain. Resp: no wheezes. Abd: no pain. Has some mild ascites and says is better. Ext: some edema.	 Neuro: no weakness.
sore throat

## 2022-12-12 ENCOUNTER — ERX REFILL RESPONSE (OUTPATIENT)
Dept: URBAN - METROPOLITAN AREA CLINIC 86 | Facility: CLINIC | Age: 52
End: 2022-12-12

## 2022-12-12 RX ORDER — TENOFOVIR ALAFENAMIDE 25 MG/1
TAKE 1 TABLET BY MOUTH ONCE DAILY WITH FOOD TABLET ORAL
Qty: 30 TABLET | Refills: 1 | OUTPATIENT

## 2022-12-12 RX ORDER — TENOFOVIR ALAFENAMIDE 25 MG/1
TAKE 1 TABLET BY MOUTH ONCE DAILY WITH FOOD TABLET ORAL
Qty: 30 | Refills: 3 | OUTPATIENT

## 2022-12-31 ENCOUNTER — TELEPHONE ENCOUNTER (OUTPATIENT)
Dept: URBAN - METROPOLITAN AREA CLINIC 92 | Facility: CLINIC | Age: 52
End: 2022-12-31

## 2022-12-31 RX ORDER — FOLIC ACID 1 MG/1
TAKE 1 TABLET BY MOUTH EVERY DAY TABLET ORAL
Qty: 90 | Refills: 0

## 2023-01-06 ENCOUNTER — TELEPHONE ENCOUNTER (OUTPATIENT)
Dept: URBAN - METROPOLITAN AREA CLINIC 86 | Facility: CLINIC | Age: 53
End: 2023-01-06

## 2023-01-06 RX ORDER — SPIRONOLACTONE 50 MG/1
1 TABLET TABLET ORAL TWICE A DAY
Qty: 180 TABLET | Refills: 0

## 2023-01-06 RX ORDER — RIFAXIMIN 550 MG/1
1 TABLET TABLET ORAL TWICE A DAY
Qty: 180 TABLET | Refills: 0

## 2023-01-06 RX ORDER — LACTULOSE 10 G/15ML
30 ML SOLUTION ORAL
Qty: 5400 ML | Refills: 0

## 2023-01-06 RX ORDER — FUROSEMIDE 40 MG/1
TAKE 1 TABLET BY MOUTH TWICE A DAY TABLET ORAL
Qty: 180 | Refills: 0

## 2023-03-05 ENCOUNTER — LAB OUTSIDE AN ENCOUNTER (OUTPATIENT)
Dept: URBAN - METROPOLITAN AREA TELEHEALTH 2 | Facility: TELEHEALTH | Age: 53
End: 2023-03-05

## 2023-03-06 ENCOUNTER — OFFICE VISIT (OUTPATIENT)
Dept: URBAN - METROPOLITAN AREA TELEHEALTH 2 | Facility: TELEHEALTH | Age: 53
End: 2023-03-06
Payer: COMMERCIAL

## 2023-03-06 ENCOUNTER — TELEPHONE ENCOUNTER (OUTPATIENT)
Dept: URBAN - METROPOLITAN AREA CLINIC 92 | Facility: CLINIC | Age: 53
End: 2023-03-06

## 2023-03-06 VITALS — WEIGHT: 192 LBS | BODY MASS INDEX: 26.01 KG/M2 | HEIGHT: 72 IN

## 2023-03-06 DIAGNOSIS — K74.69 OTHER CIRRHOSIS OF LIVER: ICD-10-CM

## 2023-03-06 DIAGNOSIS — R18.8 ASCITES OF LIVER: ICD-10-CM

## 2023-03-06 DIAGNOSIS — K75.81 NASH (NONALCOHOLIC STEATOHEPATITIS): ICD-10-CM

## 2023-03-06 DIAGNOSIS — B19.10 HEPATITIS B INFECTION WITHOUT DELTA AGENT WITHOUT HEPATIC COMA, UNSPECIFIED CHRONICITY: ICD-10-CM

## 2023-03-06 PROBLEM — 8186001: Status: ACTIVE | Noted: 2022-01-25

## 2023-03-06 PROBLEM — 442685003: Status: ACTIVE | Noted: 2021-10-18

## 2023-03-06 PROBLEM — 14223005 ESOPHAGEAL VARICES WITHOUT BLEEDING: Status: ACTIVE | Noted: 2022-01-14

## 2023-03-06 PROBLEM — 14350001000004108: Status: ACTIVE | Noted: 2021-10-20

## 2023-03-06 PROBLEM — 168501001 RADIOLOGY RESULT ABNORMAL: Status: ACTIVE | Noted: 2022-01-14

## 2023-03-06 PROBLEM — 102572006: Status: ACTIVE | Noted: 2021-10-20

## 2023-03-06 PROBLEM — 1077821000119100: Status: ACTIVE | Noted: 2021-10-20

## 2023-03-06 PROBLEM — 27503000: Status: ACTIVE | Noted: 2021-10-20

## 2023-03-06 PROBLEM — 66071002: Status: ACTIVE | Noted: 2021-10-18

## 2023-03-06 PROBLEM — 443913008: Status: ACTIVE | Noted: 2021-10-18

## 2023-03-06 PROBLEM — 44054006: Status: ACTIVE | Noted: 2021-10-20

## 2023-03-06 PROBLEM — 19943007: Status: ACTIVE | Noted: 2021-10-20

## 2023-03-06 PROBLEM — 453861000124107: Status: ACTIVE | Noted: 2021-10-20

## 2023-03-06 PROBLEM — 60046008: Status: ACTIVE | Noted: 2021-12-28

## 2023-03-06 PROBLEM — 236004002: Status: ACTIVE | Noted: 2021-10-18

## 2023-03-06 PROBLEM — 166643006: Status: ACTIVE | Noted: 2021-10-18

## 2023-03-06 PROBLEM — 441781003: Status: ACTIVE | Noted: 2021-10-18

## 2023-03-06 PROCEDURE — 99215 OFFICE O/P EST HI 40 MIN: CPT

## 2023-03-06 RX ORDER — RIFAXIMIN 550 MG/1
1 TABLET TABLET ORAL TWICE A DAY
Qty: 180 TABLET | Refills: 0 | Status: ACTIVE | COMMUNITY

## 2023-03-06 RX ORDER — FOLIC ACID 1 MG/1
TAKE 1 TABLET BY MOUTH EVERY DAY TABLET ORAL
Qty: 90 | Refills: 0 | Status: ACTIVE | COMMUNITY

## 2023-03-06 RX ORDER — FUROSEMIDE 40 MG/1
TAKE 1 TABLET BY MOUTH TWICE A DAY TABLET ORAL
Qty: 180 | Refills: 0 | Status: ACTIVE | COMMUNITY

## 2023-03-06 RX ORDER — GLIMEPIRIDE 4 MG/1
1 TABLET WITH BREAKFAST OR THE FIRST MAIN MEAL OF THE DAY TABLET ORAL ONCE A DAY
Status: ACTIVE | COMMUNITY

## 2023-03-06 RX ORDER — SPIRONOLACTONE 50 MG/1
1 TABLET TABLET ORAL TWICE A DAY
Qty: 180 TABLET | Refills: 0 | Status: ACTIVE | COMMUNITY

## 2023-03-06 RX ORDER — LACTULOSE 10 G/15ML
30 ML SOLUTION ORAL
Qty: 5400 ML | Refills: 0 | Status: ACTIVE | COMMUNITY

## 2023-03-06 RX ORDER — SAXAGLIPTIN 5 MG/1
1 TABLET TABLET, FILM COATED ORAL ONCE A DAY
Status: ACTIVE | COMMUNITY

## 2023-03-06 RX ORDER — TENOFOVIR ALAFENAMIDE 25 MG/1
TAKE 1 TABLET BY MOUTH ONCE DAILY WITH FOOD TABLET ORAL
Qty: 30 TABLET | Refills: 1 | Status: ACTIVE | COMMUNITY

## 2023-03-06 NOTE — HPI-TODAY'S VISIT:
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  A copy of this note will be sent to the referring provider.  Did [facility] and [facility]. He has not moved down there but taking parents [place] and so he still lives.  Post tips he has done better.  He was worried for one leg and went to [facility] and did egd and did was negative.  Had some fluid removed and had one in Nov an 2023.  [facility] did a tap when colonoscopy Feb 15.  He says meld score when he spoke at 17.  Pt had tips done through [facility] in Oct 2022 and he says that he has needed one tap after the tips and none since.  Main issue for oltx and he says mental status has been doing well.  He is still on the vemlidy every day.  2022 na 134 and k 4.2 and cl 105 and co2 24 and ca 7.5 and bun 12 and cr 0.86 and glu 110. Tp 5.4 and alb 2.1 and alk 75 and ast 114 and alt 94 and tb 4.9 and wbc 7.5 and hg 10.2 and hct 29.3 and mcv 95.8 and plat 112. Ammonia 84.  Re imaging he needs  colon .   DO week .  Doppler 2022 : EXAM: US ABDOMEN DOPPLER COMPLETE   CLINICAL INDICATION: cirrhosis, S/P TIPS.   TECHNIQUE: Grayscale, pulsed wave and color Doppler sonography of the upper abdomen were performed. ESRC.3.7.1   COMPARISON: 2022   FINDINGS:  Liver: Cirrhotic liver morphology. No lesions.   Bile Ducts: No dilated intrahepatic biliary radicles. Common duct measures 4 mm.   Gallbladder: Poorly visualized, wall appears thickened where visualized. Cameron's sign is negative.   Pancreas: Unremarkable where visualized.   Right Kidney: Measures 10.2 cm. Normal echogenicity. No hydronephrosis.   Left Kidney: Measures 11.0 cm. Normal echogenicity. No hydronephrosis.   Spleen: Measures 16.2 cm.   Aorta: Normal caliber where imaged.   IVC: Normal proximally, not imaged distally.    Hepatic Veins: Patent.   Portal Veins: Hepatopetal flow. 32 cm/s.   TIPS: Patent: Portal venous end 144 cm/s, midportion 180 cm/s, hepatic venous end, 119 cm/s.   Hepatic Arteries: Peak systolic velocity 92 cm/s. Resistive index 0.74.   Other: Ascites.   IMPRESSION:   1. Patent TIPS. 2. Cirrhotic liver. 3. Ascites. 4. Splenomegaly.   2022 tap: Patient Name:  LIZY SOMMERS       : 1970    SEX: Male Ordering Physician:  NAKIA ARAIZA Location:  [facility].IR.1   Indication: Patient with history of cirrhosis and recurrent ascites. Paracentesis requested.   Procedure:  Ultrasound-guided paracentesis   Findings:  1.   Successful removal of 7500 mL of abdominal fluid.   IMPRESSION: Impression:  Successful ultrasound-guided paracentesis.  Oct 31 2022 tips Narrative & Impression Patient Name:  LIZY SOMMERS       : 1970    SEX: Male Ordering Physician:  RILEY JIMENES Location:  [facility].IR.1   Date of exam:  10/31/2022    Clinical Indication:  Refractory ascites   Procedure:    1.  Primary TIPSS 2.  Embolization of gastroesophageal varices. 3.  Paracentesis 4.  Triple lumen central venous catheter placement with ultrasound and fluoroscopic guidance   Findings:    1.  Right internal jugular vein is ultrasonographically patent and compresses.  Needle entry was documented. 2.  Right hepatic venogram confirms vein patency. 3.  Portogram demonstrates patent portal veins with a large left gastric vein varices. 4.  The initial portosystemic gradient is 13 mmHg. 5.  Post-TIPSS portosystemic gradient is 7 mmHg. 6.  The TIPSS has a smooth course and extends from the right hepatic vein to right portal vein. 7.  There is good flow through the shunt at the conclusion of the procedure with minimal appreciable filling of varices. 8.  3.5 liters of fluid were drained during the procedure. 9.  New right internal jugular approach triple-lumen catheter terminates at the superior cavoatrial junction.   IMPRESSION: Impression:    1.  Successful creation of TIPS from right hepatic vein to right portal vein with a 10 mm x 8 cm x 2 cm controlled-expansion Viatorr stent (ballooned to 10 mm).  2.  The portosystemic gradient was reduced from 13 mm Hg to 7 mmHg.  10-4 13 liter tap 8-16 9.5 liter tap  Aug 2022 CT EXAM: CT Abdomen + Pelvis w/o IV Contrast CLINICAL INDICATION: Abdominal pain unspecified. TECHNIQUE: Noncontrast images were obtained through the abdomen and pelvis. ESRC.1.7.2 COMPARISON: MRI abdomen for 3/20/2022 FINDINGS: Lower Thorax: Small left pleural effusion. Moderate hiatal hernia. Liver: Cirrhotic and shrunken appearance of the hepatic parenchyma, and nodular contour with perihepatic ascites. No focal lesions. Gallbladder/Biliary Tree: Normal. Spleen: Redemonstration of splenomegaly. Pancreas: Normal. Adrenal Glands: Normal. Kidneys/Ureters: Normal. Gastrointestinal: No bowel obstruction. Scattered fecal material within loops of large bowel. Bladder: Normal. Prostate/Seminal Vesicles: Normal. Lymph Nodes: No pathologically enlarged lymphadenopathy. Vessels: Lack of IV contrast limits evaluation of vascular structures. Aorta is not aneurysmal. Atherosclerotic calcifications of the splenic vessels. Prominent paraumbilical vein. Peritoneum/Retroperitoneum: Large volume intra-abdominal ascites. Bones/Soft Tissues: Bilateral gynecomastia. Small fluid containing umbilical hernia. Right greater than left small fat-containing inguinal hernias. Mild lower pelvic soft tissue edema. Multilevel degenerative changes with mild retrolisthesis of L5 on S1. No aggressive soft tissue or osseous lesions. IMPRESSION: Cirrhotic hepatic morphology with large volume intra-abdominal ascites, splenomegaly, and portosystemic varices suggestive of underlining portal hypertension. The images were reviewed and interpreted by Paris Dwyer MD. *** Final ***   Electronically Signed By:  Sandra Dwyer on  2022 04:56   Dictated by:  Jitendra Braswell    Recent taps: paracentesis on 2022 with 7.5 L removed. Oct 31 3.5 l Prior he had a 13 L tap done on 2022.  He also had a 9.5 L tap done on .  He had a 16 L tap done on May 27.  He was to do a  colonoscopy scheduled at [facility] with  but issues and ride could not bring him. Redoing in .  Prior: He ran out of the vemlidy and he says no more lapses.  Aug 16 on you that included a sodium of 133 potassium 3.8 BUN of 25 creatinine 0.73 glucose of 111 albumin 2.7 total bilirubin up to 2.5 AST 79 ALT 74 and alkaline phosphatase 57. August 15 labs showed white blood cell count 4 hemoglobin 9.5 platelet count 101 and MCV 93.4 and INR 1.41.  He last had a paracentesis done  that showed low albumin of less than 1.5 nucleated cell count that was 685 but neutrophils were only 1%.  Aug 2022 ct cirrhotic liver and splenomegaly and varices.  I was concerned hepatitis B is no longer under control and your surface antigen is positive with the E antigen positive and E antibody negative and hep B level of 186 million.    hbv 3280 and in May 236 million. He says he had a lapse for a 2 weeks. Hep delta 2022 negative.  The labs from the discharge summary mention that you are taking folic acid 1 mg a day, Lasix 40 mg twice a day, spironolactone 100 mg a day, glimepiride 4 mg a day, lactulose 30 g p.o. 3 times daily, saxagliptin 5 mg a day, and the Vemlidy 25 mg a day.   saw oltx team. They see him in .  As you recall your last visit was back in  and I believe you are actually out of state at the time and did that visit remotely as you are bringing your parents back down with you to [place] I believe. You are to see us in approximately 4 weeks and you are to follow-up with the transplant team. You no showed for your  appointment. Says that his fluid weight when needs taps is only 220 and down from 270 peal. Getting tapped often. To get a tips procedure also he has some pse baseline.  May 28 [facility] labs for me after you advsied her of them being done there. Sodium 133 which is slightly low, potassium 4.0,  chloride 101 CO2 27 BUN of 17 creatinine 0.99 glucose 167 albumin was low at 2.6 bilirubin elevated at 4.0 calcium 7.9 which was low AST was 43 slightly up ALT 29 alk phos 66 white blood cell count 4.9 hemoglobin low at 10.1 MCV elevated 93.1 platelet count low at 137.  labs showed sodium 138 potassium 3.8 creatinine 0.88 bilirubin 3.5 AST 48 ALT 17 alk phos 45 hemoglobin 10.1. Unable to calculate meld score.   May 5 [facility] labs sent in from [place]. Glucose 73 BUN 21 creatinine 0.84 sodium 134 which is slightly low potassium 4.1 chloride 101 CO2 of 28 calcium low at 8.2 albumin low at 3.0 bilirubin elevated 2.7 alkaline phosphatase 70 AST 82 ALT 54 INR 1.3.  White cell count 4.6 hemoglobin 10.2 which is low.  Platelet count 129 which is low.  MCV 95.5.  Neutrophils normal at 2608 and lymphocytes 1003. Prior march labs: na 138 and cr 0.8 and alb 3.8 and tb 3.5 and ast 48 and alt 17 and alk 45 and hg 10.1. Meld 13 and meld na16.  Pt re weight lowest 166 in aug 2022  and now 176 and so hanging in there.  Folks transitioned down to [place].  Still on both diuretics: Lasix 40mg po bid. Aldactone 50 mg po bid.  Prior visit he was to 280 and staying 100 pounds less now.   2022 labs when left na 138 and k 3.8 and cl99 and co2 29 and glu 190 and cr 0.88 and alb 3.8 and tv 3.5 and tb 3.5,ast 48 and alt 17 and alk 45 and wbc 3.8 hg 10.1.   tap less than 1.5 alb  and alb less than 3 and nuc 89 and neutrophils 3%   Document Type:	CT Abdomen + Pelvis w/o IV Contrast Document Date:	2022 04:02 EDT Document Status:	Auth (Verified) Document Title:	CT Abdomen + Pelvis w/o IV Contrast Performed By:	Jitendra Braswell  Verified By:	Sandra Dwyer on 2022 04:56 EDT Encounter info:	[id number], [facility], Inpatient, 2022 - 2022  * Final Report *  Reason For Exam Abdominal pain unspecified  REPORT EXAM: CT Abdomen + Pelvis w/o IV Contrast  CLINICAL INDICATION: Abdominal pain unspecified.  TECHNIQUE: Noncontrast images were obtained through the abdomen and pelvis. ESRC.1.7.2  COMPARISON: MRI abdomen for 3/20/2022  FINDINGS:  Lower Thorax: Small left pleural effusion. Moderate hiatal hernia.  Liver: Cirrhotic and shrunken appearance of the hepatic parenchyma, and nodular contour with perihepatic ascites. No focal lesions.  Gallbladder/Biliary Tree: Normal.  Spleen: Redemonstration of splenomegaly.  Pancreas: Normal.  Adrenal Glands: Normal.  Kidneys/Ureters: Normal.  Gastrointestinal: No bowel obstruction. Scattered fecal material within loops of large bowel.   Bladder: Normal.  Prostate/Seminal Vesicles: Normal.  Lymph Nodes: No pathologically enlarged lymphadenopathy.  Vessels: Lack of IV contrast limits evaluation of vascular structures. Aorta is not aneurysmal. Atherosclerotic calcifications of the splenic vessels. Prominent paraumbilical vein.  Peritoneum/Retroperitoneum: Large volume intra-abdominal ascites.  Bones/Soft Tissues: Bilateral gynecomastia. Small fluid containing umbilical hernia. Right greater than left small fat-containing inguinal hernias. Mild lower pelvic soft tissue edema. Multilevel degenerative changes with mild retrolisthesis of L5 on S1. No aggressive soft tissue or osseous lesions.   IMPRESSION:  Cirrhotic hepatic morphology with large volume intra-abdominal ascites, splenomegaly, and portosystemic varices suggestive of underlining portal hypertension.  The images were reviewed and interpreted by Paris Dwyer MD.  Signature Line *** Final ***  Electronically Signed By:  Sandra Dwyer on  2022 04:56    Document Type:	MRI Abdomen w/ + w/o Contrast Document Date:	2022 19:51 EDT Document Status:	Auth (Verified) Document Title:	MRI Abdomen w/ + w/o Contrast Performed By:	Jose Juan Omalley  Verified By:	Jojo Salcedo on 2022 10:32 EDT Encounter info:	[id number], [facility], Inpatient, 3/23/2022 - 2022  * Final Report *  Reason For Exam Liver disease; hcc screen;Liver disease  REPORT EXAM: MRI Abdomen w/ + w/o Contrast  CLINICAL INDICATION: History of hepatitis B cirrhosis with ascites. And esophageal varices status post endoscopic banding on 3/25/2022. Evaluate for HCC.  TECHNIQUE: Multisequence, multiplanar MRI of the abdomen was performed without and with intravenous contrast. ESRC.2.7.3  CONTRAST: 17cc cc of Prohance  COMPARISON: MRI abdomen 1/3/2022  FINDINGS:  Lower Thorax: Small left pleural effusion and left lower lobe atelectasis similar to prior.  Liver: No fat or iron. Cirrhotic liver morphology similar to prior. No lesions.  Gallbladder/Biliary Tree: Mild diffuse gallbladder wall thickening related to third spacing/underlying liver disease. No stones. No biliary ductal dilatation.  Spleen: Unchanged splenomegaly measuring up to 16.5 cm in craniocaudal length. Small curvilinear T2 hypointensity along the lateral subcapsular region compatible with small remote intracapsular bleed.  Pancreas: Normal.  Adrenal Glands: Normal.   Kidneys/Ureters: Normal.  Gastrointestinal: Normal.   Lymph Nodes: No pathologically enlarged lymph nodes.  Vessels: Unchanged large paraesophageal varices. Additionally there are perigastric, perisplenic, and peritoneal/retroperitoneal varices are similar to prior exam. Recanalized paraumbilical vein. No aortic aneurysm. Patent portal vein, splenic vein, hepatic veins, and SMV.  Peritoneum/Retroperitoneum: Large volume ascites similar to prior. Unchanged smooth diffuse peritoneal enhancement without focal nodularity, similar to prior.  Bones/Soft Tissues: Expected marrow signal. No worrisome osseous lesions.  IMPRESSION:     1.  Cirrhotic liver morphology without evidence of hepatocellular carcinoma. 2.  Sequela of portal hypertension, including large volume ascites, splenomegaly, and upper abdominal varices. 3.  Unchanged diffuse mild peritoneal enhancement which could be from chronic peritoneal irritation from multiple prior paracenteses. However, if there is concern for peritoneal infection, recommend fluid sampling.  The images were reviewed and interpreted by Jojo Salcedo MD.  Signature Line *** Final ***  Electronically Signed By:  Jojo Salcedo on  2022 10:32  Dictated by:  Jose Juan Omalley   22- wbc 5.3 normal, rbc 3.18 low with polychromasia, macrocytes and anisocytosis (please share with primary md), hgb 10.3 low (prev 11.1 and 9.9), platelets 76 low (actual platelet count may be slightly higher than reported due to clumping, prev 148), glucose 137 high but improved (prev 310), creatinine 0.88 normal, sodium 134 normal, calcium low at 8 (please share with primary md), albumin is low at 2.9 (but improved from prev of 2.3), total bili 3.7 high (this is up from prev of 2.1), alk phos 141 high (prev 126), ast 163 high (prev 123), alt 104 high (prev 84), hep b dna 9910 (prev 13,270), inr 1.2.  MELD 13, MELD- NA 16.  22- white blood count 5.5 normal, rbc 3.35 low, hemoglobin 11.1 low (prev 11.1), mcv 99 high, platelets 148 low (prev 140), glucose very high at 310 (please follow up with primary md regarding diabetes control), sodium 134 normal,  creatinine 0.99 normal, calcium 8.1 low (please share with primary md), albumin 2.3 low, total bilirubin 2.1 high (improved from 2.8 prev), alk phos 126 high (prev 120), ast 123 high (prev 133), alt 84 high (prev 68), hep b viral load 13,270 (prev 20,890), inr 1.3 (prev 1.1).   MELD 12, MELD-NA 15.   Scheduled for repeat egd 22 with Dr. Estes and he did that in hospital and 2022 grade 3 [id number] and 6 bands and redo in 1m and not done yet as left and do when back.  Referred to cardiology given cardiomegaly and left ventricular dilation seen on CT chest and to see them in april as also cancelled due to insurance.   labs show INR normal at 1.1 and compared to  that is actually lower than 1.2.  Sugar down to 156 from preadmission levels in the 290.  BUN of 16 creatinine 1.11 sodium 135 potassium 4.2 calcium slightly low at 8.4 but up from 7.93 the last admission when it was 7.9.  Albumin still up at 3.3 and previously had been 2.33 that admission.  Total bilirubin still elevated 2.8.  Alkaline phosphatase now normal at 120.   and ALT 68.  He to follow these labs.  Hep B down to 20,890. White blood cell count 5.7 hemoglobin 11.1 which is diminished.  Platelet count 140.  MCV 95.  Neutrophils 3.9 and lymphocytes 1.0.   One looks at your hospital labs as comparison instead of the nov labs: : cr 1.07 and alb 2.7 and tb 3.3 and ast 66 and alt 28.    hg 9.7 and plat 101. Inr 1.57.  HBV 229018 then.   HBV noted and then led to hbv tx and we started the Vemlidy in 2021.  Pt admtited from clinic Dec 28 through  approx and had 3 abd taps (prior had 2 taps pre admit). Varied 5 to 15 liters. IV alb and diuresis and lost 284 to 191.   On lasix 40mg po bid and aldactone 50mg po qd.  HBV level was checked in the hospital.  [id number] eag pos and eab neg. That is coming down from a much higher level at over 890 million.  Document Type:	MRI Abdomen w/ + w/o Contrast Document Date:	2022 18:00 EST Document Status:	Auth (Verified) Document Title:	MRI Abdomen w/ + w/o Contrast Performed By:	Deacon Huang  Verified By:	Deacon Huang on 2022 07:47 EST Encounter info:	[id number], [facility], Inpatient, 2021 - 2022  * Final Report *  Reason For Exam Liver disease  REPORT EXAM: MRI Abdomen w/ + w/o Contrast  CLINICAL INDICATION: Liver disease.  TECHNIQUE: Multisequence, multiplanar MRI of the abdomen was performed without and with intravenous contrast. ESRC.2.7.3  CONTRAST: 20 cc of Prohance  COMPARISON: 12/10/2021.  FINDINGS:  Lower Thorax: Small to moderate-sized left pleural effusion. Nonspecific nodular signal abnormality in the mid right middle and lower lobes, suboptimally evaluated on MRI.  Liver: No fat or iron. Morphologic changes of chronic liver disease including nodular surface contour, lobar redistribution and fissural widening. No definite focal lesion.   Gallbladder/Biliary Tree: Nonspecific mural edema, may be reactive to chronic liver disease and/or third spacing of fluid.  Spleen: Enlarged measuring up to 15.6 cm. Scattered punctate foci of signal dropout on in phase compared to out of phase imaging suggestive of Gamna-Lucia bodies.  Pancreas: Normal.  Adrenal Glands: Normal.   Kidneys/Ureters: Normal.  Gastrointestinal: Circumferential wall thickening with mural edema involving the ascending colon, likely sequelae of portal colopathy.   Lymph Nodes: Normal.  Vessels: Recanalized paraumbilical vein with large paraesophageal, perigastric and perisplenic varices. Patent portal veins.  Peritoneum/Retroperitoneum: Moderate volume abdominopelvic ascites.  Bones/Soft Tissues: Anasarca. No aggressive osseous lesion.  IMPRESSION:     Examination again suboptimal secondary to moderate volume ascites causing dielectric effect artifact.  Morphologic changes of chronic liver disease with sequelae of portal hypertension including moderate volume ascites, splenomegaly and upper abdominal varices. No definite focal hepatic lesion.  Moderate left pleural effusion. Nonspecific signal abnormality in the right middle and lower lobes, suboptimally evaluated on MRI. This can be further evaluated with dedicated CT chest for evaluation of airspace disease as clinically warranted.   Signature Line *** Final ***  Electronically Signed By:  Deacon Huang on  2022 07:47  Dictated by:  Deacon Huang   Chest was also looked at but edema limited for the nodularity and they recommended to redo when more diuresed.   Document Type:	CT Chest w/o Contrast Document Date:	2022 10:22 EST Document Status:	Auth (Verified) Document Title:	CT Chest w/o Contrast Performed By:	Davey Zuniga  Verified By:	Caitlyn Bledsoe on 2022 14:35 EST Encounter info:	[id number], [facility], Inpatient, 2021 - 2022  * Final Report *  Reason For Exam Mediastinal mass  REPORT EXAM: CT Chest w/o Contrast  CLINICAL INDICATION:  Mediastinal mass. MASS  TECHNIQUE: Multiple-row detector helical CT examination of the chest without IV contrast. Axial, sagittal, and coronal reconstructed images.  COMPARISON:  Chest x-ray 2021.    FINDINGS:  Support Devices:  None.    Mediastinum: Cardiomegaly with left ventricular dilation. No pericardial effusion. Slightly low-attenuation of ventricular blood pool which may reflect anemia. Thickened appearance of the mid and distal esophagus.  Lymph nodes: No pathologically enlarged mediastinal, hilar, or axillary lymph nodes.     Airways: Central airways are patent. Mucus plugging within the dependent distal basilar airways.  Lungs/Pleura: Groundglass/consolidative airspace opacities within the bilateral lung bases, left greater than right. Peripheral groundglass opacities within the right middle lobe and along the right minor fissure. Bibasilar atelectasis. No pulmonary edema. Small left pleural effusion. No pneumothorax.    Upper abdomen: Large volume abdominal ascites. Mural gallbladder wall thickening. Splenomegaly. Cirrhotic morphology of the liver. These findings are better characterized on MRI of the abdomen dated 2022. Curvilinear densities face the gastrohepatic ligament likely represent varicosities.  Bones and soft tissues:  Imaged thyroid gland is normal. No acute osseous abnormality multilevel degenerative disc disease with minimal anterior disc osteophytosis. Diffuse anasarca.. Symmetric bilateral gynecomastia    IMPRESSION:   1. Basilar predominant airspace opacities and small left pleural effusion is most consistent with resolving pulmonary edema.  2. The wall of the mid/distal esophagus appears severely thickened. These findings may reflect promt documented sequela of portal hypertension. However, it is difficult to exclude esophageal malignancy and follow-up contrast enhanced CT chest is recommended for further characterization.  3. Peripheral fissural and right middle lobe nodularity. Findings are nonspecific but for further characterization a follow-up CT chest is recommended after resolution of left pleural effusion and when the patient is able to sustain a inspirational breath-hold.  The images were reviewed and interpreted by Caitlyn Bledsoe MD.  He did the egd and banded x 5 and Dr Beauchamp her and need to redo that in 1m. has appt to do with Dr Estes. : Large varices and 5 bands and complete erradication and mild phtn gastropathy.  Needs to be caught up.  Prior visit with [facility]: Dec 28  Pt returns today with complaint of increased abdominal distention, sob, and increased weight.  Currently weighs 284 lbs.  Prior weight 215 (estimate) on 10/18/21, 240 (estimate) on 11/15/21, and  274 21.    MRI 12/10/21 showed large left pleural effusion, has not heard back from pcp in regards to further mgmt of this.  Admits to orthopnea requiring 3-4 pillows.  O2 sat 90% in office.  Presents today in wheelchair due to difficulty with walking secondary to excess weight, swelling in legs, abdomen and SOB.   12/10/21 MRI unfortunately was limited somewhat by your ascites problem. They mention that you had a large layering left pleural effusion with bibasilar atelectasis.  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 They mention he had a shrunken cirrhotic appearing liver there was again limited in its exam but with no obvious lesion but again was limited. The gallbladder and biliary tree showed no discrete abnormality. Spleen was enlarged at 16.5 cm. Pancreas head no gross abnormality.  Neither did the kidneys. Lymph nodes were suboptimally seen but with no gross lymphadenopathy. The portal vein appeared to be grossly patent.  You had a large volume of ascites and some anasarca in your tissues. They recommend given your fluid issues that for the next MRI to do the MRI not too long after after you have a paracentesis done.  That will/could diminish some of the artifact issues that we had this time.   Unfortunately,  not always easy to coordinate these scans as an outpatient with a tap given the limitations of how things are scheduled independently. The hope is that with your hepatitis B being on treatment that the liver labs will cool off as well as some of the factors leading to the speed of this fluid accumulation.  Unfortunately the HBV medicine do take a period of time to drop the HBV levels and inflammation from this particularly if the virus level is higher to drop to start with.  S/p paracentesis with 5L ascites removed on 21, states more fluid remained but told they could only remove 5L max.    Underwent repeat paracentesis on 21 with 13.5 L ascites removed.     IV abx completed end of Oct 2021 for osteomyelitis, cleared by ID but will be on disability for 2 more months per podiatry.     he saw sebastian dietician re the low sodium diet.  Last a1c 6.5% 21.    21 labs- vitamin b12 1099 normal , folate 3.7 normal, glucose 290 (significant elevated, please share with primary MD), creatinine 0.94 normal, monocytes absolute 1.1 high, sodium 131 low, chloride 93 low, calcium 7.9 low, albumin 2.3 (prev 2.8) low, total bili 2.7 high (prev 3.5 on 21), alk phos 148 elevated (prev 100 21), ast 57 elevated (prev 61 21), alt 26 normal (prev 23 21), hgb 9.9 low (prev 12.2 21), rbc 3.0 low, platelets 146 low (prev 163).  Please share these labs with primary MD.  Would like to repeat labs again in 2 weeks prior to next office visit on .  HBV DNA viral load down to 319 million (from prev 890 million prior to starting vemlidy)  10/18/21- TIBC slightly low at 212, iron sat normal at 33, igg 2396 elevated, igm 141, ferritin 403 elevated, hep be ag positive, hep be ab negative, asma negative (prev positive), ama negative, alpha 1 232 elevated but normal phenotype MM, glucose 249 elevated (please share with primary MD), creatinine 1.12 (prev 1.54), potassium low at 3.1 (please share with primary MD as likely secondary to lasix and they may want to adjust potassium supplment dosage or consider adding spironolactone to help with low potassium and fluid retention), albumin low at 3.0 but improved from prior of 2.3, total bili 3.3 elevated, alk phos 81 (prev 69), ast 45 elevated, alt 20, alyssa negative, wbc 6.6, hgb 10.8 low but up from prev 9.0, mcv high at 99, mch 33.8 elevated, rbc 3.2 (please share with primary md), plt 142 low (increased from 92 on 21), inr 1.2, hep b core ab positive, hep a ab total negative (recommend getting hep a vaccine as not immune), hep b surf ag positive.   RECAP Of Pre visit hx: He has a PMHx of DM2 (last a1c 7.5% on glimepiride and janumet), hx of right great toe infection 2021, and presented to the ED on 21 with erythema/pain/drainage from right 4th toe. He was recently diagnosed with OM of right 4th digit on 9/10/21 and is s/p 4th digit partial metatarsal resection by Dr. Mathew. Patient did not follow up with ID outpatient for antibiotics following 9/10 surgery. Patient started on Vanc/Zosyn in ED on   He underwent further surgery by Dr. Mathew on 2021 including right partial 4th metatarsal resection with wound debridement and application of wound VAC, right excisional debridement of the hallux plantar ulcer down to subcutaneous tissues on .   While admitted  he was diagnosed with hepatitis B, ultrasound of the liver was concerning for possible liver cirrhosis and ascites and recommended to follow up outpatient with GI.   Also noted to have ANGELINA that was improving.  Discharged on  with IV Zosyn x6wks at outpatient infusion center (Dr. Behari) with wound vac f/u at Dr Mathew's clinic (podiatry).    Pt notes hx of PARDO/fatty liver disease x 10-15 yrs and treated with diet and exercise and monitoring of labs q6m with pcp. Has not seen hepatology for it.  Was noted to have jaundice during admission with T bili 4.0, direct bili 0.7, indirect bili 3.3. Denies heavy etoh use. No hx of iv drug use. Previously just had labs checked with his pcp every 6 months.  No prior egd. Colon 2-3 yrs ago with no polyps  He has had chronic thrombocytopenia since 2017 (platelets in 130's) and as low as 76 on 21.  Platelets 142 on admission () and 101 ()  Ascites first noted during sept hospitalization.    He believes he contracted HBV from his now ex-wife who notified him that she had been exposed to HBV after marital infidelity.  He  in 2021.  Believes last exposure about 2021.  Labs 2021: WBC 6.7, hemoglobin 9.0, MCV 96.7, platelets low 92, sodium 137, potassium 3.3 low, creatinine 1.54 high, calcium 7.9 low.  Labs 2021: HIV negative  Labs 2021: Hepatitis B viral  million, hepatitis B E antibody nonreactive  Labs 2021: Sodium 137 glucose 162, albumin low at 2.3, T bili 2.3 high, AST 69 high, ALT 31, alk phos 69, platelets 109 low, hemoglobin 9.7 low  Labs 2021: INR 1.39, PT 16.4, ASMA positive 1:40, hep B surface antigen positive, creatinine 1.65, alpha-1 antitrypsin 168, hepatitis A antibody IgM nonreactive, hep B core antibody IgM nonreactive, hep C antibody nonreactive, ceruloplasmin 32, AMA negative, AFP 1.1, vitamin D 11 low  Labs 2021: T bili 3.2, AST 63, ALT 32, alk phos 70, creatinine 1.75, total protein 6.0, albumin 2.3, sodium 136  Labs 2021: T bili 4.0, direct bili 0.7, indirect bili 3.3, A1c 7.5, COVID-19 negative   () GB US: FINDINGS: The pancreas is poorly visualized due to technical factors and overlying bowel gas. Liver and gallbladder are not well assessed due to poor acoustic windows. Increased liver echogenicity is suggested. Gallbladder is nonvisualized. There is a negative sonographic Cameron sign. The common bile duct measures 4 mm . The right kidney measures 12.7 cm in greatest longitudinal dimension. No right-sided hydronephrosis. There is a small to moderate amount of ascites demonstrated within the upper abdomen. Liver demonstrates a slightly nodular contour.   IMPRESSION: Summation limited by poor acoustic windows. The gallbladder was not visualized. Liver was not well assessed. Slightly nodular contour the liver suggests which may reflect underlying cirrhosis. Correlate with clinical findings. Diffuse mild increased liver echogenicity which can be seen with diffuse hepatic steatosis versus diffuse infiltration. Correlate with hepatic enzymes. Ascites.   Plan: 1. Need labs that he did and [facility] and [facility] and ishmael to call. 2. Pt will do labs in 2023. 3. Pt will do the u.s week of . 4. Not sure of added nadolol as have functional tips. 5. Pt will follow with [facility] for colon.   Stressed to pt the need for social distancing and strict handwashing and wearing a mask and to follow any other new or added CDC recommendations as this is an evolving target.  Duration of the visit was  40 minutes with 10 minutes of chart prep reviewing [facility] oltx team notes and tests and scans and labs since last visit and loading to ecw  and then 30 min via dox video for the TeleMed visit reviewing recent records current status and future plans for the patient.

## 2023-03-06 NOTE — EXAM-PHYSICAL EXAM
Telemed self reported:  Gen: no distress. Eyes: no jaundice. Mouth: no thrush. CV: no chest pain. Resp: no wheezes. Abd: no pain. Has some mild ascites and says is better. Ext: some edema.	 Neuro: no weakness.

## 2023-03-06 NOTE — HPI-TODAY'S VISIT:
Dear Garo Suarez, As suspected, for the march 2023 labs, they listed my address as being their own address instead of my [place] address (remember that was my concern or that they would send to [place] prior work address) as the client information source. We may never have received these labs unless you would have told us to look for them as we did now. Glucose was 161 elevated and please share with local providers.  BUN of 19 creatinine 1.02 sodium 135 potassium 4.4 chloride 101 CO2 27. Calcium is a little low at 8.1.  Albumin was low also slightly at 2.9.  Bilirubin was elevated at 3.0 but not fractionated.  Alk phos was normal at 92.  AST was up at 73 and ALT of 54.  INR was 1.2. White blood cell count was 4.5 hemoglobin 11.8 platelet count was 114.  MCV was normal at 96.7.  The hepatitis B was 1420 and we really need that to be not detected. Meld 13/Meld na 15 so doing better than prior labs. Back in November, your labs had shown a calcium of 7.5 which was low a creatinine of 0.86, sugar 110, albumin was lower at 2.1, AST was 114 and ALT 94 and bilirubin 4.9 so these new labs actually do appear to be better versus those. Earlier around the timeframe of August your hep B level was actually still running 186 million so this is much lower now.  Your hemoglobin then was 9.5 also lower. In summary, clearly you can see that the hemoglobin has come up to a more normal range now.  Your AST and ALT while elevated are lower than back in November.  Most importantly, the hep B DNA has gone from a very high level of 186 million in August down to only 1420 now and so on the way to being undetected again. I suspect that as your hep B DNA continues to go undetected, and with a TIPS in place, that the fluid issues and other parameters may show some further improvement and that the taps for ascites may no longer be needed. Please redo the labs in  and share with local providers. Dr. Nolasco

## 2023-04-23 ENCOUNTER — ERX REFILL RESPONSE (OUTPATIENT)
Dept: URBAN - METROPOLITAN AREA CLINIC 86 | Facility: CLINIC | Age: 53
End: 2023-04-23

## 2023-04-23 RX ORDER — FOLIC ACID 1 MG/1
TAKE 1 TABLET BY MOUTH EVERY DAY TABLET ORAL
Qty: 90 | Refills: 0 | OUTPATIENT

## 2023-04-23 RX ORDER — SPIRONOLACTONE 50 MG/1
TAKE 1 TABLET TWICE A DAY TABLET ORAL
Qty: 180 TABLET | Refills: 0 | OUTPATIENT

## 2023-04-23 RX ORDER — SPIRONOLACTONE 50 MG/1
1 TABLET TABLET ORAL TWICE A DAY
Qty: 180 TABLET | Refills: 0 | OUTPATIENT

## 2023-04-26 ENCOUNTER — LAB OUTSIDE AN ENCOUNTER (OUTPATIENT)
Dept: URBAN - METROPOLITAN AREA TELEHEALTH 2 | Facility: TELEHEALTH | Age: 53
End: 2023-04-26

## 2023-04-26 ENCOUNTER — TELEPHONE ENCOUNTER (OUTPATIENT)
Dept: URBAN - METROPOLITAN AREA CLINIC 86 | Facility: CLINIC | Age: 53
End: 2023-04-26

## 2023-06-01 ENCOUNTER — LAB OUTSIDE AN ENCOUNTER (OUTPATIENT)
Dept: URBAN - METROPOLITAN AREA TELEHEALTH 2 | Facility: TELEHEALTH | Age: 53
End: 2023-06-01

## 2023-06-19 ENCOUNTER — TELEPHONE ENCOUNTER (OUTPATIENT)
Dept: URBAN - METROPOLITAN AREA CLINIC 86 | Facility: CLINIC | Age: 53
End: 2023-06-19

## 2023-06-19 NOTE — HPI-TODAY'S VISIT:
Dear Garo Suarez,   June 13 labs were given to me.  The glucose 270, calcium low at 7.9, creatinine 0.87, sodium 136, potassium 4.5, albumin 2.7 and this is low, bilirubin 4.0 and this is elevated, alkaline phosphatase 92, AST 55, ALT 55, INR 1.1, white blood cells 3.03 and this is low, hemoglobin 10.8 and this is also well, MCV elevated at 101 and we may need to have your primary care check on your vitamin B12 levels.  Platelets low at 113.  Hepatitis B surface antigen is reactive.  The viral load is 168. We see you have an appointment with us tomorrow and will review this at the visit.  Gifty Israel PA-C

## 2023-06-20 ENCOUNTER — OFFICE VISIT (OUTPATIENT)
Dept: URBAN - METROPOLITAN AREA TELEHEALTH 2 | Facility: TELEHEALTH | Age: 53
End: 2023-06-20
Payer: COMMERCIAL

## 2023-06-20 VITALS — HEIGHT: 72 IN | BODY MASS INDEX: 24.11 KG/M2 | WEIGHT: 178 LBS

## 2023-06-20 DIAGNOSIS — K74.69 OTHER CIRRHOSIS OF LIVER: ICD-10-CM

## 2023-06-20 DIAGNOSIS — R76.0 RAISED ANTIBODY TITER: ICD-10-CM

## 2023-06-20 DIAGNOSIS — Z79.899 HIGH RISK MEDICATION USE: ICD-10-CM

## 2023-06-20 DIAGNOSIS — R74.8 ABNORMAL LIVER ENZYMES: ICD-10-CM

## 2023-06-20 DIAGNOSIS — N17.9 ACUTE KIDNEY INJURY: ICD-10-CM

## 2023-06-20 DIAGNOSIS — R18.8 ASCITES OF LIVER: ICD-10-CM

## 2023-06-20 DIAGNOSIS — Z71.89 VACCINE COUNSELING: ICD-10-CM

## 2023-06-20 DIAGNOSIS — K75.81 NASH (NONALCOHOLIC STEATOHEPATITIS): ICD-10-CM

## 2023-06-20 DIAGNOSIS — B18.1 CARRIER OF HEPATITIS B: ICD-10-CM

## 2023-06-20 DIAGNOSIS — I85.00 ESOPHAGEAL  VARICOSE VEINS: ICD-10-CM

## 2023-06-20 DIAGNOSIS — E80.4 GILBERTS SYNDROME: ICD-10-CM

## 2023-06-20 DIAGNOSIS — M86.9 OSTEOMYELITIS OF RIGHT FOOT, UNSPECIFIED TYPE: ICD-10-CM

## 2023-06-20 PROCEDURE — 99214 OFFICE O/P EST MOD 30 MIN: CPT

## 2023-06-20 RX ORDER — GLIMEPIRIDE 4 MG/1
1 TABLET WITH BREAKFAST OR THE FIRST MAIN MEAL OF THE DAY TABLET ORAL ONCE A DAY
Status: ACTIVE | COMMUNITY

## 2023-06-20 RX ORDER — SPIRONOLACTONE 50 MG/1
TAKE 1 TABLET TWICE A DAY TABLET ORAL
Qty: 180 TABLET | Refills: 0

## 2023-06-20 RX ORDER — SAXAGLIPTIN 5 MG/1
1 TABLET TABLET, FILM COATED ORAL ONCE A DAY
Status: ACTIVE | COMMUNITY

## 2023-06-20 RX ORDER — TENOFOVIR ALAFENAMIDE 25 MG/1
TAKE 1 TABLET BY MOUTH ONCE DAILY WITH FOOD TABLET ORAL
Qty: 30 TABLET | Refills: 1 | Status: ACTIVE | COMMUNITY

## 2023-06-20 RX ORDER — RIFAXIMIN 550 MG/1
1 TABLET TABLET ORAL TWICE A DAY
Qty: 180 TABLET | Refills: 0 | Status: DISCONTINUED | COMMUNITY

## 2023-06-20 RX ORDER — TENOFOVIR ALAFENAMIDE 25 MG/1
TAKE 1 TABLET BY MOUTH ONCE DAILY WITH FOOD TABLET ORAL
Qty: 30 TABLET | Refills: 2

## 2023-06-20 RX ORDER — LACTULOSE 10 G/15ML
30 ML SOLUTION ORAL
Qty: 5400 ML | Refills: 0 | Status: ACTIVE | COMMUNITY

## 2023-06-20 RX ORDER — FUROSEMIDE 40 MG/1
TAKE 1 TABLET BY MOUTH TWICE A DAY TABLET ORAL
Qty: 180 | Refills: 0 | Status: ACTIVE | COMMUNITY

## 2023-06-20 RX ORDER — FOLIC ACID 1 MG/1
TAKE 1 TABLET BY MOUTH EVERY DAY TABLET ORAL
Qty: 90 | Refills: 0
End: 2023-09-18

## 2023-06-20 RX ORDER — FUROSEMIDE 40 MG/1
TAKE 1 TABLET BY MOUTH TWICE A DAY TABLET ORAL
Qty: 180 | Refills: 0

## 2023-06-20 RX ORDER — FOLIC ACID 1 MG/1
TAKE 1 TABLET BY MOUTH EVERY DAY TABLET ORAL
Qty: 90 | Refills: 0 | Status: ACTIVE | COMMUNITY

## 2023-06-20 RX ORDER — SPIRONOLACTONE 50 MG/1
TAKE 1 TABLET TWICE A DAY TABLET ORAL
Qty: 180 TABLET | Refills: 0 | Status: ACTIVE | COMMUNITY

## 2023-06-20 NOTE — EXAM-PHYSICAL EXAM
Telemed self reported:  Gen: no distress. Eyes: no jaundice. Mouth: no thrush. CV: no chest pain. Resp: no wheezes. Abd: no pain. No tap since feb 2023. Ext: some edema.	 Neuro: no weakness.

## 2023-06-20 NOTE — HPI-TODAY'S VISIT:
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  A copy of this note will be sent to the referring provider.  Pt is back in [place] as parents and they are moving back to [place].   labs were given to me.  The glucose 270, calcium low at 7.9, creatinine 0.87, sodium 136, potassium 4.5, albumin 2.7 and this is low, bilirubin 4.0 and this is elevated, alkaline phosphatase 92, AST 55, ALT 55, INR 1.1, white blood cells 3.03 and this is low, hemoglobin 10.8 and this is also well, MCV elevated at 101 and we may need to have your primary care check on your vitamin B12 levels.  Platelets low at 113.  Hepatitis B surface antigen is reactive.  The viral load is 168. We see you have an appointment with us tomorrow and will review this at the visit.  He says he is working with primary provider.  Re oltx he says says he has  appt at [facility] for blood and to see Dr Duckworth.  2023 labs  Glucose was 161 elevated and please share with local providers.  BUN of 19 creatinine 1.02 sodium 135 potassium 4.4 chloride 101 CO2 27. Calcium is a little low at 8.1.  Albumin was low also slightly at 2.9.  Bilirubin was elevated at 3.0 but not fractionated.  Alk phos was normal at 92.  AST was up at 73 and ALT of 54.  INR was 1.2. White blood cell count was 4.5 hemoglobin 11.8 platelet count was 114.  MCV was normal at 96.7.  The hepatitis B was 1420 and we really need that to be not detected. Meld 13/Meld na 15 so doing better than prior labs. Back in November, your labs had shown a calcium of 7.5 which was low a creatinine of 0.86, sugar 110, albumin was lower at 2.1, AST was 114 and ALT 94 and bilirubin 4.9 so these new labs actually do appear to be better versus those. Earlier around the timeframe of August your hep B level was actually still running 186 million so this is much lower now.  Your hemoglobin then was 9.5 also lower. In summary, clearly you can see that the hemoglobin has come up to a more normal range now.  Your AST and ALT while elevated are lower than back in November.  Most importantly, the hep B DNA has gone from a very high level of 186 million in August down to only 1420 now and so on the way to being undetected again. I suspect that as your hep B DNA continues to go undetected, and with a TIPS in place, that the fluid issues and other parameters may show some further improvement and that the taps for ascites may no longer be needed.   Post tips he has done better.  Re ascites he is controlled and no tap since 2023.  [facility] did a tap when colonoscopy Feb 15 2023.  He says meld score when he spoke at 17.  Pt had tips done through [facility] in Oct 2022 and he says that he has needed one tap after the tips and none since.  No pse issues.   He is still on the vemlidy every day.  2022 na 134 and k 4.2 and cl 105 and co2 24 and ca 7.5 and bun 12 and cr 0.86 and glu 110. Tp 5.4 and alb 2.1 and alk 75 and ast 114 and alt 94 and tb 4.9 and wbc 7.5 and hg 10.2 and hct 29.3 and mcv 95.8 and plat 112. Ammonia 84.   Doppler 2022 : EXAM: US ABDOMEN DOPPLER COMPLETE   CLINICAL INDICATION: cirrhosis, S/P TIPS.   TECHNIQUE: Grayscale, pulsed wave and color Doppler sonography of the upper abdomen were performed. ESRC.3.7.1   COMPARISON: 2022   FINDINGS:  Liver: Cirrhotic liver morphology. No lesions.   Bile Ducts: No dilated intrahepatic biliary radicles. Common duct measures 4 mm.   Gallbladder: Poorly visualized, wall appears thickened where visualized. Cameron's sign is negative.   Pancreas: Unremarkable where visualized.   Right Kidney: Measures 10.2 cm. Normal echogenicity. No hydronephrosis.   Left Kidney: Measures 11.0 cm. Normal echogenicity. No hydronephrosis.   Spleen: Measures 16.2 cm.   Aorta: Normal caliber where imaged.   IVC: Normal proximally, not imaged distally.    Hepatic Veins: Patent.   Portal Veins: Hepatopetal flow. 32 cm/s.   TIPS: Patent: Portal venous end 144 cm/s, midportion 180 cm/s, hepatic venous end, 119 cm/s.   Hepatic Arteries: Peak systolic velocity 92 cm/s. Resistive index 0.74.   Other: Ascites.   IMPRESSION:   1. Patent TIPS. 2. Cirrhotic liver. 3. Ascites. 4. Splenomegaly.   2022 tap: Patient Name:  LIZY SOMMERS       : 1970    SEX: Male Ordering Physician:  NAKIA ARAIZA Location:  [facility].IR.1   Indication: Patient with history of cirrhosis and recurrent ascites. Paracentesis requested.   Procedure:  Ultrasound-guided paracentesis   Findings:  1.   Successful removal of 7500 mL of abdominal fluid.   IMPRESSION: Impression:  Successful ultrasound-guided paracentesis.  Oct 31 2022 tips Narrative & Impression Patient Name:  LIZY SOMMERS       : 1970    SEX: Male Ordering Physician:  RILEY JIMENES Location:  [facility].IR.1   Date of exam:  10/31/2022    Clinical Indication:  Refractory ascites   Procedure:    1.  Primary TIPSS 2.  Embolization of gastroesophageal varices. 3.  Paracentesis 4.  Triple lumen central venous catheter placement with ultrasound and fluoroscopic guidance   Findings:    1.  Right internal jugular vein is ultrasonographically patent and compresses.  Needle entry was documented. 2.  Right hepatic venogram confirms vein patency. 3.  Portogram demonstrates patent portal veins with a large left gastric vein varices. 4.  The initial portosystemic gradient is 13 mmHg. 5.  Post-TIPSS portosystemic gradient is 7 mmHg. 6.  The TIPSS has a smooth course and extends from the right hepatic vein to right portal vein. 7.  There is good flow through the shunt at the conclusion of the procedure with minimal appreciable filling of varices. 8.  3.5 liters of fluid were drained during the procedure. 9.  New right internal jugular approach triple-lumen catheter terminates at the superior cavoatrial junction.   IMPRESSION: Impression:    1.  Successful creation of TIPS from right hepatic vein to right portal vein with a 10 mm x 8 cm x 2 cm controlled-expansion Viatorr stent (ballooned to 10 mm).  2.  The portosystemic gradient was reduced from 13 mm Hg to 7 mmHg.  10-4 13 liter tap 8-16 9.5 liter tap  Aug 2022 CT EXAM: CT Abdomen + Pelvis w/o IV Contrast CLINICAL INDICATION: Abdominal pain unspecified. TECHNIQUE: Noncontrast images were obtained through the abdomen and pelvis. ESR.1.7.2 COMPARISON: MRI abdomen for 3/20/2022 FINDINGS: Lower Thorax: Small left pleural effusion. Moderate hiatal hernia. Liver: Cirrhotic and shrunken appearance of the hepatic parenchyma, and nodular contour with perihepatic ascites. No focal lesions. Gallbladder/Biliary Tree: Normal. Spleen: Redemonstration of splenomegaly. Pancreas: Normal. Adrenal Glands: Normal. Kidneys/Ureters: Normal. Gastrointestinal: No bowel obstruction. Scattered fecal material within loops of large bowel. Bladder: Normal. Prostate/Seminal Vesicles: Normal. Lymph Nodes: No pathologically enlarged lymphadenopathy. Vessels: Lack of IV contrast limits evaluation of vascular structures. Aorta is not aneurysmal. Atherosclerotic calcifications of the splenic vessels. Prominent paraumbilical vein. Peritoneum/Retroperitoneum: Large volume intra-abdominal ascites. Bones/Soft Tissues: Bilateral gynecomastia. Small fluid containing umbilical hernia. Right greater than left small fat-containing inguinal hernias. Mild lower pelvic soft tissue edema. Multilevel degenerative changes with mild retrolisthesis of L5 on S1. No aggressive soft tissue or osseous lesions. IMPRESSION: Cirrhotic hepatic morphology with large volume intra-abdominal ascites, splenomegaly, and portosystemic varices suggestive of underlining portal hypertension. The images were reviewed and interpreted by Paris Dwyer MD. *** Final ***   Electronically Signed By:  Sandra Dwyer on  2022 04:56   Dictated by:  Jitendra Braswell    Recent taps: paracentesis on 2022 with 7.5 L removed. Oct 31 3.5 l Prior he had a 13 L tap done on 2022.  He also had a 9.5 L tap done on .  He had a 16 L tap done on May 27.  He was to do a  colonoscopy scheduled at [facility] with  but issues and ride could not bring him. Redoing in .  Prior: He ran out of the vemlidy and he says no more lapses.  Aug 16 on you that included a sodium of 133 potassium 3.8 BUN of 25 creatinine 0.73 glucose of 111 albumin 2.7 total bilirubin up to 2.5 AST 79 ALT 74 and alkaline phosphatase 57. August 15 labs showed white blood cell count 4 hemoglobin 9.5 platelet count 101 and MCV 93.4 and INR 1.41.  He last had a paracentesis done  that showed low albumin of less than 1.5 nucleated cell count that was 685 but neutrophils were only 1%.  Aug 2022 ct cirrhotic liver and splenomegaly and varices.  I was concerned hepatitis B is no longer under control and your surface antigen is positive with the E antigen positive and E antibody negative and hep B level of 186 million.    hbv 3280 and in May 236 million. He says he had a lapse for a 2 weeks. Hep delta 2022 negative.  The labs from the discharge summary mention that you are taking folic acid 1 mg a day, Lasix 40 mg twice a day, spironolactone 100 mg a day, glimepiride 4 mg a day, lactulose 30 g p.o. 3 times daily, saxagliptin 5 mg a day, and the Vemlidy 25 mg a day.   saw oltx team. They see him in .  As you recall your last visit was back in  and I believe you are actually out of state at the time and did that visit remotely as you are bringing your parents back down with you to [place] I believe. You are to see us in approximately 4 weeks and you are to follow-up with the transplant team. You no showed for your  appointment. Says that his fluid weight when needs taps is only 220 and down from 270 peal. Getting tapped often. To get a tips procedure also he has some pse baseline.  May 28 [facility] labs for me after you advsied her of them being done there. Sodium 133 which is slightly low, potassium 4.0,  chloride 101 CO2 27 BUN of 17 creatinine 0.99 glucose 167 albumin was low at 2.6 bilirubin elevated at 4.0 calcium 7.9 which was low AST was 43 slightly up ALT 29 alk phos 66 white blood cell count 4.9 hemoglobin low at 10.1 MCV elevated 93.1 platelet count low at 137.  labs showed sodium 138 potassium 3.8 creatinine 0.88 bilirubin 3.5 AST 48 ALT 17 alk phos 45 hemoglobin 10.1. Unable to calculate meld score.   May 5 [facility] labs sent in from [place]. Glucose 73 BUN 21 creatinine 0.84 sodium 134 which is slightly low potassium 4.1 chloride 101 CO2 of 28 calcium low at 8.2 albumin low at 3.0 bilirubin elevated 2.7 alkaline phosphatase 70 AST 82 ALT 54 INR 1.3.  White cell count 4.6 hemoglobin 10.2 which is low.  Platelet count 129 which is low.  MCV 95.5.  Neutrophils normal at 2608 and lymphocytes 1003. Prior march labs: na 138 and cr 0.8 and alb 3.8 and tb 3.5 and ast 48 and alt 17 and alk 45 and hg 10.1. Meld 13 and meld na16.  Pt re weight lowest 166 in aug 2022  and now 176 and so hanging in there.  Folks transitioned down to [place].  Still on both diuretics: Lasix 40mg po bid. Aldactone 50 mg po bid.  Prior visit he was to 280 and staying 100 pounds less now.   2022 labs when left na 138 and k 3.8 and cl99 and co2 29 and glu 190 and cr 0.88 and alb 3.8 and tv 3.5 and tb 3.5,ast 48 and alt 17 and alk 45 and wbc 3.8 hg 10.1.   tap less than 1.5 alb  and alb less than 3 and nuc 89 and neutrophils 3%   Document Type:	CT Abdomen + Pelvis w/o IV Contrast Document Date:	2022 04:02 EDT Document Status:	Auth (Verified) Document Title:	CT Abdomen + Pelvis w/o IV Contrast Performed By:	Jitendra Braswell  Verified By:	Sandra Dwyer on 2022 04:56 EDT Encounter info:	[id number], [facility], Inpatient, 2022 - 2022  * Final Report *  Reason For Exam Abdominal pain unspecified  REPORT EXAM: CT Abdomen + Pelvis w/o IV Contrast  CLINICAL INDICATION: Abdominal pain unspecified.  TECHNIQUE: Noncontrast images were obtained through the abdomen and pelvis. ESRC.1.7.2  COMPARISON: MRI abdomen for 3/20/2022  FINDINGS:  Lower Thorax: Small left pleural effusion. Moderate hiatal hernia.  Liver: Cirrhotic and shrunken appearance of the hepatic parenchyma, and nodular contour with perihepatic ascites. No focal lesions.  Gallbladder/Biliary Tree: Normal.  Spleen: Redemonstration of splenomegaly.  Pancreas: Normal.  Adrenal Glands: Normal.  Kidneys/Ureters: Normal.  Gastrointestinal: No bowel obstruction. Scattered fecal material within loops of large bowel.   Bladder: Normal.  Prostate/Seminal Vesicles: Normal.  Lymph Nodes: No pathologically enlarged lymphadenopathy.  Vessels: Lack of IV contrast limits evaluation of vascular structures. Aorta is not aneurysmal. Atherosclerotic calcifications of the splenic vessels. Prominent paraumbilical vein.  Peritoneum/Retroperitoneum: Large volume intra-abdominal ascites.  Bones/Soft Tissues: Bilateral gynecomastia. Small fluid containing umbilical hernia. Right greater than left small fat-containing inguinal hernias. Mild lower pelvic soft tissue edema. Multilevel degenerative changes with mild retrolisthesis of L5 on S1. No aggressive soft tissue or osseous lesions.   IMPRESSION:  Cirrhotic hepatic morphology with large volume intra-abdominal ascites, splenomegaly, and portosystemic varices suggestive of underlining portal hypertension.  The images were reviewed and interpreted by Paris Dwyer MD.  Signature Line *** Final ***  Electronically Signed By:  Sandra Dwyer on  2022 04:56    Document Type:	MRI Abdomen w/ + w/o Contrast Document Date:	2022 19:51 EDT Document Status:	Auth (Verified) Document Title:	MRI Abdomen w/ + w/o Contrast Performed By:	Jose Juan Omalley  Verified By:	Jojo Salcedo on 2022 10:32 EDT Encounter info:	[id number], [facility], Inpatient, 3/23/2022 - 2022  * Final Report *  Reason For Exam Liver disease; hcc screen;Liver disease  REPORT EXAM: MRI Abdomen w/ + w/o Contrast  CLINICAL INDICATION: History of hepatitis B cirrhosis with ascites. And esophageal varices status post endoscopic banding on 3/25/2022. Evaluate for HCC.  TECHNIQUE: Multisequence, multiplanar MRI of the abdomen was performed without and with intravenous contrast. ESRC.2.7.3  CONTRAST: 17cc cc of Prohance  COMPARISON: MRI abdomen 1/3/2022  FINDINGS:  Lower Thorax: Small left pleural effusion and left lower lobe atelectasis similar to prior.  Liver: No fat or iron. Cirrhotic liver morphology similar to prior. No lesions.  Gallbladder/Biliary Tree: Mild diffuse gallbladder wall thickening related to third spacing/underlying liver disease. No stones. No biliary ductal dilatation.  Spleen: Unchanged splenomegaly measuring up to 16.5 cm in craniocaudal length. Small curvilinear T2 hypointensity along the lateral subcapsular region compatible with small remote intracapsular bleed.  Pancreas: Normal.  Adrenal Glands: Normal.   Kidneys/Ureters: Normal.  Gastrointestinal: Normal.   Lymph Nodes: No pathologically enlarged lymph nodes.  Vessels: Unchanged large paraesophageal varices. Additionally there are perigastric, perisplenic, and peritoneal/retroperitoneal varices are similar to prior exam. Recanalized paraumbilical vein. No aortic aneurysm. Patent portal vein, splenic vein, hepatic veins, and SMV.  Peritoneum/Retroperitoneum: Large volume ascites similar to prior. Unchanged smooth diffuse peritoneal enhancement without focal nodularity, similar to prior.  Bones/Soft Tissues: Expected marrow signal. No worrisome osseous lesions.  IMPRESSION:     1.  Cirrhotic liver morphology without evidence of hepatocellular carcinoma. 2.  Sequela of portal hypertension, including large volume ascites, splenomegaly, and upper abdominal varices. 3.  Unchanged diffuse mild peritoneal enhancement which could be from chronic peritoneal irritation from multiple prior paracenteses. However, if there is concern for peritoneal infection, recommend fluid sampling.  The images were reviewed and interpreted by Jojo Salcedo MD.  Signature Line *** Final ***  Electronically Signed By:  Jojo Salcedo on  2022 10:32  Dictated by:  Jose Juan Omalley   22- wbc 5.3 normal, rbc 3.18 low with polychromasia, macrocytes and anisocytosis (please share with primary md), hgb 10.3 low (prev 11.1 and 9.9), platelets 76 low (actual platelet count may be slightly higher than reported due to clumping, prev 148), glucose 137 high but improved (prev 310), creatinine 0.88 normal, sodium 134 normal, calcium low at 8 (please share with primary md), albumin is low at 2.9 (but improved from prev of 2.3), total bili 3.7 high (this is up from prev of 2.1), alk phos 141 high (prev 126), ast 163 high (prev 123), alt 104 high (prev 84), hep b dna 9910 (prev 13,270), inr 1.2.  MELD 13, MELD- NA 16.  /- white blood count 5.5 normal, rbc 3.35 low, hemoglobin 11.1 low (prev 11.1), mcv 99 high, platelets 148 low (prev 140), glucose very high at 310 (please follow up with primary md regarding diabetes control), sodium 134 normal,  creatinine 0.99 normal, calcium 8.1 low (please share with primary md), albumin 2.3 low, total bilirubin 2.1 high (improved from 2.8 prev), alk phos 126 high (prev 120), ast 123 high (prev 133), alt 84 high (prev 68), hep b viral load 13,270 (prev 20,890), inr 1.3 (prev 1.1).   MELD 12, MELD-NA 15.   Scheduled for repeat egd 22 with Dr. Estes and he did that in hospital and 2022 grade 3 [id number] and 6 bands and redo in 1m and not done yet as left and do when back.  Referred to cardiology given cardiomegaly and left ventricular dilation seen on CT chest and to see them in april as also cancelled due to insurance.   labs show INR normal at 1.1 and compared to  that is actually lower than 1.2.  Sugar down to 156 from preadmission levels in the 290.  BUN of 16 creatinine 1.11 sodium 135 potassium 4.2 calcium slightly low at 8.4 but up from 7.93 the last admission when it was 7.9.  Albumin still up at 3.3 and previously had been 2.33 that admission.  Total bilirubin still elevated 2.8.  Alkaline phosphatase now normal at 120.   and ALT 68.  He to follow these labs.  Hep B down to 20,890. White blood cell count 5.7 hemoglobin 11.1 which is diminished.  Platelet count 140.  MCV 95.  Neutrophils 3.9 and lymphocytes 1.0.   One looks at your hospital labs as comparison instead of the nov labs: : cr 1.07 and alb 2.7 and tb 3.3 and ast 66 and alt 28.    hg 9.7 and plat 101. Inr 1.57.  HBV [id number] then.   HBV noted and then led to hbv tx and we started the Vemlidy in 2021.  Pt admtited from clinic Dec 28 through  approx and had 3 abd taps (prior had 2 taps pre admit). Varied 5 to 15 liters. IV alb and diuresis and lost 284 to 191.   On lasix 40mg po bid and aldactone 50mg po qd.  HBV level was checked in the hospital.  [id number] eag pos and eab neg. That is coming down from a much higher level at over 890 million.  Document Type:	MRI Abdomen w/ + w/o Contrast Document Date:	2022 18:00 EST Document Status:	Auth (Verified) Document Title:	MRI Abdomen w/ + w/o Contrast Performed By:	Deacon Huang  Verified By:	Deacon Huang on 2022 07:47 EST Encounter info:	[id number], [facility], Inpatient, 2021 - 2022  * Final Report *  Reason For Exam Liver disease  REPORT EXAM: MRI Abdomen w/ + w/o Contrast  CLINICAL INDICATION: Liver disease.  TECHNIQUE: Multisequence, multiplanar MRI of the abdomen was performed without and with intravenous contrast. ESRC.2.7.3  CONTRAST: 20 cc of Prohance  COMPARISON: 12/10/2021.  FINDINGS:  Lower Thorax: Small to moderate-sized left pleural effusion. Nonspecific nodular signal abnormality in the mid right middle and lower lobes, suboptimally evaluated on MRI.  Liver: No fat or iron. Morphologic changes of chronic liver disease including nodular surface contour, lobar redistribution and fissural widening. No definite focal lesion.   Gallbladder/Biliary Tree: Nonspecific mural edema, may be reactive to chronic liver disease and/or third spacing of fluid.  Spleen: Enlarged measuring up to 15.6 cm. Scattered punctate foci of signal dropout on in phase compared to out of phase imaging suggestive of Gamna-Lucia bodies.  Pancreas: Normal.  Adrenal Glands: Normal.   Kidneys/Ureters: Normal.  Gastrointestinal: Circumferential wall thickening with mural edema involving the ascending colon, likely sequelae of portal colopathy.   Lymph Nodes: Normal.  Vessels: Recanalized paraumbilical vein with large paraesophageal, perigastric and perisplenic varices. Patent portal veins.  Peritoneum/Retroperitoneum: Moderate volume abdominopelvic ascites.  Bones/Soft Tissues: Anasarca. No aggressive osseous lesion.  IMPRESSION:     Examination again suboptimal secondary to moderate volume ascites causing dielectric effect artifact.  Morphologic changes of chronic liver disease with sequelae of portal hypertension including moderate volume ascites, splenomegaly and upper abdominal varices. No definite focal hepatic lesion.  Moderate left pleural effusion. Nonspecific signal abnormality in the right middle and lower lobes, suboptimally evaluated on MRI. This can be further evaluated with dedicated CT chest for evaluation of airspace disease as clinically warranted.   Signature Line *** Final ***  Electronically Signed By:  Deacon Huang on  2022 07:47  Dictated by:  Deacon Huang   Chest was also looked at but edema limited for the nodularity and they recommended to redo when more diuresed.   Document Type:	CT Chest w/o Contrast Document Date:	2022 10:22 EST Document Status:	Auth (Verified) Document Title:	CT Chest w/o Contrast Performed By:	Davey Zuniga  Verified By:	Caitlyn Bledsoe on 2022 14:35 EST Encounter info:	[id number], [facility], Inpatient, 2021 - 2022  * Final Report *  Reason For Exam Mediastinal mass  REPORT EXAM: CT Chest w/o Contrast  CLINICAL INDICATION:  Mediastinal mass. MASS  TECHNIQUE: Multiple-row detector helical CT examination of the chest without IV contrast. Axial, sagittal, and coronal reconstructed images.  COMPARISON:  Chest x-ray 2021.    FINDINGS:  Support Devices:  None.    Mediastinum: Cardiomegaly with left ventricular dilation. No pericardial effusion. Slightly low-attenuation of ventricular blood pool which may reflect anemia. Thickened appearance of the mid and distal esophagus.  Lymph nodes: No pathologically enlarged mediastinal, hilar, or axillary lymph nodes.     Airways: Central airways are patent. Mucus plugging within the dependent distal basilar airways.  Lungs/Pleura: Groundglass/consolidative airspace opacities within the bilateral lung bases, left greater than right. Peripheral groundglass opacities within the right middle lobe and along the right minor fissure. Bibasilar atelectasis. No pulmonary edema. Small left pleural effusion. No pneumothorax.    Upper abdomen: Large volume abdominal ascites. Mural gallbladder wall thickening. Splenomegaly. Cirrhotic morphology of the liver. These findings are better characterized on MRI of the abdomen dated 2022. Curvilinear densities face the gastrohepatic ligament likely represent varicosities.  Bones and soft tissues:  Imaged thyroid gland is normal. No acute osseous abnormality multilevel degenerative disc disease with minimal anterior disc osteophytosis. Diffuse anasarca.. Symmetric bilateral gynecomastia    IMPRESSION:   1. Basilar predominant airspace opacities and small left pleural effusion is most consistent with resolving pulmonary edema.  2. The wall of the mid/distal esophagus appears severely thickened. These findings may reflect promt documented sequela of portal hypertension. However, it is difficult to exclude esophageal malignancy and follow-up contrast enhanced CT chest is recommended for further characterization.  3. Peripheral fissural and right middle lobe nodularity. Findings are nonspecific but for further characterization a follow-up CT chest is recommended after resolution of left pleural effusion and when the patient is able to sustain a inspirational breath-hold.  The images were reviewed and interpreted by Catilyn Bledsoe MD.  He did the egd and banded x 5 and Dr Beauchamp her and need to redo that in 1m. has appt to do with Dr Estes. : Large varices and 5 bands and complete erradication and mild phtn gastropathy.  Needs to be caught up.  Prior visit with [facility]: Dec 28  Pt returns today with complaint of increased abdominal distention, sob, and increased weight.  Currently weighs 284 lbs.  Prior weight 215 (estimate) on 10/18/21, 240 (estimate) on 11/15/21, and  274 21.    MRI 12/10/21 showed large left pleural effusion, has not heard back from pcp in regards to further mgmt of this.  Admits to orthopnea requiring 3-4 pillows.  O2 sat 90% in office.  Presents today in wheelchair due to difficulty with walking secondary to excess weight, swelling in legs, abdomen and SOB.   12/10/21 MRI unfortunately was limited somewhat by your ascites problem. They mention that you had a large layering left pleural effusion with bibasilar atelectasis.  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 They mention he had a shrunken cirrhotic appearing liver there was again limited in its exam but with no obvious lesion but again was limited. The gallbladder and biliary tree showed no discrete abnormality. Spleen was enlarged at 16.5 cm. Pancreas head no gross abnormality.  Neither did the kidneys. Lymph nodes were suboptimally seen but with no gross lymphadenopathy. The portal vein appeared to be grossly patent.  You had a large volume of ascites and some anasarca in your tissues. They recommend given your fluid issues that for the next MRI to do the MRI not too long after after you have a paracentesis done.  That will/could diminish some of the artifact issues that we had this time.   Unfortunately,  not always easy to coordinate these scans as an outpatient with a tap given the limitations of how things are scheduled independently. The hope is that with your hepatitis B being on treatment that the liver labs will cool off as well as some of the factors leading to the speed of this fluid accumulation.  Unfortunately the HBV medicine do take a period of time to drop the HBV levels and inflammation from this particularly if the virus level is higher to drop to start with.  S/p paracentesis with 5L ascites removed on 21, states more fluid remained but told they could only remove 5L max.    Underwent repeat paracentesis on 21 with 13.5 L ascites removed.     IV abx completed end of Oct 2021 for osteomyelitis, cleared by ID but will be on disability for 2 more months per podiatry.     [facility].  Last a1c 6.5% 21.    21 labs- vitamin b12 1099 normal , folate 3.7 normal, glucose 290 (significant elevated, please share with primary MD), creatinine 0.94 normal, monocytes absolute 1.1 high, sodium 131 low, chloride 93 low, calcium 7.9 low, albumin 2.3 (prev 2.8) low, total bili 2.7 high (prev 3.5 on 21), alk phos 148 elevated (prev 100 21), ast 57 elevated (prev 61 21), alt 26 normal (prev 23 21), hgb 9.9 low (prev 12.2 21), rbc 3.0 low, platelets 146 low (prev 163).  Please share these labs with primary MD.  Would like to repeat labs again in 2 weeks prior to next office visit on .  HBV DNA viral load down to 319 million (from prev 890 million prior to starting vemlidy)  10/18/21- TIBC slightly low at 212, iron sat normal at 33, igg 2396 elevated, igm 141, ferritin 403 elevated, hep be ag positive, hep be ab negative, asma negative (prev positive), ama negative, alpha 1 232 elevated but normal phenotype MM, glucose 249 elevated (please share with primary MD), creatinine 1.12 (prev 1.54), potassium low at 3.1 (please share with primary MD as likely secondary to lasix and they may want to adjust potassium supplment dosage or consider adding spironolactone to help with low potassium and fluid retention), albumin low at 3.0 but improved from prior of 2.3, total bili 3.3 elevated, alk phos 81 (prev 69), ast 45 elevated, alt 20, alyssa negative, wbc 6.6, hgb 10.8 low but up from prev 9.0, mcv high at 99, mch 33.8 elevated, rbc 3.2 (please share with primary md), plt 142 low (increased from 92 on 21), inr 1.2, hep b core ab positive, hep a ab total negative (recommend getting hep a vaccine as not immune), hep b surf ag positive.   RECAP Of Pre visit hx: He has a PMHx of DM2 (last a1c 7.5% on glimepiride and janumet), hx of right great toe infection 2021, and presented to the ED on 21 with erythema/pain/drainage from right 4th toe. He was recently diagnosed with OM of right 4th digit on 9/10/21 and is s/p 4th digit partial metatarsal resection by Dr. Mathew. Patient did not follow up with ID outpatient for antibiotics following 9/10 surgery. Patient started on Vanc/Zosyn in ED on   He underwent further surgery by Dr. Mathew on 2021 including right partial 4th metatarsal resection with wound debridement and application of wound VAC, right excisional debridement of the hallux plantar ulcer down to subcutaneous tissues on .   While admitted  he was diagnosed with hepatitis B, ultrasound of the liver was concerning for possible liver cirrhosis and ascites and recommended to follow up outpatient with GI.   Also noted to have ANGELINA that was improving.  Discharged on  with IV Zosyn x6wks at outpatient infusion center (Dr. Behari) with wound vac f/u at Dr Mathew's clinic (podiatry).    Pt notes hx of PARDO/fatty liver disease x 10-15 yrs and treated with diet and exercise and monitoring of labs q6m with pcp. Has not seen hepatology for it.  Was noted to have jaundice during admission with T bili 4.0, direct bili 0.7, indirect bili 3.3. Denies heavy etoh use. No hx of iv drug use. Previously just had labs checked with his pcp every 6 months.  No prior egd. Colon 2-3 yrs ago with no polyps  He has had chronic thrombocytopenia since 2017 (platelets in 130's) and as low as 76 on 21.  Platelets 142 on admission () and 101 ()  Ascites first noted during sept hospitalization.    He believes he contracted HBV from his now ex-wife who notified him that she had been exposed to HBV after marital infidelity.  He  in 2021.  Believes last exposure about 2021.  Labs 2021: WBC 6.7, hemoglobin 9.0, MCV 96.7, platelets low 92, sodium 137, potassium 3.3 low, creatinine 1.54 high, calcium 7.9 low.  Labs 2021: HIV negative  Labs 2021: Hepatitis B viral  million, hepatitis B E antibody nonreactive  Labs 2021: Sodium 137 glucose 162, albumin low at 2.3, T bili 2.3 high, AST 69 high, ALT 31, alk phos 69, platelets 109 low, hemoglobin 9.7 low  Labs 2021: INR 1.39, PT 16.4, ASMA positive 1:40, hep B surface antigen positive, creatinine 1.65, alpha-1 antitrypsin 168, hepatitis A antibody IgM nonreactive, hep B core antibody IgM nonreactive, hep C antibody nonreactive, ceruloplasmin 32, AMA negative, AFP 1.1, vitamin D 11 low  Labs 2021: T bili 3.2, AST 63, ALT 32, alk phos 70, creatinine 1.75, total protein 6.0, albumin 2.3, sodium 136  Labs 2021: T bili 4.0, direct bili 0.7, indirect bili 3.3, A1c 7.5, COVID-19 negative   () GB US: FINDINGS: The pancreas is poorly visualized due to technical factors and overlying bowel gas. Liver and gallbladder are not well assessed due to poor acoustic windows. Increased liver echogenicity is suggested. Gallbladder is nonvisualized. There is a negative sonographic Cameron sign. The common bile duct measures 4 mm . The right kidney measures 12.7 cm in greatest longitudinal dimension. No right-sided hydronephrosis. There is a small to moderate amount of ascites demonstrated within the upper abdomen. Liver demonstrates a slightly nodular contour.   IMPRESSION: Summation limited by poor acoustic windows. The gallbladder was not visualized. Liver was not well assessed. Slightly nodular contour the liver suggests which may reflect underlying cirrhosis. Correlate with clinical findings. Diffuse mild increased liver echogenicity which can be seen with diffuse hepatic steatosis versus diffuse infiltration. Correlate with hepatic enzymes. Ascites.   Plan: 1. Needs to get u.s with doppler done here. 2. He will see oltx team in . 3. If we did not get back the hep b tests they should check it then. 4. I hope that he does not need a oltx but am glad he is being followed by olts team in case.   Duration of the visit was 35  minutes with 10 minutes of chart prep reviewing [place] oltx team notes and tests and scans and labs since last visit and loading to ecw  and then 25 min via [email or web address] video for the TeleMed visit reviewing recent records current status and future plans for the patient.

## 2023-08-21 ENCOUNTER — ERX REFILL RESPONSE (OUTPATIENT)
Dept: URBAN - METROPOLITAN AREA CLINIC 86 | Facility: CLINIC | Age: 53
End: 2023-08-21

## 2023-08-21 RX ORDER — TENOFOVIR ALAFENAMIDE 25 MG/1
TAKE 1 TABLET BY MOUTH ONCE DAILY WITH FOOD TABLET ORAL
Qty: 30 TABLET | Refills: 2 | OUTPATIENT

## 2023-08-21 RX ORDER — TENOFOVIR ALAFENAMIDE 25 MG/1
TAKE 1 TABLET BY MOUTH ONCE DAILY WITH FOOD TABLET ORAL
Qty: 30 TABLET | Refills: 0 | OUTPATIENT

## 2023-09-01 ENCOUNTER — LAB OUTSIDE AN ENCOUNTER (OUTPATIENT)
Dept: URBAN - METROPOLITAN AREA TELEHEALTH 2 | Facility: TELEHEALTH | Age: 53
End: 2023-09-01

## 2023-09-25 ENCOUNTER — OFFICE VISIT (OUTPATIENT)
Dept: URBAN - METROPOLITAN AREA TELEHEALTH 2 | Facility: TELEHEALTH | Age: 53
End: 2023-09-25
Payer: COMMERCIAL

## 2023-09-25 ENCOUNTER — DASHBOARD ENCOUNTERS (OUTPATIENT)
Age: 53
End: 2023-09-25

## 2023-09-25 VITALS — WEIGHT: 182 LBS | BODY MASS INDEX: 24.65 KG/M2 | HEIGHT: 72 IN

## 2023-09-25 DIAGNOSIS — K74.69 OTHER CIRRHOSIS OF LIVER: ICD-10-CM

## 2023-09-25 DIAGNOSIS — R18.8 ASCITES OF LIVER: ICD-10-CM

## 2023-09-25 DIAGNOSIS — I85.00 ESOPHAGEAL  VARICOSE VEINS: ICD-10-CM

## 2023-09-25 DIAGNOSIS — K75.81 NONALCOHOLIC STEATOHEPATITIS: ICD-10-CM

## 2023-09-25 PROCEDURE — 99214 OFFICE O/P EST MOD 30 MIN: CPT

## 2023-09-25 RX ORDER — GLIMEPIRIDE 4 MG/1
1 TABLET WITH BREAKFAST OR THE FIRST MAIN MEAL OF THE DAY TABLET ORAL ONCE A DAY
Status: ACTIVE | COMMUNITY

## 2023-09-25 RX ORDER — SPIRONOLACTONE 50 MG/1
TAKE 1 TABLET TWICE A DAY TABLET ORAL
Qty: 180 TABLET | Refills: 0

## 2023-09-25 RX ORDER — SPIRONOLACTONE 50 MG/1
TAKE 1 TABLET TWICE A DAY TABLET ORAL
Qty: 180 TABLET | Refills: 0 | Status: ACTIVE | COMMUNITY

## 2023-09-25 RX ORDER — TENOFOVIR ALAFENAMIDE 25 MG/1
TAKE 1 TABLET BY MOUTH ONCE DAILY WITH FOOD TABLET ORAL
Qty: 30 TABLET | Refills: 2

## 2023-09-25 RX ORDER — FUROSEMIDE 40 MG/1
TAKE 1 TABLET BY MOUTH TWICE A DAY TABLET ORAL
Qty: 180 | Refills: 0 | Status: ACTIVE | COMMUNITY

## 2023-09-25 RX ORDER — LACTULOSE 10 G/15ML
30 ML SOLUTION ORAL
Qty: 5400 ML | Refills: 0 | Status: ACTIVE | COMMUNITY

## 2023-09-25 RX ORDER — TENOFOVIR ALAFENAMIDE 25 MG/1
TAKE 1 TABLET BY MOUTH ONCE DAILY WITH FOOD TABLET ORAL
Qty: 30 TABLET | Refills: 0 | Status: ACTIVE | COMMUNITY

## 2023-09-25 RX ORDER — SAXAGLIPTIN 5 MG/1
1 TABLET TABLET, FILM COATED ORAL ONCE A DAY
Status: ACTIVE | COMMUNITY

## 2023-09-25 RX ORDER — FUROSEMIDE 40 MG/1
TAKE 1 TABLET BY MOUTH TWICE A DAY TABLET ORAL
Qty: 180 | Refills: 0

## 2023-09-25 NOTE — HPI-TODAY'S VISIT:
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  A copy of this note will be sent to the referring provider.  Pt says his aunt  in [place] 84 yrs.  Pt says he has kept weight at 180.  He is still seeing the [facility] team for the oltx team.  He needs to do labs for u.s.   labs were given to me.  The glucose 270, calcium low at 7.9, creatinine 0.87, sodium 136, potassium 4.5, albumin 2.7 and this is low, bilirubin 4.0 and this is elevated, alkaline phosphatase 92, AST 55, ALT 55, INR 1.1, white blood cells 3.03 and this is low, hemoglobin 10.8 and this is also well, MCV elevated at 101 and we may need to have your primary care check on your vitamin B12 levels.  Platelets low at 113.  Hepatitis B surface antigen is reactive.  The viral load is 168.   SOme pts may flare the lfts as they clear the hep b level.  2023 labs  Glucose was 161 elevated and please share with local providers.  BUN of 19 creatinine 1.02 sodium 135 potassium 4.4 chloride 101 CO2 27. Calcium is a little low at 8.1.  Albumin was low also slightly at 2.9.  Bilirubin was elevated at 3.0 but not fractionated.  Alk phos was normal at 92.  AST was up at 73 and ALT of 54.  INR was 1.2. White blood cell count was 4.5 hemoglobin 11.8 platelet count was 114.  MCV was normal at 96.7.  The hepatitis B was 1420 and we really need that to be not detected. Meld 13/Meld na 15 so doing better than prior labs.  Back in November, your labs had shown a calcium of 7.5 which was low a creatinine of 0.86, sugar 110, albumin was lower at 2.1, AST was 114 and ALT 94 and bilirubin 4.9 so these new labs actually do appear to be better versus those. Earlier around the timeframe of 2022 your hep B level was actually still running 186 million so this is much lower now.  Your hemoglobin then was 9.5 also lower.  Most importantly, the hep B DNA has gone from a very high level of 186 million in August down to only 1420 now and so on the way to being undetected again.  Was due for a tips doppler to look at the liver Had to rebook [facility] visit as head the death in the family.   Post tips he has done better.  Re ascites he is controlled and no tap since 2023.  [place] did a tap when colonoscopy Feb 15 2023.  He says meld score when he spoke at 17.  Pt had tips done through [facility] in Oct 2022 and he says that he has needed one tap after the tips and none since.  No pse issues.   He is still on the vemlidy every day.  2022 na 134 and k 4.2 and cl 105 and co2 24 and ca 7.5 and bun 12 and cr 0.86 and glu 110. Tp 5.4 and alb 2.1 and alk 75 and ast 114 and alt 94 and tb 4.9 and wbc 7.5 and hg 10.2 and hct 29.3 and mcv 95.8 and plat 112. Ammonia 84.   Doppler 2022 : EXAM: US ABDOMEN DOPPLER COMPLETE   CLINICAL INDICATION: cirrhosis, S/P TIPS.   TECHNIQUE: Grayscale, pulsed wave and color Doppler sonography of the upper abdomen were performed. ESRC.3.7.1   COMPARISON: 2022   FINDINGS:  Liver: Cirrhotic liver morphology. No lesions.   Bile Ducts: No dilated intrahepatic biliary radicles. Common duct measures 4 mm.   Gallbladder: Poorly visualized, wall appears thickened where visualized. Cameron's sign is negative.   Pancreas: Unremarkable where visualized.   Right Kidney: Measures 10.2 cm. Normal echogenicity. No hydronephrosis.   Left Kidney: Measures 11.0 cm. Normal echogenicity. No hydronephrosis.   Spleen: Measures 16.2 cm.   Aorta: Normal caliber where imaged.   IVC: Normal proximally, not imaged distally.    Hepatic Veins: Patent.   Portal Veins: Hepatopetal flow. 32 cm/s.   TIPS: Patent: Portal venous end 144 cm/s, midportion 180 cm/s, hepatic venous end, 119 cm/s.   Hepatic Arteries: Peak systolic velocity 92 cm/s. Resistive index 0.74.   Other: Ascites.   IMPRESSION:   1. Patent TIPS. 2. Cirrhotic liver. 3. Ascites. 4. Splenomegaly.   2022 tap: Patient Name:  LIZY SOMMERS       : 1970    SEX: Male Ordering Physician:  NAKIA ARAIZA Location:  [facility].IR.1   Indication: Patient with history of cirrhosis and recurrent ascites. Paracentesis requested.   Procedure:  Ultrasound-guided paracentesis   Findings:  1.   Successful removal of 7500 mL of abdominal fluid.   IMPRESSION: Impression:  Successful ultrasound-guided paracentesis.  Oct 31 2022 tips Narrative & Impression Patient Name:  LIZY SOMMERS       : 1970    SEX: Male Ordering Physician:  RILEY JIMENES Location:  [facility].IR.1   Date of exam:  10/31/2022    Clinical Indication:  Refractory ascites   Procedure:    1.  Primary TIPSS 2.  Embolization of gastroesophageal varices. 3.  Paracentesis 4.  Triple lumen central venous catheter placement with ultrasound and fluoroscopic guidance   Findings:    1.  Right internal jugular vein is ultrasonographically patent and compresses.  Needle entry was documented. 2.  Right hepatic venogram confirms vein patency. 3.  Portogram demonstrates patent portal veins with a large left gastric vein varices. 4.  The initial portosystemic gradient is 13 mmHg. 5.  Post-TIPSS portosystemic gradient is 7 mmHg. 6.  The TIPSS has a smooth course and extends from the right hepatic vein to right portal vein. 7.  There is good flow through the shunt at the conclusion of the procedure with minimal appreciable filling of varices. 8.  3.5 liters of fluid were drained during the procedure. 9.  New right internal jugular approach triple-lumen catheter terminates at the superior cavoatrial junction.   IMPRESSION: Impression:    1.  Successful creation of TIPS from right hepatic vein to right portal vein with a 10 mm x 8 cm x 2 cm controlled-expansion Viatorr stent (ballooned to 10 mm).  2.  The portosystemic gradient was reduced from 13 mm Hg to 7 mmHg.  10-4 13 liter tap 8-16 9.5 liter tap  Aug 2022 CT EXAM: CT Abdomen + Pelvis w/o IV Contrast CLINICAL INDICATION: Abdominal pain unspecified. TECHNIQUE: Noncontrast images were obtained through the abdomen and pelvis. ESRC.1.7.2 COMPARISON: MRI abdomen for 3/20/2022 FINDINGS: Lower Thorax: Small left pleural effusion. Moderate hiatal hernia. Liver: Cirrhotic and shrunken appearance of the hepatic parenchyma, and nodular contour with perihepatic ascites. No focal lesions. Gallbladder/Biliary Tree: Normal. Spleen: Redemonstration of splenomegaly. Pancreas: Normal. Adrenal Glands: Normal. Kidneys/Ureters: Normal. Gastrointestinal: No bowel obstruction. Scattered fecal material within loops of large bowel. Bladder: Normal. Prostate/Seminal Vesicles: Normal. Lymph Nodes: No pathologically enlarged lymphadenopathy. Vessels: Lack of IV contrast limits evaluation of vascular structures. Aorta is not aneurysmal. Atherosclerotic calcifications of the splenic vessels. Prominent paraumbilical vein. Peritoneum/Retroperitoneum: Large volume intra-abdominal ascites. Bones/Soft Tissues: Bilateral gynecomastia. Small fluid containing umbilical hernia. Right greater than left small fat-containing inguinal hernias. Mild lower pelvic soft tissue edema. Multilevel degenerative changes with mild retrolisthesis of L5 on S1. No aggressive soft tissue or osseous lesions. IMPRESSION: Cirrhotic hepatic morphology with large volume intra-abdominal ascites, splenomegaly, and portosystemic varices suggestive of underlining portal hypertension. The images were reviewed and interpreted by Paris Dwyer MD. *** Final ***   Electronically Signed By:  Sandra Dwyer on  2022 04:56   Dictated by:  Jitendra Braswell    Recent taps: paracentesis on 2022 with 7.5 L removed. Oct 31 3.5 l Prior he had a 13 L tap done on 2022.  He also had a 9.5 L tap done on .  He had a 16 L tap done on May 27.  He was to do a  colonoscopy scheduled at [place] with  but issues and ride could not bring him. Redoing in .  Prior: [facility] and he says no more lapses.  Aug 16 on you that included a sodium of 133 potassium 3.8 BUN of 25 creatinine 0.73 glucose of 111 albumin 2.7 total bilirubin up to 2.5 AST 79 ALT 74 and alkaline phosphatase 57. August 15 labs showed white blood cell count 4 hemoglobin 9.5 platelet count 101 and MCV 93.4 and INR 1.41.  He last had a paracentesis done  that showed low albumin of less than 1.5 nucleated cell count that was 685 but neutrophils were only 1%.  Aug 2022 ct cirrhotic liver and splenomegaly and varices.  I was concerned hepatitis B is no longer under control and your surface antigen is positive with the E antigen positive and E antibody negative and hep B level of 186 million.    hbv 3280 and in May 236 million. He says he had a lapse for a 2 weeks. Hep delta 2022 negative.  The labs from the discharge summary mention that you are taking folic acid 1 mg a day, Lasix 40 mg twice a day, spironolactone 100 mg a day, glimepiride 4 mg a day, lactulose 30 g p.o. 3 times daily, saxagliptin 5 mg a day, and the Vemlidy 25 mg a day.   saw oltx team. They see him in .  As you recall your last visit was back in  and I believe you are actually out of state at the time and did that visit remotely as you are bringing your parents back down with you to [place] I believe. You are to see us in approximately 4 weeks and you are to follow-up with the transplant team. You no showed for your  appointment. Says that his fluid weight when needs taps is only 220 and down from 270 peal. Getting tapped often. To get a tips procedure also he has some pse baseline.  May 28 [place] labs for me after you advsied her of them being done there. Sodium 133 which is slightly low, potassium 4.0,  chloride 101 CO2 27 BUN of 17 creatinine 0.99 glucose 167 albumin was low at 2.6 bilirubin elevated at 4.0 calcium 7.9 which was low AST was 43 slightly up ALT 29 alk phos 66 white blood cell count 4.9 hemoglobin low at 10.1 MCV elevated 93.1 platelet count low at 137.  labs showed sodium 138 potassium 3.8 creatinine 0.88 bilirubin 3.5 AST 48 ALT 17 alk phos 45 hemoglobin 10.1. Unable to calculate meld score.   May 5 [facility] labs sent in from [place]. Glucose 73 BUN 21 creatinine 0.84 sodium 134 which is slightly low potassium 4.1 chloride 101 CO2 of 28 calcium low at 8.2 albumin low at 3.0 bilirubin elevated 2.7 alkaline phosphatase 70 AST 82 ALT 54 INR 1.3.  White cell count 4.6 hemoglobin 10.2 which is low.  Platelet count 129 which is low.  MCV 95.5.  Neutrophils normal at 2608 and lymphocytes 1003. Prior march labs: na 138 and cr 0.8 and alb 3.8 and tb 3.5 and ast 48 and alt 17 and alk 45 and hg 10.1. Meld 13 and meld na16.  Pt re weight lowest 166 in aug 2022  and now 176 and so hanging in there.  Folks transitioned down to [place].  Still on both diuretics: Lasix 40mg po bid. Aldactone 50 mg po bid.  Prior visit he was to 280 and staying 100 pounds less now.   2022 labs when left na 138 and k 3.8 and cl99 and co2 29 and glu 190 and cr 0.88 and alb 3.8 and tv 3.5 and tb 3.5,ast 48 and alt 17 and alk 45 and wbc 3.8 hg 10.1.   tap less than 1.5 alb  and alb less than 3 and nuc 89 and neutrophils 3%   Document Type:	CT Abdomen + Pelvis w/o IV Contrast Document Date:	2022 04:02 EDT Document Status:	Auth (Verified) Document Title:	CT Abdomen + Pelvis w/o IV Contrast Performed By:	Jitendra Braswell  Verified By:	Sandra Dwyer on 2022 04:56 EDT Encounter info:	[id number], [facility], Inpatient, 2022 - 2022  * Final Report *  Reason For Exam Abdominal pain unspecified  REPORT EXAM: CT Abdomen + Pelvis w/o IV Contrast  CLINICAL INDICATION: Abdominal pain unspecified.  TECHNIQUE: Noncontrast images were obtained through the abdomen and pelvis. ESRC.1.7.2  COMPARISON: MRI abdomen for 3/20/2022  FINDINGS:  Lower Thorax: Small left pleural effusion. Moderate hiatal hernia.  Liver: Cirrhotic and shrunken appearance of the hepatic parenchyma, and nodular contour with perihepatic ascites. No focal lesions.  Gallbladder/Biliary Tree: Normal.  Spleen: Redemonstration of splenomegaly.  Pancreas: Normal.  Adrenal Glands: Normal.  Kidneys/Ureters: Normal.  Gastrointestinal: No bowel obstruction. Scattered fecal material within loops of large bowel.   Bladder: Normal.  Prostate/Seminal Vesicles: Normal.  Lymph Nodes: No pathologically enlarged lymphadenopathy.  Vessels: Lack of IV contrast limits evaluation of vascular structures. Aorta is not aneurysmal. Atherosclerotic calcifications of the splenic vessels. Prominent paraumbilical vein.  Peritoneum/Retroperitoneum: Large volume intra-abdominal ascites.  Bones/Soft Tissues: Bilateral gynecomastia. Small fluid containing umbilical hernia. Right greater than left small fat-containing inguinal hernias. Mild lower pelvic soft tissue edema. Multilevel degenerative changes with mild retrolisthesis of L5 on S1. No aggressive soft tissue or osseous lesions.   IMPRESSION:  Cirrhotic hepatic morphology with large volume intra-abdominal ascites, splenomegaly, and portosystemic varices suggestive of underlining portal hypertension.  The images were reviewed and interpreted by Paris Dwyer MD.  Signature Line *** Final ***  Electronically Signed By:  Sandra Dwyer on  2022 04:56    Document Type:	MRI Abdomen w/ + w/o Contrast Document Date:	2022 19:51 EDT Document Status:	Auth (Verified) Document Title:	MRI Abdomen w/ + w/o Contrast Performed By:	Jose Juan Omalley  Verified By:	Jojo Salcedo on 2022 10:32 EDT Encounter info:	[id number], [facility], Inpatient, 3/23/2022 - 2022  * Final Report *  Reason For Exam Liver disease; hcc screen;Liver disease  REPORT EXAM: MRI Abdomen w/ + w/o Contrast  CLINICAL INDICATION: History of hepatitis B cirrhosis with ascites. And esophageal varices status post endoscopic banding on 3/25/2022. Evaluate for HCC.  TECHNIQUE: Multisequence, multiplanar MRI of the abdomen was performed without and with intravenous contrast. ESRC.2.7.3  CONTRAST: 17cc cc of Prohance  COMPARISON: MRI abdomen 1/3/2022  FINDINGS:  Lower Thorax: Small left pleural effusion and left lower lobe atelectasis similar to prior.  Liver: No fat or iron. Cirrhotic liver morphology similar to prior. No lesions.  Gallbladder/Biliary Tree: Mild diffuse gallbladder wall thickening related to third spacing/underlying liver disease. No stones. No biliary ductal dilatation.  Spleen: Unchanged splenomegaly measuring up to 16.5 cm in craniocaudal length. Small curvilinear T2 hypointensity along the lateral subcapsular region compatible with small remote intracapsular bleed.  Pancreas: Normal.  Adrenal Glands: Normal.   Kidneys/Ureters: Normal.  Gastrointestinal: Normal.   Lymph Nodes: No pathologically enlarged lymph nodes.  Vessels: Unchanged large paraesophageal varices. Additionally there are perigastric, perisplenic, and peritoneal/retroperitoneal varices are similar to prior exam. Recanalized paraumbilical vein. No aortic aneurysm. Patent portal vein, splenic vein, hepatic veins, and SMV.  Peritoneum/Retroperitoneum: Large volume ascites similar to prior. Unchanged smooth diffuse peritoneal enhancement without focal nodularity, similar to prior.  Bones/Soft Tissues: Expected marrow signal. No worrisome osseous lesions.  IMPRESSION:     1.  Cirrhotic liver morphology without evidence of hepatocellular carcinoma. 2.  Sequela of portal hypertension, including large volume ascites, splenomegaly, and upper abdominal varices. 3.  Unchanged diffuse mild peritoneal enhancement which could be from chronic peritoneal irritation from multiple prior paracenteses. However, if there is concern for peritoneal infection, recommend fluid sampling.  The images were reviewed and interpreted by Jojo Salcedo MD.  Signature Line *** Final ***  Electronically Signed By:  Jojo Salcedo on  2022 10:32  Dictated by:  Jose Juan Omalley   22- wbc 5.3 normal, rbc 3.18 low with polychromasia, macrocytes and anisocytosis (please share with primary md), hgb 10.3 low (prev 11.1 and 9.9), platelets 76 low (actual platelet count may be slightly higher than reported due to clumping, prev 148), glucose 137 high but improved (prev 310), creatinine 0.88 normal, sodium 134 normal, calcium low at 8 (please share with primary md), albumin is low at 2.9 (but improved from prev of 2.3), total bili 3.7 high (this is up from prev of 2.1), alk phos 141 high (prev 126), ast 163 high (prev 123), alt 104 high (prev 84), hep b dna 9910 (prev ,270), inr 1.2.  MELD 13, MELD- NA 16.  /- white blood count 5.5 normal, rbc 3.35 low, hemoglobin 11.1 low (prev 11.1), mcv 99 high, platelets 148 low (prev 140), glucose very high at 310 (please follow up with primary md regarding diabetes control), sodium 134 normal,  creatinine 0.99 normal, calcium 8.1 low (please share with primary md), albumin 2.3 low, total bilirubin 2.1 high (improved from 2.8 prev), alk phos 126 high (prev 120), ast 123 high (prev 133), alt 84 high (prev 68), hep b viral load 13,270 (prev 20,890), inr 1.3 (prev 1.1).   MELD 12, MELD-NA 15.   Scheduled for repeat egd 22 with Dr. Estes and he did that in hospital and 2022 grade 3 [id number] and 6 bands and redo in 1m and not done yet as left and do when back.  Referred to cardiology given cardiomegaly and left ventricular dilation seen on CT chest and to see them in april as also cancelled due to insurance.   labs show INR normal at 1.1 and compared to  that is actually lower than 1.2.  Sugar down to 156 from preadmission levels in the 290.  BUN of 16 creatinine 1.11 sodium 135 potassium 4.2 calcium slightly low at 8.4 but up from 7.93 the last admission when it was 7.9.  Albumin still up at 3.3 and previously had been 2.33 that admission.  Total bilirubin still elevated 2.8.  Alkaline phosphatase now normal at 120.   and ALT 68.  He to follow these labs.  Hep B down to 20,890. White blood cell count 5.7 hemoglobin 11.1 which is diminished.  Platelet count 140.  MCV 95.  Neutrophils 3.9 and lymphocytes 1.0.   One looks at your hospital labs as comparison instead of the nov labs: : cr 1.07 and alb 2.7 and tb 3.3 and ast 66 and alt 28.    hg 9.7 and plat 101. Inr 1.57.  HBV [id number] then.   HBV noted and then led to hbv tx and we started the Vemlidy in 2021.  Pt admtited from clinic Dec 28 through  approx and had 3 abd taps (prior had 2 taps pre admit). Varied 5 to 15 liters. IV alb and diuresis and lost 284 to 191.   On lasix 40mg po bid and aldactone 50mg po qd.  HBV level was checked in the hospital.  [id number] eag pos and eab neg. That is coming down from a much higher level at over 890 million.  Document Type:	MRI Abdomen w/ + w/o Contrast Document Date:	2022 18:00 EST Document Status:	Auth (Verified) Document Title:	MRI Abdomen w/ + w/o Contrast Performed By:	Deacon Huang  Verified By:	Deacon Huang on 2022 07:47 EST Encounter info:	[id number], [facility], Inpatient, 2021 - 2022  * Final Report *  Reason For Exam Liver disease  REPORT EXAM: MRI Abdomen w/ + w/o Contrast  CLINICAL INDICATION: Liver disease.  TECHNIQUE: Multisequence, multiplanar MRI of the abdomen was performed without and with intravenous contrast. ESRC.2.7.3  CONTRAST: 20 cc of Prohance  COMPARISON: 12/10/2021.  FINDINGS:  Lower Thorax: Small to moderate-sized left pleural effusion. Nonspecific nodular signal abnormality in the mid right middle and lower lobes, suboptimally evaluated on MRI.  Liver: No fat or iron. Morphologic changes of chronic liver disease including nodular surface contour, lobar redistribution and fissural widening. No definite focal lesion.   Gallbladder/Biliary Tree: Nonspecific mural edema, may be reactive to chronic liver disease and/or third spacing of fluid.  Spleen: Enlarged measuring up to 15.6 cm. Scattered punctate foci of signal dropout on in phase compared to out of phase imaging suggestive of Gamna-Lucia bodies.  Pancreas: Normal.  Adrenal Glands: Normal.   Kidneys/Ureters: Normal.  Gastrointestinal: Circumferential wall thickening with mural edema involving the ascending colon, likely sequelae of portal colopathy.   Lymph Nodes: Normal.  Vessels: Recanalized paraumbilical vein with large paraesophageal, perigastric and perisplenic varices. Patent portal veins.  Peritoneum/Retroperitoneum: Moderate volume abdominopelvic ascites.  Bones/Soft Tissues: Anasarca. No aggressive osseous lesion.  IMPRESSION:     Examination again suboptimal secondary to moderate volume ascites causing dielectric effect artifact.  Morphologic changes of chronic liver disease with sequelae of portal hypertension including moderate volume ascites, splenomegaly and upper abdominal varices. No definite focal hepatic lesion.  Moderate left pleural effusion. Nonspecific signal abnormality in the right middle and lower lobes, suboptimally evaluated on MRI. This can be further evaluated with dedicated CT chest for evaluation of airspace disease as clinically warranted.   Signature Line *** Final ***  Electronically Signed By:  Deacon Huang on  2022 07:47  Dictated by:  Deacon Huang   Chest was also looked at but edema limited for the nodularity and they recommended to redo when more diuresed.   Document Type:	CT Chest w/o Contrast Document Date:	2022 10:22 EST Document Status:	Auth (Verified) Document Title:	CT Chest w/o Contrast Performed By:	Davey Zuniga  Verified By:	Caitlyn Bledsoe on 2022 14:35 EST Encounter info:	[id number], [facility], Inpatient, 2021 - 2022  * Final Report *  Reason For Exam Mediastinal mass  REPORT EXAM: CT Chest w/o Contrast  CLINICAL INDICATION:  Mediastinal mass. MASS  TECHNIQUE: Multiple-row detector helical CT examination of the chest without IV contrast. Axial, sagittal, and coronal reconstructed images.  COMPARISON:  Chest x-ray 2021.    FINDINGS:  Support Devices:  None.    Mediastinum: Cardiomegaly with left ventricular dilation. No pericardial effusion. Slightly low-attenuation of ventricular blood pool which may reflect anemia. Thickened appearance of the mid and distal esophagus.  Lymph nodes: No pathologically enlarged mediastinal, hilar, or axillary lymph nodes.     Airways: Central airways are patent. Mucus plugging within the dependent distal basilar airways.  Lungs/Pleura: Groundglass/consolidative airspace opacities within the bilateral lung bases, left greater than right. Peripheral groundglass opacities within the right middle lobe and along the right minor fissure. Bibasilar atelectasis. No pulmonary edema. Small left pleural effusion. No pneumothorax.    Upper abdomen: Large volume abdominal ascites. Mural gallbladder wall thickening. Splenomegaly. Cirrhotic morphology of the liver. These findings are better characterized on MRI of the abdomen dated 2022. Curvilinear densities face the gastrohepatic ligament likely represent varicosities.  Bones and soft tissues:  Imaged thyroid gland is normal. No acute osseous abnormality multilevel degenerative disc disease with minimal anterior disc osteophytosis. Diffuse anasarca.. Symmetric bilateral gynecomastia    IMPRESSION:   1. Basilar predominant airspace opacities and small left pleural effusion is most consistent with resolving pulmonary edema.  2. The wall of the mid/distal esophagus appears severely thickened. These findings may reflect promt documented sequela of portal hypertension. However, it is difficult to exclude esophageal malignancy and follow-up contrast enhanced CT chest is recommended for further characterization.  3. Peripheral fissural and right middle lobe nodularity. Findings are nonspecific but for further characterization a follow-up CT chest is recommended after resolution of left pleural effusion and when the patient is able to sustain a inspirational breath-hold.  The images were reviewed and interpreted by Caitlyn Bledsoe MD.  He did the egd and banded x 5 and Dr Beauchamp her and need to redo that in 1m. has appt to do with Dr Estes. : Large varices and 5 bands and complete erradication and mild phtn gastropathy.  Needs to be caught up.  Prior visit with [facility]: Dec 28  Pt returns today with complaint of increased abdominal distention, sob, and increased weight.  Currently weighs 284 lbs.  Prior weight 215 (estimate) on 10/18/21, 240 (estimate) on 11/15/21, and  274 21.    MRI 12/10/21 showed large left pleural effusion, has not heard back from pcp in regards to further mgmt of this.  Admits to orthopnea requiring 3-4 pillows.  O2 sat 90% in office.  Presents today in wheelchair due to difficulty with walking secondary to excess weight, swelling in legs, abdomen and SOB.   12/10/21 MRI unfortunately was limited somewhat by your ascites problem. They mention that you had a large layering left pleural effusion with bibasilar atelectasis.  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 They mention he had a shrunken cirrhotic appearing liver there was again limited in its exam but with no obvious lesion but again was limited. The gallbladder and biliary tree showed no discrete abnormality. Spleen was enlarged at 16.5 cm. Pancreas head no gross abnormality.  Neither did the kidneys. Lymph nodes were suboptimally seen but with no gross lymphadenopathy. The portal vein appeared to be grossly patent.  You had a large volume of ascites and some anasarca in your tissues. They recommend given your fluid issues that for the next MRI to do the MRI not too long after after you have a paracentesis done.  That will/could diminish some of the artifact issues that we had this time.   Unfortunately,  not always easy to coordinate these scans as an outpatient with a tap given the limitations of how things are scheduled independently. The hope is that with your hepatitis B being on treatment that the liver labs will cool off as well as some of the factors leading to the speed of this fluid accumulation.  Unfortunately the HBV medicine do take a period of time to drop the HBV levels and inflammation from this particularly if the virus level is higher to drop to start with.  S/p paracentesis with 5L ascites removed on 21, states more fluid remained but told they could only remove 5L max.    Underwent repeat paracentesis on 21 with 13.5 L ascites removed.     IV abx completed end of Oct 2021 for osteomyelitis, cleared by ID but will be on disability for 2 more months per podiatry.     he saw [facility] dietician re the low sodium diet.  Last a1c 6.5% 21.    21 labs- vitamin b12 1099 normal , folate 3.7 normal, glucose 290 (significant elevated, please share with primary MD), creatinine 0.94 normal, monocytes absolute 1.1 high, sodium 131 low, chloride 93 low, calcium 7.9 low, albumin 2.3 (prev 2.8) low, total bili 2.7 high (prev 3.5 on 21), alk phos 148 elevated (prev 100 21), ast 57 elevated (prev 61 21), alt 26 normal (prev 23 21), hgb 9.9 low (prev 12.2 21), rbc 3.0 low, platelets 146 low (prev 163).  Please share these labs with primary MD.  Would like to repeat labs again in 2 weeks prior to next office visit on .  HBV DNA viral load down to 319 million (from prev 890 million prior to starting vemlidy)  10/18/21- TIBC slightly low at 212, iron sat normal at 33, igg 2396 elevated, igm 141, ferritin 403 elevated, hep be ag positive, hep be ab negative, asma negative (prev positive), ama negative, alpha 1 232 elevated but normal phenotype MM, glucose 249 elevated (please share with primary MD), creatinine 1.12 (prev 1.54), potassium low at 3.1 (please share with primary MD as likely secondary to lasix and they may want to adjust potassium supplment dosage or consider adding spironolactone to help with low potassium and fluid retention), albumin low at 3.0 but improved from prior of 2.3, total bili 3.3 elevated, alk phos 81 (prev 69), ast 45 elevated, alt 20, alyssa negative, wbc 6.6, hgb 10.8 low but up from prev 9.0, mcv high at 99, mch 33.8 elevated, rbc 3.2 (please share with primary md), plt 142 low (increased from 92 on 21), inr 1.2, hep b core ab positive, hep a ab total negative (recommend getting hep a vaccine as not immune), hep b surf ag positive.   RECAP Of Pre visit hx: He has a PMHx of DM2 (last a1c 7.5% on glimepiride and janumet), hx of right great toe infection 2021, and presented to the ED on 21 with erythema/pain/drainage from right 4th toe. He was recently diagnosed with OM of right 4th digit on 9/10/21 and is s/p 4th digit partial metatarsal resection by Dr. Mathew. Patient did not follow up with ID outpatient for antibiotics following 9/10 surgery. Patient started on Vanc/Zosyn in ED on   He underwent further surgery by Dr. Mathew on 2021 including right partial 4th metatarsal resection with wound debridement and application of wound VAC, right excisional debridement of the hallux plantar ulcer down to subcutaneous tissues on .   While admitted  he was diagnosed with hepatitis B, ultrasound of the liver was concerning for possible liver cirrhosis and ascites and recommended to follow up outpatient with GI.   Also noted to have ANGELINA that was improving.  Discharged on  with IV Zosyn x6wks at outpatient infusion center (Dr. Behari) with wound vac f/u at Dr Mathew's clinic (podiatry).    Pt notes hx of PARDO/fatty liver disease x 10-15 yrs and treated with diet and exercise and monitoring of labs q6m with pcp. Has not seen hepatology for it.  Was noted to have jaundice during admission with T bili 4.0, direct bili 0.7, indirect bili 3.3. Denies heavy etoh use. No hx of iv drug use. Previously just had labs checked with his pcp every 6 months.  No prior egd. Colon 2-3 yrs ago with no polyps  He has had chronic thrombocytopenia since 2017 (platelets in 130's) and as low as 76 on 21.  Platelets 142 on admission () and 101 ()  Ascites first noted during sept hospitalization.    He believes he contracted HBV from his now ex-wife who notified him that she had been exposed to HBV after marital infidelity.  He  in 2021.  Believes last exposure about 2021.  Labs 2021: WBC 6.7, hemoglobin 9.0, MCV 96.7, platelets low 92, sodium 137, potassium 3.3 low, creatinine 1.54 high, calcium 7.9 low.  Labs 2021: HIV negative  Labs 2021: Hepatitis B viral  million, hepatitis B E antibody nonreactive  Labs 2021: Sodium 137 glucose 162, albumin low at 2.3, T bili 2.3 high, AST 69 high, ALT 31, alk phos 69, platelets 109 low, hemoglobin 9.7 low  Labs 2021: INR 1.39, PT 16.4, ASMA positive 1:40, hep B surface antigen positive, creatinine 1.65, alpha-1 antitrypsin 168, hepatitis A antibody IgM nonreactive, hep B core antibody IgM nonreactive, hep C antibody nonreactive, ceruloplasmin 32, AMA negative, AFP 1.1, vitamin D 11 low  Labs 2021: T bili 3.2, AST 63, ALT 32, alk phos 70, creatinine 1.75, total protein 6.0, albumin 2.3, sodium 136  Labs 2021: T bili 4.0, direct bili 0.7, indirect bili 3.3, A1c 7.5, COVID-19 negative   () GB US: FINDINGS: The pancreas is poorly visualized due to technical factors and overlying bowel gas. Liver and gallbladder are not well assessed due to poor acoustic windows. Increased liver echogenicity is suggested. Gallbladder is nonvisualized. There is a negative sonographic Cameron sign. The common bile duct measures 4 mm . The right kidney measures 12.7 cm in greatest longitudinal dimension. No right-sided hydronephrosis. There is a small to moderate amount of ascites demonstrated within the upper abdomen. Liver demonstrates a slightly nodular contour.   IMPRESSION: Summation limited by poor acoustic windows. The gallbladder was not visualized. Liver was not well assessed. Slightly nodular contour the liver suggests which may reflect underlying cirrhosis. Correlate with clinical findings. Diffuse mild increased liver echogenicity which can be seen with diffuse hepatic steatosis versus diffuse infiltration. Correlate with hepatic enzymes. Ascites.   Plan: 1. Needs to get his labs done now and send in to us, 2, Pt will contact [facility] to rebook the oltx eval. 3. Good to see no more need for taps. 4. Pt will stay on meds, 5. He is back in town 2 weeks. 6. 3m follow up.   Duration of the visit was 31   minutes with 10 minutes of chart prep reviewing  labs and [facility] notes and tests and scans and labs since last visit and loading to ecw  and then 21 min via [facility] video for the TeleMed visit reviewing recent records current status and future plans for the patient.

## 2023-10-01 ENCOUNTER — ERX REFILL RESPONSE (OUTPATIENT)
Dept: URBAN - METROPOLITAN AREA CLINIC 86 | Facility: CLINIC | Age: 53
End: 2023-10-01

## 2023-10-01 RX ORDER — SPIRONOLACTONE 50 MG/1
TAKE 1 TABLET TWICE A DAY TABLET ORAL
Qty: 180 TABLET | Refills: 0 | OUTPATIENT

## 2023-10-01 RX ORDER — FUROSEMIDE 40 MG/1
TAKE 1 TABLET BY MOUTH TWICE A DAY TABLET ORAL
Qty: 180 | Refills: 0 | OUTPATIENT

## 2023-10-01 RX ORDER — FOLIC ACID 1 MG/1
TAKE 1 TABLET DAILY TABLET ORAL
Qty: 90 TABLET | Refills: 0 | OUTPATIENT

## 2023-10-01 RX ORDER — FOLIC ACID 1 MG/1
TAKE 1 TABLET DAILY TABLET ORAL
Qty: 90 TABLET | Refills: 4 | OUTPATIENT

## 2023-12-25 ENCOUNTER — LAB OUTSIDE AN ENCOUNTER (OUTPATIENT)
Dept: URBAN - METROPOLITAN AREA TELEHEALTH 2 | Facility: TELEHEALTH | Age: 53
End: 2023-12-25

## 2024-01-01 ENCOUNTER — TELEPHONE ENCOUNTER (OUTPATIENT)
Dept: URBAN - METROPOLITAN AREA CLINIC 86 | Facility: CLINIC | Age: 54
End: 2024-01-01

## 2024-01-01 NOTE — HPI-TODAY'S VISIT:
Patient records from [facility] reviewed and patient was in the hospital from October 18 through October 28 for TIPS occlusion issue. Patient was noted to have a 2.2 L paracentesis done in October 20 with a TIPS occlusion noted and revision with final gradient of 6 mmHg noted.  Portal thrombectomy done as well as variceal embolization. CT scan on October 19, 2023 showed occlusive thrombus at the TIPS portal venous stent extending into the expanded proximal portal vein.  Interval increase in umbilical hernia without evidence of incarceration seen containing omentum with a neck measuring 4.5 cm and a sac measuring 7.2 x 4.4 cm as well as underlying cirrhosis with portal hypertension in the form of varices, splenomegaly and moderate ascites. 2/19 ultrasound showed thrombosed main portal vein as seen on CT and low velocity flow in the region of the portal vein likely due to collaterals.  No significant flow in the left portal vein.  Trace flow in the TIPS likely from small collaterals. October 24 ultrasound with normal velocities in the portal of the TIPS borderline low velocity in the midportion of the TIPS and decrease velocity hepatic portion of the TIPS that could reflect some residual thrombus in the mid hepatic portion. Patient of note was admitted to the hospital for suspicion of worsening ascites and edema and scrotal edema and found to have the TIPS occlusion.  He had the paracentesis done and then the TIPS revision variceal embolization.  He was monitored in the ICU.  There was some concern for residual thrombus so he was recommended to be on anticoagulation and placed on Eliquis without issues.  Sodium continue to fluctuate.  He was discharged on Lasix 80 mg twice a day for 3 days and then return to Lasix 40 mg twice a day and to check with primary provider in 1 week and nephrology in 1 to 2 weeks. He was kept on the spironolactone. Patient will be maintained on Eliquis 5 mg twice daily and a 7 follow-up imaging in 4 weeks postdischarge.  He was to follow-up outpatient with Dr. Hamilton regarding this. Acute kidney injury noted during the stay baseline 0.8-1 went up to 1.4 post TIPS revision and then improved to 1.1 by October 22.  Leukocytosis increased from 10-21 post TIPS revision but had received some steroids.  He was maintained on his ability for the hep B.  Discharge meds included Eliquis 5 mg twice daily, Lasix 40 mg twice daily, folic acid 1 mg a day, glimepiride 4 mg a day, lactulose 15 mL twice a day, Onglyza 5 mg at night, Aldactone 50 mg a day, and availability 25 mg a day. No new labs done since discharge. October 28 labs showed sodium 126 potassium 4.6 calcium 7.6 BUN of 20 creatinine 1.15 glucose 255 October 26 CBC showed WBC 5.7 hemoglobin 7.4 coming up from a low 7.0.  Platelet count 87.  Iron sat of 33%.  B12 901 and folate greater than 20.  INR 1.92. October 25 labs showed albumin 2.6 bilirubin 3.4 direct 1.12 indirect 2.28 AST 26 and ALT 12 alk phos 59. Patient has not followed up with [facility] since then.  Did see an appointment for January 10 that was canceled. Did see a note for his eval authorization was extended and that as of January 1 thousand 23 [facility] was out of network with [facility]. Per notes November 27 he was referred to [facility]?

## 2024-01-04 ENCOUNTER — OFFICE VISIT (OUTPATIENT)
Dept: URBAN - METROPOLITAN AREA TELEHEALTH 2 | Facility: TELEHEALTH | Age: 54
End: 2024-01-04

## 2024-01-04 ENCOUNTER — TELEPHONE ENCOUNTER (OUTPATIENT)
Dept: URBAN - METROPOLITAN AREA CLINIC 86 | Facility: CLINIC | Age: 54
End: 2024-01-04

## 2024-01-04 RX ORDER — FUROSEMIDE 40 MG/1
TAKE 1 TABLET BY MOUTH TWICE A DAY TABLET ORAL
Qty: 180 | Refills: 0

## 2024-01-04 RX ORDER — LACTULOSE 10 G/15ML
30 ML SOLUTION ORAL
Qty: 5400 ML | Refills: 0 | Status: ACTIVE | COMMUNITY

## 2024-01-04 RX ORDER — TENOFOVIR ALAFENAMIDE 25 MG/1
TAKE 1 TABLET BY MOUTH ONCE DAILY WITH FOOD TABLET ORAL
Qty: 30 TABLET | Refills: 0 | Status: ACTIVE | COMMUNITY

## 2024-01-04 RX ORDER — SPIRONOLACTONE 50 MG/1
TAKE 1 TABLET TWICE A DAY TABLET ORAL
Qty: 180 TABLET | Refills: 0

## 2024-01-04 RX ORDER — FOLIC ACID 1 MG/1
TAKE 1 TABLET DAILY TABLET ORAL
Qty: 90 TABLET | Refills: 0 | Status: ACTIVE | COMMUNITY

## 2024-01-04 RX ORDER — GLIMEPIRIDE 4 MG/1
1 TABLET WITH BREAKFAST OR THE FIRST MAIN MEAL OF THE DAY TABLET ORAL ONCE A DAY
Status: ACTIVE | COMMUNITY

## 2024-01-04 RX ORDER — FUROSEMIDE 40 MG/1
TAKE 1 TABLET BY MOUTH TWICE A DAY TABLET ORAL
Qty: 180 | Refills: 0 | Status: ACTIVE | COMMUNITY

## 2024-01-04 RX ORDER — TENOFOVIR ALAFENAMIDE 25 MG/1
TAKE 1 TABLET BY MOUTH ONCE DAILY WITH FOOD TABLET ORAL
Qty: 30 TABLET | Refills: 2

## 2024-01-04 RX ORDER — SAXAGLIPTIN 5 MG/1
1 TABLET TABLET, FILM COATED ORAL ONCE A DAY
Status: ACTIVE | COMMUNITY

## 2024-01-04 RX ORDER — TENOFOVIR ALAFENAMIDE 25 MG/1
TAKE 1 TABLET BY MOUTH ONCE DAILY WITH FOOD TABLET ORAL
Qty: 30 TABLET | Refills: 2 | Status: ACTIVE | COMMUNITY

## 2024-01-04 RX ORDER — SPIRONOLACTONE 50 MG/1
TAKE 1 TABLET TWICE A DAY TABLET ORAL
Qty: 180 TABLET | Refills: 0 | Status: ACTIVE | COMMUNITY

## 2024-01-04 NOTE — HPI-TODAY'S VISIT:
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 A copy of this note will be sent to the referringprovider.  Patient [facility] from [facility] reviewed and patient wasin the hospital from  through  for TIPS occlusionissue.Patient was noted to have a 2.2 L paracentesis done in  with aTIPS occlusion noted and revision with final gradient of 6 mmHg noted. Portalthrombectomy done as well as variceal embolization.CT scan on 2023 showed occlusivethrombus at the TIPS portal venous stent extending into the expanded proximalportal vein. Interval increase in umbilical hernia without evidence ofincarceration seen containing omentum with a neck measuring 4.5 cm and a sacmeasuring 7.2 x 4.4 cm as well as underlying cirrhosis with portal hypertensionin the form of varices, splenomegaly and moderate ascites. ultrasound showed thrombosed main portal veinas seen on CT and low velocity flow in the region of the portal vein likely dueto collaterals. No significant flow in the left portal vein. Trace flow in theTIPS likely from small collaterals.  ultrasound with normal velocities inthe portal of the TIPS borderline low velocity in the midportion of the TIPSand decrease velocity hepatic portion of the TIPS that could reflect someresidual thrombus in the mid hepatic portion.[facility] ascites and edema and scrotal edema and found to havethe TIPS occlusion. He had the paracentesis done and then the TIPS revisionvariceal embolization. He was monitored in the ICU. There was some concern forresidual thrombus so he was recommended to be on anticoagulation and placed onEliquis without issues. Sodium continue to fluctuate. He was discharged onLasix 80 mg twice a day for 3 days and then return to Lasix 40 mg twice a dayand to check with primary provider in 1 week and nephrology in 1 to 2 weeks.He was kept on the spironolactone.Patient will be maintained on Eliquis 5 mg twicedaily and a 7 follow-up imaging in 4 weeks postdischarge. He was to follow-upoutpatient with Dr. Hamilton regarding this. Acute kidney injury noted during the stay baseline0.8-1 went up to 1.4 post TIPS revision and then improved to 1.1 by .Leukocytosis increased from 10-21 post TIPS revision but had received somesteroids. He was maintained on his ability for the hep B. Discharge meds included Eliquis 5 mg twice daily,Lasix 40 mg twice daily, folic acid 1 mg a day, glimepiride 4 mg a day,lactulose 15 mL twice a day, Onglyza 5 mg at night, Aldactone 50 mg a day, andavailability 25 mg a day. No new labs done since discharge. labs showed sodium 126 potassium 4.6calcium 7.6 BUN of 20 creatinine 1.15 glucose 255Oct CBC showed WBC 5.7 hemoglobin 7.4coming up from a low 7.0. Platelet count 87. Iron sat of 33%. B12 901 andfolate greater than 20. INR 1.92.  labs showed albumin 2.6 bilirubin 3.4direct 1.12 indirect 2.28 AST 26 and ALT 12 alk phos 59.Patient has not followed up with [facility] since then.Did see an appointment for January 10 that was canceled.Did see a note for his eval authorization wasextended and that as of  thousand 23 [facility] was out of network withlife source. Per notes  he was referred to [facility]?  Pt says his aunt  in [place] 84 yrs. Pt says he has kept weight at 180. He is still seeing the [facility] team for the oltxteam.  He needs to do labs for u.s.  labs were given to me. The glucose 270,calcium low at 7.9, creatinine 0.87, sodium 136, potassium 4.5, albumin 2.7 andthis is low, bilirubin 4.0 and this is elevated, alkaline phosphatase 92, AST55, ALT 55, INR 1.1, white blood cells 3.03 and this is low, hemoglobin 10.8and this is also well, MCV elevated at 101 and we may need to have your primarycare check on your vitamin B12 levels. Platelets low at 113. Hepatitis Bsurface antigen is reactive. The viral load is 168. SOme pts may flare the lfts as they clear the hepb level.  2023 labs Glucose was 161 elevated and please share withlocal providers. BUN of 19 creatinine 1.02 sodium 135 potassium 4.4 [id number] CO2 27. Calcium is a little low at 8.1. Albumin was lowalso slightly at 2.9. Bilirubin was elevated at 3.0 but not fractionated. Alkphos was normal at 92. AST was up at 73 and ALT of 54. INR was 1.2.White blood cell count was 4.5 hemoglobin 11.8platelet count was 114. MCV was normal at 96.7. The hepatitis B was 1420 and wereally need that to be not detected. Meld 13/Meld na 15 so doing better than prior labs. Back in November, your labs had shown a calcium of7.5 which was low a creatinine of 0.86, sugar 110, albumin was lower at 2.1,AST was 114 and ALT 94 and bilirubin 4.9 so these new labs actually do appearto be better versus those. Earlier around the timeframe of 2022 yourhep B level was actually still running 186 million so this is much lower now.Your hemoglobin then was 9.5 also lower.  Most importantly, the hep B DNA has gone from iban high level of 186 million in August down to only 1420 now and so on theway to being undetected again.  Was due for a tips doppler to look at the liverHad to rebook [facility] visit as head the death in the family.  Post tips he has done better. Re ascites he is controlled and no tap since .  [facility] did a tap when colonoscopy Feb 15 2023. He says meld score when he spoke at 17. Pt had tips done through [facility] in Oct 2022 and hesays that he has needed one tap after the tips and none since. No pse issues.  He is still on the vemlidy every day. 2022 na 134 and k 4.2 and cl 105 and co2 24and ca 7.5 and bun 12 and cr 0.86 and glu 110.Tp 5.4 and alb 2.1 and alk 75 and ast 114 and alt94 and tb 4.9 and wbc 7.5 and hg 10.2 and hct 29.3 and mcv 95.8 and plat 112.Ammonia 84.   Doppler 2022 :EXAM: US ABDOMEN DOPPLER COMPLETE CLINICAL INDICATION: cirrhosis, S/P TIPS. TECHNIQUE: Grayscale, pulsed wave and colorDoppler sonography of the upper abdomen were performed. ESRC.3.7.1 COMPARISON: 2022 FINDINGS: Liver: Cirrhotic liver morphology. No lesions. Bile Ducts: No dilated intrahepatic biliaryradicles. Common duct measures 4 mm.  Gallbladder: Poorly visualized, wall appearsthickened where visualized. Cameron's sign is negative. Pancreas: Unremarkable where visualized. Right Kidney: Measures 10.2 cm. Normalechogenicity. No hydronephrosis.  Left Kidney: Measures 11.0 cm. Normalechogenicity. No hydronephrosis.  Spleen: Measures 16.2 cm. Aorta: Normal caliber where imaged. IVC: Normal proximally, not imaged distally. Hepatic Veins: Patent. Portal Veins: Hepatopetal flow. 32 cm/s. TIPS: Patent: Portal venous end 144 cm/s,midportion 180 cm/s, hepatic venous end, 119 cm/s. Hepatic Arteries: Peak systolic velocity 92 cm/s.Resistive index 0.74.  Other: Ascites.  IMPRESSION:  1. Patent TIPS. 2. Cirrhotic liver.3. Ascites. 4. Splenomegaly. 2022 tap: Patient Name: JOSE J SOMMERSOB: 1970 SEX: MaleOrdering Physician: NAKIA ARAIZALocation: [facility] [email or web address]  Indication: Patient with history of cirrhosis and recurrentascites. Paracentesis requested.  Procedure: Ultrasound-guided paracentesis Findings: 1. Successful removal of 7500 mL of abdominalfluid.  IMPRESSION: Impression: Successful ultrasound-guided paracentesis. Oct 31 2022 tipsNarrative & ImpressionPatient Name: JOSE J SOMMERSOB: 1970 SEX: MaleOrdering Physician: LEEANNE JIMENESocation: [email or web address].[email or web address]  Date of exam: 10/31/2022 Clinical Indication: Refractory ascites Procedure:  1. Primary TIPSS2. Embolization of gastroesophageal varices.3. Paracentesis 4. Triple lumen central venous catheter placementwith ultrasound and fluoroscopic guidance  Findings:  1. Right internal jugular vein isultrasonographically patent and compresses. Needle entry was documented.2. Right hepatic venogram confirms vein patency.3. Portogram demonstrates patent portal veins witha large left gastric vein varices. 4. The initial portosystemic gradient is 13 mmHg.5. Post-TIPSS portosystemic gradient is 7 mmHg.6. The TIPSS has a smooth course and extends fromthe right hepatic vein to right portal vein. 7. There is good flow through the shunt at theconclusion of the procedure with minimal appreciable filling of varices.8. 3.5 liters of fluid were drained during theprocedure. 9. New right internal jugular approachtriple-lumen catheter terminates at the superior cavoatrial junction. IMPRESSION: Impression:  1. Successful creation of TIPS from right hepaticvein to right portal vein with a 10 mm x 8 cm x 2 cm controlled-expansionViatorr stent (ballooned to 10 mm). 2. The portosystemic gradient was reduced from 13mm Hg to 7 mmHg.  10-4 13 liter tap8-16 9.5 liter tap Aug 2022 CT EXAM: CT Abdomen + Pelvis w/o IV ContrastCLINICAL INDICATION: Abdominal pain unspecified.TECHNIQUE: Noncontrast images were obtainedthrough the abdomen and pelvis. ESRC.1.7.2 COMPARISON: MRI abdomen for 3/20/2022FINDINGS: Lower Thorax: Small left pleural effusion.Moderate hiatal hernia. Liver: Cirrhotic and shrunken appearance of thehepatic parenchyma, and nodular contour with perihepatic ascites. No focallesions. Gallbladder/Biliary Tree: Normal.Spleen: Redemonstration of splenomegaly.Pancreas: Normal.Adrenal Glands: Normal.Kidneys/Ureters: Normal.Gastrointestinal: No bowel obstruction. Scatteredfecal material within loops of large bowel. Bladder: Normal.Prostate/Seminal Vesicles: Normal.Lymph Nodes: No pathologically enlargedlymphadenopathy. Vessels: Lack of IV contrast limits evaluation ofvascular structures. Aorta is not aneurysmal. Atherosclerotic calcifications ofthe splenic vessels. Prominent paraumbilical vein.Peritoneum/Retroperitoneum: Large volumeintra-abdominal ascites. Bones/Soft Tissues: Bilateral gynecomastia. Smallfluid containing umbilical hernia. Right greater than left small fat-containinginguinal hernias. Mild lower pelvic soft tissue edema. Multilevel degenerativechanges with mild retrolisthesis of L5 on S1. No aggressive soft tissue orosseous lesions. IMPRESSION: Cirrhotic hepatic morphology with large volumeintra-abdominal ascites, splenomegaly, and portosystemic varices suggestive ofunderlining portal hypertension. The images were reviewed and interpreted byParis Dwyer MD. *** Final ***  Electronically Signed By: Sandra Dwyer 2022 04:56 Dictated by: Jitendra Braswell   Recent taps: paracentesis on 2022 with7.5 L removed. Oct 31 3.5 l Prior he had a 13 L tap done on 2022.He also had a 9.5 L tap done on . He had a 16 L tap done on May 27. He was to do a  colonoscopy scheduledat [facility] with  but issues and ride could not bring him. Redoing inJan.  Prior: He ran out of the vemlidy and he says nomore lapses.  Aug 16 on you that included a sodium of 133potassium 3.8 BUN of 25 creatinine 0.73 glucose of 111 albumin 2.7 totalbilirubin up to 2.5 AST 79 ALT 74 and alkaline phosphatase 57.August 15 labs showed white blood cell count 4hemoglobin 9.5 platelet count 101 and MCV 93.4 and INR 1.41. He last had a paracentesis done  thatshowed low albumin of less than 1.5 nucleated cell count that was 685 butneutrophils were only 1%.  Aug 2022 ct cirrhotic liver and splenomegaly andvarices.  I was concerned hepatitis B is no longer undercontrol and your surface antigen is positive with the E antigen positive and Eantibody negative and hep B level of 186 million.  hbv 3280 and in May 236 million. He sayshe had a lapse for a 2 weeks. Hep delta 2022 negative. The labs from the discharge summary mention thatyou are taking folic acid 1 mg a day, Lasix 40 mg twice a day, ipaibzcvosrcdl490 mg a day, glimepiride 4 mg a day, lactulose 30 g p.o. 3 times daily,saxagliptin 5 mg a day, and the Vemlidy 25 mg a day.  saw oltx team. They see him in . As you recall your last visit was back in and I believe you are actually out of state at the time and did that visitremotely as you are bringing your parents back down with you to [place]. You are to see us in approximately 4 weeks and youare to follow-up with the transplant team. You no showed for your  appointment.Says that his fluid weight when needs taps is amid272 and down from 270 peal. Getting tapped often. To get a tips procedure alsohe has some pse baseline.  May 28 [facility] labs for me after you advsied her ofthem being done there. Sodium 133 which is slightly low, potassium 4.0,chloride 101 CO2 27 BUN of 17 creatinine 0.99 glucose 167 albumin was low at2.6 bilirubin elevated at 4.0 calcium 7.9 which was low AST was 43 slightly upALT 29 alk phos 66 white blood cell count 4.9 hemoglobin low at 10.1 MCVelevated 93.1 platelet count low at 137.  labs showed sodium 138 potassium 3.8creatinine 0.88 bilirubin 3.5 AST 48 ALT 17 alk phos 45 hemoglobin 10.1.Unable to calculate meld score.  May 5 [facility] labs sent in from [place].Glucose 73 BUN 21 creatinine 0.84 sodium 134 whichis slightly low potassium 4.1 chloride 101 CO2 of 28 calcium low at 8.2 albuminlow at 3.0 bilirubin elevated 2.7 alkaline phosphatase 70 AST 82 ALT 54 INR1.3. White cell count 4.6 hemoglobin 10.2 which is low. Platelet count 129which is low. MCV 95.5. Neutrophils normal at 2608 and lymphocytes 1003.Prior march labs: na 138 and cr 0.8 and alb 3.8and tb 3.5 and ast 48 and alt 17 and alk 45 and hg 10.1.Meld 13 and meld na16. Pt re weight lowest 166 in aug 2022 and now 176and so hanging in there.  Folks transitioned down to [place]. Still on both diuretics:Lasix 40mg po bid.Aldactone 50 mg po bid. Prior visit he was to 280 and staying 100 poundsless now.   2022 labs when left na 138 and k 3.8 andcl99 and co2 29 and glu 190 and cr 0.88 and alb 3.8 and tv 3.5 and tb 3.5,ast48 and alt 17 and alk 45 and wbc 3.8 hg 10.1.  tap less than 1.5 alb and alb less than 3and nuc 89 and neutrophils 3%   Document Type: CT Abdomen + Pelvis w/o IV ContrastDocument Date: 2022 04:02 EDTDocument Status: Auth (Verified)Document Title: CT Abdomen + Pelvis w/o IV ContrastPerformed By: Jitendra Braswell WVerified By: Sandra Dwyer on  04:56 EDT Encounter info: [id number], formerly [facility], Inpatient,2022 - 2022  * Final Report * Reason For Exam Abdominal pain unspecified REPORT EXAM: CT Abdomen + Pelvis w/o IV Contrast CLINICAL INDICATION: Abdominal pain unspecified. TECHNIQUE: Noncontrast images were obtainedthrough the abdomen and pelvis. ESRC.1.7.2  COMPARISON: MRI abdomen for 3/20/2022 FINDINGS: Lower Thorax: Small left pleural effusion.Moderate hiatal hernia.  Liver: Cirrhotic and shrunken appearance of thehepatic parenchyma, and nodular contour with perihepatic ascites. No focallesions.  Gallbladder/Biliary Tree: Normal. Spleen: Redemonstration of splenomegaly. Pancreas: Normal. Adrenal Glands: Normal. Kidneys/Ureters: Normal. Gastrointestinal: No bowel obstruction. Scatteredfecal material within loops of large bowel.  Bladder: Normal. Prostate/Seminal Vesicles: Normal. Lymph Nodes: No pathologically enlargedlymphadenopathy.  Vessels: Lack of IV contrast limits evaluation ofvascular structures. Aorta is not aneurysmal. Atherosclerotic calcifications ofthe splenic vessels. Prominent paraumbilical vein. Peritoneum/Retroperitoneum: Large volumeintra-abdominal ascites.  Bones/Soft Tissues: Bilateral gynecomastia. Smallfluid containing umbilical hernia. Right greater than left small fat-containinginguinal hernias. Mild lower pelvic soft tissue edema. Multilevel degenerativechanges with mild retrolisthesis of L5 on S1. No aggressive softtissue or osseous lesions.  IMPRESSION: Cirrhotic hepatic morphology with large volumeintra-abdominal ascites, splenomegaly, and portosystemic varices suggestive ofunderlining portal hypertension.  The images were reviewed and interpreted byParis Dwyer MD.  Signature Line *** Final ***  Electronically Signed By: Sandra Dwyer 2022 04:56   Document Type: MRI Abdomen w/ + w/o ContrastDocument Date: 2022 19:51 EDTDocument Status: Auth (Verified)Document Title: MRI Abdomen w/ + w/o ContrastPerformed By: Jose Juan Omalley JVerified By: Jojo Salcedo on 2022 10:32EDT Encounter info: [id number], [facility], Inpatient,3/23/2022 - 2022  * Final Report * Reason For Exam Liver disease; hcc screen;Liver disease REPORT EXAM: MRI Abdomen w/ + w/o Contrast CLINICAL INDICATION: History of hepatitis Bcirrhosis with ascites. And esophageal varices status post endoscopic bandingon 3/25/2022. Evaluate for HCC.  TECHNIQUE: Multisequence, multiplanar MRI of theabdomen was performed without and with intravenous contrast. ESRC.2.7.3 CONTRAST: 17cc cc of Prohance COMPARISON: MRI abdomen 1/3/2022 FINDINGS: Lower Thorax: Small left pleural effusion and leftlower lobe atelectasis similar to prior.  Liver: No fat or iron. Cirrhotic liver morphologysimilar to prior. No lesions.  Gallbladder/Biliary Tree: Mild diffuse gallbladderwall thickening related to third spacing/underlying liver disease. No stones.No biliary ductal dilatation.  Spleen: Unchanged splenomegaly measuring up to16.5 cm in craniocaudal length. Small curvilinear T2 hypointensity along thelateral subcapsular region compatible with small remote intracapsular bleed. Pancreas: Normal. Adrenal Glands: Normal. Kidneys/Ureters: Normal. Gastrointestinal: Normal. Lymph Nodes: No pathologically enlarged lymphnodes.  Vessels: Unchanged large paraesophageal varices.Additionally there are perigastric, perisplenic, and peritoneal/retroperitonealvarices are similar to prior exam. Recanalized paraumbilical vein. No aorticaneurysm. Patent portal vein, splenic vein, hepatic veins, and SMV. Peritoneum/Retroperitoneum: Large volume ascitessimilar to prior. Unchanged smooth diffuse peritoneal enhancement without focalnodularity, similar to prior.  Bones/Soft Tissues: Expected marrow signal. Noworrisome osseous lesions.  IMPRESSION: 1. Cirrhotic liver morphology without evidence ofhepatocellular carcinoma. 2. Sequela of portal hypertension, including largevolume ascites, splenomegaly, and upper abdominal varices.3. Unchanged diffuse mild peritoneal enhancementwhich could be from chronic peritoneal irritation from multiple priorparacenteses. However, if there is concern for peritoneal infection, recommendfluid sampling.  The images were reviewed and interpreted by MD Keith.  Signature Line *** Final ***  Electronically Signed By: Vi Salcedo 2022 10:32 Dictated by: Jose Juan Omalley  22- wbc 5.3 normal, rbc 3.18 low withpolychromasia, macrocytes and anisocytosis (please share with primary md), hgb10.3 low (prev 11.1 and 9.9), platelets 76 low (actual platelet count may beslightly higher than reported due to clumping, prev 148), glucose 137 high butimproved (prev 310), creatinine 0.88 normal, sodium 134 normal, calcium low at8 (please share with primary md), albumin is low at 2.9 (but improved from prevof 2.3), total bili 3.7 high (this is up from prev of 2.1), alk phos 141 high(prev 126), ast 163 high (prev 123), alt 104 high (prev 84), hep b dna 9910(prev 13,270), inr 1.2. MELD 13, MELD- NA 16. 22- white blood count 5.5 normal, rbc 3.35low, hemoglobin 11.1 low (prev 11.1), mcv 99 high, platelets 148 low (coim835), glucose very high at 310 (please follow up with primary md regardingdiabetes control), sodium 134 normal, creatinine 0.99 normal, calcium 8.1 low(please share with primary md), albumin 2.3 low, total bilirubin 2.1 high(improved from 2.8 prev), alk phos 126 high (prev 120), ast 123 high (ihtr158), alt 84 high (prev 68), hep b viral load 13,270 (prev 20,890), inr 1.3(prev 1.1). MELD 12, MELD-NA 15.   Scheduled for repeat egd 22 with Dr. Franklyn sabillon did that in hospital and 2022 grade 3 [id number] and 6 bands and redo in1m and not done yet as left and do when back. Referred to cardiology given cardiomegaly and leftventricular dilation seen on CT chest and to see them in april as alsocancelled due to insurance.   labs show INR normal at 1.1 andcompared to  that is actually lower than 1.2. Sugar down to 156 frompreadmission levels in the 290. BUN of 16 creatinine 1.11 sodium 135 potassium4.2 calcium slightly low at 8.4 but up from 7.93 the last admission when it was7.9. Albumin still up at 3.3 and previously had been 2.33 that admission. Totalbilirubin still elevated 2.8. Alkaline phosphatase now normal at 120. AST 133and ALT 68. He to follow these labs. Hep B down to 20,890. White blood cellcount 5.7 hemoglobin 11.1 which is diminished. Platelet count 140. MCV 95.Neutrophils 3.9 and lymphocytes 1.0. One looks at your hospital labs as comparisoninstead of the nov labs: : cr 1.07 and alb 2.7 and tb 3.3 and ast 66 andalt 28.   hg 9.7 and plat 101. Inr 1.57.HBV 332342 then. HBV noted and then led to hbv tx and we startedthe Vemlidy in 2021.  Pt admtited from clinic Dec 28 through  7approx and had 3 abd taps (prior had 2 taps pre admit). Varied 5 to 15 liters.IV alb and diuresis and lost 284 to 191.  On lasix 40mg po bid and aldactone 50mg po qd. HBV level was checked in the hospital. [id number] eagpos and eab neg. That is coming down from a much higher level at over 890million.  Document Type: MRI Abdomen w/ + w/o ContrastDocument Date: 2022 18:00 ESTDocument Status: Auth (Verified)Document Title: MRI Abdomen w/ + w/o ContrastPerformed By: Sami HuangerVerified By: Deacon Huang on 207:47 EST Encounter info: [id number], [facility], Inpatient,2021 - 2022  * Final Report * Reason For Exam Liver disease  REPORT EXAM: MRI Abdomen w/ + w/o Contrast CLINICAL INDICATION: Liver disease. TECHNIQUE: Multisequence, multiplanar MRI of theabdomen was performed without and with intravenous contrast. ESRC.2.7.3 CONTRAST: 20 cc of Prohance COMPARISON: 12/10/2021. FINDINGS: Lower Thorax: Small to moderate-sized left pleuraleffusion. Nonspecific nodular signal abnormality in the mid right middle andlower lobes, suboptimally evaluated on MRI.  Liver: No fat or iron. Morphologic changes ofchronic liver disease including nodular surface contour, lobar redistributionand fissural widening. No definite focal lesion. Gallbladder/Biliary Tree: Nonspecific mural edema,may be reactive to chronic liver disease and/or third spacing of fluid. Spleen: Enlarged measuring up to 15.6 cm.Scattered punctate foci of signal dropout on in phase compared to out of phaseimaging suggestive of Gamna-Lucia bodies.  Pancreas: Normal. Adrenal Glands: Normal. Kidneys/Ureters: Normal. Gastrointestinal: Circumferential wall thickeningwith mural edema involving the ascending colon, likely sequelae of portalcolopathy.  Lymph Nodes: Normal. Vessels: Recanalized paraumbilical vein with largeparaesophageal, perigastric and perisplenic varices. Patent portal veins. Peritoneum/Retroperitoneum: Moderate volumeabdominopelvic ascites.  Bones/Soft Tissues: Anasarca. No aggressiveosseous lesion.  IMPRESSION: Examination again suboptimal secondary to moderatevolume ascites causing dielectric effect artifact. Morphologic changes of chronic liver disease withsequelae of portal hypertension including moderate volume ascites, splenomegalyand upper abdominal varices. No definite focal hepatic lesion. Moderate left pleural effusion. Nonspecific signalabnormality in the right middle and lower lobes, suboptimally evaluated on MRI.This can be further evaluated with dedicated CT chest for evaluation ofairspace disease as clinically warranted.   Signature Line *** Final ***  Electronically Signed By: Romy Huang 2022 07:47 Dictated by: Deacon Huang  Chest was also looked at but edema limited for thenodularity and they recommended to redo when more diuresed.  Document Type: CT Chest w/o ContrastDocument Date: 2022 10:22 ESTDocument Status: Auth (Verified)Document Title: CT Chest w/o ContrastPerformed By: Davey Zuniga JVerified By: Caitlyn Bledsoe on 2022 14:35EST Encounter info: [id number], [facility], Inpatient,2021 - 2022  * Final Report * Reason For Exam Mediastinal mass REPORT EXAM: CT Chest w/o Contrast CLINICAL INDICATION: Mediastinal mass. MASS TECHNIQUE: Multiple-row detector helical CTexamination of the chest without IV contrast. Axial, sagittal, and coronalreconstructed images.  COMPARISON: Chest x-ray 2021. FINDINGS: Support Devices: None. Mediastinum: Cardiomegaly with left ventriculardilation. No pericardial effusion. Slightly low-attenuation of ventricularblood pool which may reflect anemia. Thickened appearance of the mid and distalesophagus.  Lymph nodes: No pathologically enlargedmediastinal, hilar, or axillary lymph nodes.  Airways: Central airways are patent. Mucusplugging within the dependent distal basilar airways. Lungs/Pleura: Groundglass/consolidative airspaceopacities within the bilateral lung bases, left greater than right. Peripheralgroundglass opacities within the right middle lobe and along the right minorfissure. Bibasilar atelectasis. No pulmonary edema. Small left pleuraleffusion. No pneumothorax.  Upper abdomen: Large volume abdominal ascites.Mural gallbladder wall thickening. Splenomegaly. Cirrhotic morphology of theliver. These findings are better characterized on MRI of the abdomen date. Curvilinear densities face the gastrohepatic ligament likelyrepresent varicosities.  Bones and soft tissues: Imaged thyroid gland isnormal. No acute osseous abnormality multilevel degenerative disc disease withminimal anterior disc osteophytosis. Diffuse anasarca.. Symmetric bilateralgynecomastia  IMPRESSION:  1. Basilar predominant airspace opacities andsmall left pleural effusion is most consistent with resolving pulmonary edema. 2. The wall of the mid/distal esophagus appearsseverely thickened. These findings may reflect promt documented sequela ofportal hypertension. However, it is difficult to exclude esophageal malignancyand follow-up contrast enhanced CT chest is recommended for furthercharacterization.  3. Peripheral fissural and right middle lobenodularity. Findings are nonspecific but for further characterization afollow-up CT chest is recommended after resolution of left pleural effusion andwhen the patient is able to sustain a inspirational breath-hold. The images were reviewed and interpreted by MD Chris.  He did the egd and banded x 5 and Dr Nito march need to redo that in 1m. has appt to do with Dr Estes.: Large varices and 5 bands and completeerradication and mild phtn gastropathy.  Needs to be caught up. Prior visit with Scarlett: Dec 28Pt returns today with complaint of increasedabdominal distention, sob, and increased weight. Currently weighs 284 lbs.Prior weight 215 (estimate) on 10/18/21, 240 (estimate) on 11/15/21, and [id number].  MRI 12/10/21 showed large left pleural effusion,has not heard back from pcp in regards to further mgmt of this. Admits toorthopnea requiring 3-4 pillows. O2 sat 90% in office. Presents today inwheelchair due to difficulty with walking secondary to excess weight, swellingin legs, abdomen and SOB. 12/10/21 MRI unfortunately was limited somewhat byyour ascites problem. They mention that you had a large layering leftpleural effusion with bibasilar atelectasis. They suspected largeparaesophageal varices but not as optimally evaluated again for the limitationswere mentioned above. They mention he had a shrunken cirrhotic appearingliver there was again limited in its exam but with no obvious lesion but againwas limited. The gallbladder and biliary tree showed nodiscrete abnormality. Spleen was enlarged at 16.5 cm.Pancreas head no gross abnormality. Neither didthe kidneys. Lymph nodes were suboptimally seen but with nogross lymphadenopathy. The portal vein appeared to be grossly patent. Youhad a large volume of ascites and some anasarca in your tissues.They recommend given your fluid issues that forthe next MRI to do the MRI not too long after after you have a paracentesisdone. That will/could diminish some of the artifact issues that we had thistime. Unfortunately, not always easy to coordinate thesescans as an outpatient with a tap given the limitations of how things arescheduled independently. The hope is that with your hepatitis B being ontreatment that the liver labs will cool off as well as some of the factorsleading to the speed of this fluid accumulation. Unfortunately the HBV medicinedo take a period of time to drop the HBV levels and inflammation from thisparticularly if the virus level is higher to drop to start with. S/p paracentesis with 5L ascites removed on21, states more fluid remained but told they could only remove 5L max. Underwent repeat paracentesis on 21 with 13.5L ascites removed.  IV abx completed end of Oct 2021 forosteomyelitis, cleared by ID but will be on disability for 2 more months perpodiatry.  he saw belae dietician re the low sodium diet. Last a1c 6.5% 21. 21 labs- vitamin b12 1099 normal , folate3.7 normal, glucose 290 (significant elevated, please share with primary MD),creatinine 0.94 normal, monocytes absolute 1.1 high, sodium 131 low, kossnsbw60 low, calcium 7.9 low, albumin 2.3 (prev 2.8) low, total bili 2.7 high (prev3.5 on 21), alk phos 148 elevated (prev 100 21), ast 57 elevated(prev 61 21), alt 26 normal (prev 23 21), hgb 9.9 low (prev 12.), rbc 3.0 low, platelets 146 low (prev 163). Please share these labs withdiana PANTOJA. Would like to repeat labs again in 2 weeks prior to next officevisit on . HBV DNA viral load down to 319 million (from prev 890 millionprior to starting vemlidy)  10/18/21- TIBC slightly low at 212, iron satnormal at 33, igg 2396 elevated, igm 141, ferritin 403 elevated, hep be agpositive, hep be ab negative, asma negative (prev positive), ama negative,alpha 1 232 elevated but normal phenotype MM, glucose 249 elevated (pleaseshare with primary MD), creatinine 1.12 (prev 1.54), potassium low at 3.1(please share with primary MD as likely secondary to lasix and they may want toadjust potassium supplment dosage or consider adding spironolactone to helpwith low potassium and fluid retention), albumin low at 3.0 but improved fromprior of 2.3, total bili 3.3 elevated, alk phos 81 (prev 69), ast 45 elevated,alt 20, alyssa negative, wbc 6.6, hgb 10.8 low but up from prev 9.0, mcv high at99, mch 33.8 elevated, rbc 3.2 (please share with primary md), plt 142 low(increased from 92 on 21), inr 1.2, hep b core ab positive, hep a ab totalnegative (recommend getting hep a vaccine as not immune), hep b surf agpositive.  RECAP Of Pre visit hx:He has a PMHx of DM2 (last a1c 7.5% on glimepirideand janumet), hx of right great toe infection 2021, and presented to the [facility] with erythema/pain/drainage from right 4th toe. He was recentlydiagnosed with OM of right 4th digit on 9/10/21 and is s/p 4th digit partialmetatarsal resection by Dr. Mathew. Patient did not follow up with IDoutpatient for antibiotics following 9/10 surgery. Patient started onVanc/Zosyn in ED on  He underwent further surgery by Dr. Mathew on2021 including right partial 4th metatarsal resection with wounddebridement and application of wound VAC, right excisional debridement of thehallux plantar ulcer down to subcutaneous tissues on . While admitted hewas diagnosed with hepatitis B, ultrasound of the liver was concerning forpossible liver cirrhosis and ascites and recommended to follow up outpatientwith GI. Also noted to have ANGELINA that was improving. Discharged on  with IVZosyn x6wks at outpatient infusion center (Dr. Behari) with wound vac f/[facility] (podiatry).  Pt notes hx of PARDO/fatty liver disease x 10-15yrs and treated with diet and exercise and monitoring of labs q6m with pcp. Hasnot seen hepatology for it. Was noted to have jaundice during admission with Tbili 4.0, direct bili 0.7, indirect bili 3.3. Denies heavy etoh use. No hx ofiv drug use. Previously just had labs checked with his pcp every 6 months. Noprior egd. Colon 2-3 yrs ago with no polyps  He has had chronic thrombocytopenia since 2017(platelets in 130's) and as low as 76 on 21. Platelets 142 on admission() and 101 ()  Ascites first noted during sept hospitalization. He believes he contracted HBV from his now ex-wifewho notified him that she had been exposed to HBV after marital infidelity. Hedivorced in 2021. Believes last exposure about 2021. Labs 2021: WBC 6.7, hemoglobin 9.0, MCV 96.7,platelets low 92, sodium 137, potassium 3.3 low, creatinine 1.54 high, calcium7.9 low.  Labs 2021: HIV negative Labs 2021: Hepatitis B viral  million,hepatitis B E antibody nonreactive  Labs 2021: Sodium 137 glucose 162, albuminlow at 2.3, T bili 2.3 high, AST 69 high, ALT 31, alk phos 69, platelets 109low, hemoglobin 9.7 low  Labs 2021: INR 1.39, PT 16.4, ASMA positive1:40, hep B surface antigen positive, creatinine 1.65, alpha-1 antitrypsin 168,hepatitis A antibody IgM nonreactive, hep B core antibody IgM nonreactive, hepC antibody nonreactive, ceruloplasmin 32, AMA negative, AFP 1.1, vitamin D 11low  Labs 2021: T bili 3.2, AST 63, ALT 32, alkphos 70, creatinine 1.75, total protein 6.0, albumin 2.3, sodium 136 Labs 2021: T bili 4.0, direct bili 0.7,indirect bili 3.3, A1c 7.5, COVID-19 negative  () GB US: FINDINGS: The pancreas is poorly visualized due to technicalfactors and overlying bowel gas. Liver and gallbladder are not well assessed due topoor acoustic windows. Increased liver echogenicity is suggested. Gallbladderis nonvisualized. There is a negative sonographic Cameron sign.The common bile duct measures 4 mm .The right kidney measures 12.7 cm in greatestlongitudinal dimension. No right-sided hydronephrosis.There is a small to moderate amount of ascitesdemonstrated within the upper abdomen. Liver demonstrates a slightly nodularcontour.  IMPRESSION: Summation limited by poor acoustic windows. Thegallbladder was not visualized. Liver was not well assessed.Slightly nodular contour the liver suggests whichmay reflect underlying cirrhosis. Correlate with clinical findings.Diffuse mild increased liver echogenicity whichcan be seen with diffuse hepatic steatosis versus diffuse infiltration.Correlate with hepatic enzymes. Ascites.  Plan: 1. Needs to get his labs done now and send in tous, 2, Pt will contact [facility] to rebook the oltx eval.3. Good to see no more need for taps.4. Pt will stay on meds,5. He is back in town 2 weeks.6. 3m follow up.  Duration of the visit was  minutes with 10minutes of chart prep reviewing labs and deion notes and tests and scans andlabs since last visit and loading to ecw and then 21 min via [employer] video forthe TeleMed visit reviewing recent records current status and future plans forthe patient.

## 2024-01-25 ENCOUNTER — P2P PATIENT RECORD (OUTPATIENT)
Age: 54
End: 2024-01-25